# Patient Record
Sex: FEMALE | Race: WHITE | NOT HISPANIC OR LATINO | Employment: OTHER | URBAN - METROPOLITAN AREA
[De-identification: names, ages, dates, MRNs, and addresses within clinical notes are randomized per-mention and may not be internally consistent; named-entity substitution may affect disease eponyms.]

---

## 2017-11-29 ENCOUNTER — TRANSFERRED RECORDS (OUTPATIENT)
Dept: HEALTH INFORMATION MANAGEMENT | Facility: CLINIC | Age: 77
End: 2017-11-29

## 2017-12-21 ENCOUNTER — TRANSFERRED RECORDS (OUTPATIENT)
Dept: HEALTH INFORMATION MANAGEMENT | Facility: CLINIC | Age: 77
End: 2017-12-21

## 2018-01-02 RX ORDER — LOSARTAN POTASSIUM 100 MG/1
100 TABLET ORAL EVERY MORNING
COMMUNITY
End: 2024-07-22

## 2018-01-02 RX ORDER — AMLODIPINE BESYLATE 5 MG/1
5 TABLET ORAL AT BEDTIME
COMMUNITY
End: 2024-07-22

## 2018-01-02 RX ORDER — LEVOTHYROXINE SODIUM 100 UG/1
100 TABLET ORAL
COMMUNITY

## 2018-01-03 ENCOUNTER — HOSPITAL ENCOUNTER (INPATIENT)
Facility: CLINIC | Age: 78
LOS: 1 days | Discharge: HOME | DRG: 742 | End: 2018-01-04
Attending: OBSTETRICS & GYNECOLOGY | Admitting: COLON & RECTAL SURGERY
Payer: MEDICARE

## 2018-01-03 ENCOUNTER — ANESTHESIA EVENT (OUTPATIENT)
Dept: SURGERY | Facility: CLINIC | Age: 78
DRG: 742 | End: 2018-01-03
Payer: MEDICARE

## 2018-01-03 ENCOUNTER — ANESTHESIA (OUTPATIENT)
Dept: SURGERY | Facility: CLINIC | Age: 78
DRG: 742 | End: 2018-01-03
Payer: MEDICARE

## 2018-01-03 DIAGNOSIS — N81.9 FEMALE GENITAL PROLAPSE, UNSPECIFIED TYPE: Primary | ICD-10-CM

## 2018-01-03 PROBLEM — N81.10 PELVIC ORGAN PROLAPSE QUANTIFICATION STAGE 4 CYSTOCELE: Status: ACTIVE | Noted: 2018-01-03

## 2018-01-03 LAB
ANION GAP SERPL CALCULATED.3IONS-SCNC: 6 MMOL/L (ref 3–14)
BUN SERPL-MCNC: 8 MG/DL (ref 7–30)
CALCIUM SERPL-MCNC: 9.1 MG/DL (ref 8.5–10.1)
CHLORIDE SERPL-SCNC: 104 MMOL/L (ref 94–109)
CO2 SERPL-SCNC: 26 MMOL/L (ref 20–32)
CREAT SERPL-MCNC: 0.63 MG/DL (ref 0.52–1.04)
ERYTHROCYTE [DISTWIDTH] IN BLOOD BY AUTOMATED COUNT: 12 % (ref 10–15)
GFR SERPL CREATININE-BSD FRML MDRD: >90 ML/MIN/1.7M2
GLUCOSE SERPL-MCNC: 113 MG/DL (ref 70–99)
HCT VFR BLD AUTO: 40 % (ref 35–47)
HGB BLD-MCNC: 13.6 G/DL (ref 11.7–15.7)
MCH RBC QN AUTO: 29.6 PG (ref 26.5–33)
MCHC RBC AUTO-ENTMCNC: 34 G/DL (ref 31.5–36.5)
MCV RBC AUTO: 87 FL (ref 78–100)
PLATELET # BLD AUTO: 201 10E9/L (ref 150–450)
POTASSIUM SERPL-SCNC: 3.6 MMOL/L (ref 3.4–5.3)
RBC # BLD AUTO: 4.59 10E12/L (ref 3.8–5.2)
SODIUM SERPL-SCNC: 136 MMOL/L (ref 133–144)
WBC # BLD AUTO: 6.9 10E9/L (ref 4–11)

## 2018-01-03 PROCEDURE — 25000125 ZZHC RX 250: Performed by: ANESTHESIOLOGY

## 2018-01-03 PROCEDURE — 40000169 ZZH STATISTIC PRE-PROCEDURE ASSESSMENT I: Performed by: OBSTETRICS & GYNECOLOGY

## 2018-01-03 PROCEDURE — 0TVD8ZZ RESTRICTION OF URETHRA, VIA NATURAL OR ARTIFICIAL OPENING ENDOSCOPIC: ICD-10-PCS | Performed by: OBSTETRICS & GYNECOLOGY

## 2018-01-03 PROCEDURE — 25000128 H RX IP 250 OP 636: Performed by: ANESTHESIOLOGY

## 2018-01-03 PROCEDURE — 8E0W4CZ ROBOTIC ASSISTED PROCEDURE OF TRUNK REGION, PERCUTANEOUS ENDOSCOPIC APPROACH: ICD-10-PCS | Performed by: COLON & RECTAL SURGERY

## 2018-01-03 PROCEDURE — 71000012 ZZH RECOVERY PHASE 1 LEVEL 1 FIRST HR: Performed by: OBSTETRICS & GYNECOLOGY

## 2018-01-03 PROCEDURE — 25000128 H RX IP 250 OP 636: Performed by: PHYSICIAN ASSISTANT

## 2018-01-03 PROCEDURE — 0UT9FZZ RESECTION OF UTERUS, VIA NATURAL OR ARTIFICIAL OPENING WITH PERCUTANEOUS ENDOSCOPIC ASSISTANCE: ICD-10-PCS | Performed by: OBSTETRICS & GYNECOLOGY

## 2018-01-03 PROCEDURE — 85027 COMPLETE CBC AUTOMATED: CPT | Performed by: COLON & RECTAL SURGERY

## 2018-01-03 PROCEDURE — 0TSD4ZZ REPOSITION URETHRA, PERCUTANEOUS ENDOSCOPIC APPROACH: ICD-10-PCS | Performed by: OBSTETRICS & GYNECOLOGY

## 2018-01-03 PROCEDURE — 36000087 ZZH SURGERY LEVEL 8 EA 15 ADDTL MIN: Performed by: OBSTETRICS & GYNECOLOGY

## 2018-01-03 PROCEDURE — 37000009 ZZH ANESTHESIA TECHNICAL FEE, EACH ADDTL 15 MIN: Performed by: OBSTETRICS & GYNECOLOGY

## 2018-01-03 PROCEDURE — 25800025 ZZH RX 258: Performed by: OBSTETRICS & GYNECOLOGY

## 2018-01-03 PROCEDURE — 0TUB8JZ SUPPLEMENT BLADDER WITH SYNTHETIC SUBSTITUTE, VIA NATURAL OR ARTIFICIAL OPENING ENDOSCOPIC: ICD-10-PCS | Performed by: OBSTETRICS & GYNECOLOGY

## 2018-01-03 PROCEDURE — 25000125 ZZHC RX 250: Performed by: NURSE ANESTHETIST, CERTIFIED REGISTERED

## 2018-01-03 PROCEDURE — 85018 HEMOGLOBIN: CPT | Performed by: OBSTETRICS & GYNECOLOGY

## 2018-01-03 PROCEDURE — 80048 BASIC METABOLIC PNL TOTAL CA: CPT | Performed by: COLON & RECTAL SURGERY

## 2018-01-03 PROCEDURE — 27210794 ZZH OR GENERAL SUPPLY STERILE: Performed by: OBSTETRICS & GYNECOLOGY

## 2018-01-03 PROCEDURE — 0DNU4ZZ RELEASE OMENTUM, PERCUTANEOUS ENDOSCOPIC APPROACH: ICD-10-PCS | Performed by: COLON & RECTAL SURGERY

## 2018-01-03 PROCEDURE — 25000132 ZZH RX MED GY IP 250 OP 250 PS 637: Mod: GY | Performed by: COLON & RECTAL SURGERY

## 2018-01-03 PROCEDURE — 71000013 ZZH RECOVERY PHASE 1 LEVEL 1 EA ADDTL HR: Performed by: OBSTETRICS & GYNECOLOGY

## 2018-01-03 PROCEDURE — 0USG4ZZ REPOSITION VAGINA, PERCUTANEOUS ENDOSCOPIC APPROACH: ICD-10-PCS | Performed by: OBSTETRICS & GYNECOLOGY

## 2018-01-03 PROCEDURE — 25000128 H RX IP 250 OP 636: Performed by: NURSE ANESTHETIST, CERTIFIED REGISTERED

## 2018-01-03 PROCEDURE — C1771 REP DEV, URINARY, W/SLING: HCPCS | Performed by: OBSTETRICS & GYNECOLOGY

## 2018-01-03 PROCEDURE — 25000128 H RX IP 250 OP 636: Performed by: COLON & RECTAL SURGERY

## 2018-01-03 PROCEDURE — 36000085 ZZH SURGERY LEVEL 8 1ST 30 MIN: Performed by: OBSTETRICS & GYNECOLOGY

## 2018-01-03 PROCEDURE — 25000128 H RX IP 250 OP 636

## 2018-01-03 PROCEDURE — 25000125 ZZHC RX 250

## 2018-01-03 PROCEDURE — 25000566 ZZH SEVOFLURANE, EA 15 MIN: Performed by: OBSTETRICS & GYNECOLOGY

## 2018-01-03 PROCEDURE — 88307 TISSUE EXAM BY PATHOLOGIST: CPT | Performed by: OBSTETRICS & GYNECOLOGY

## 2018-01-03 PROCEDURE — 25800025 ZZH RX 258: Performed by: PHYSICIAN ASSISTANT

## 2018-01-03 PROCEDURE — 27210995 ZZH RX 272: Performed by: PHYSICIAN ASSISTANT

## 2018-01-03 PROCEDURE — C1781 MESH (IMPLANTABLE): HCPCS | Performed by: OBSTETRICS & GYNECOLOGY

## 2018-01-03 PROCEDURE — 37000008 ZZH ANESTHESIA TECHNICAL FEE, 1ST 30 MIN: Performed by: OBSTETRICS & GYNECOLOGY

## 2018-01-03 PROCEDURE — 36415 COLL VENOUS BLD VENIPUNCTURE: CPT | Performed by: COLON & RECTAL SURGERY

## 2018-01-03 PROCEDURE — 0DNW4ZZ RELEASE PERITONEUM, PERCUTANEOUS ENDOSCOPIC APPROACH: ICD-10-PCS | Performed by: COLON & RECTAL SURGERY

## 2018-01-03 PROCEDURE — 88307 TISSUE EXAM BY PATHOLOGIST: CPT | Mod: 26 | Performed by: OBSTETRICS & GYNECOLOGY

## 2018-01-03 PROCEDURE — 0UT2FZZ RESECTION OF BILATERAL OVARIES, VIA NATURAL OR ARTIFICIAL OPENING WITH PERCUTANEOUS ENDOSCOPIC ASSISTANCE: ICD-10-PCS | Performed by: OBSTETRICS & GYNECOLOGY

## 2018-01-03 PROCEDURE — 0UT7FZZ RESECTION OF BILATERAL FALLOPIAN TUBES, VIA NATURAL OR ARTIFICIAL OPENING WITH PERCUTANEOUS ENDOSCOPIC ASSISTANCE: ICD-10-PCS | Performed by: OBSTETRICS & GYNECOLOGY

## 2018-01-03 PROCEDURE — 0UUG4JZ SUPPLEMENT VAGINA WITH SYNTHETIC SUBSTITUTE, PERCUTANEOUS ENDOSCOPIC APPROACH: ICD-10-PCS | Performed by: OBSTETRICS & GYNECOLOGY

## 2018-01-03 PROCEDURE — 25000125 ZZHC RX 250: Performed by: OBSTETRICS & GYNECOLOGY

## 2018-01-03 PROCEDURE — 0JNC0ZZ RELEASE PELVIC REGION SUBCUTANEOUS TISSUE AND FASCIA, OPEN APPROACH: ICD-10-PCS | Performed by: COLON & RECTAL SURGERY

## 2018-01-03 PROCEDURE — A9270 NON-COVERED ITEM OR SERVICE: HCPCS | Mod: GY | Performed by: COLON & RECTAL SURGERY

## 2018-01-03 PROCEDURE — 0JUC3JZ SUPPLEMENT OF PELVIC REGION SUBCUTANEOUS TISSUE AND FASCIA WITH SYNTHETIC SUBSTITUTE, PERCUTANEOUS APPROACH: ICD-10-PCS | Performed by: COLON & RECTAL SURGERY

## 2018-01-03 PROCEDURE — 27210995 ZZH RX 272: Performed by: OBSTETRICS & GYNECOLOGY

## 2018-01-03 PROCEDURE — 0DSP4ZZ REPOSITION RECTUM, PERCUTANEOUS ENDOSCOPIC APPROACH: ICD-10-PCS | Performed by: COLON & RECTAL SURGERY

## 2018-01-03 DEVICE — SLING URINARY TVT EXACT  TVTRL: Type: IMPLANTABLE DEVICE | Site: VAGINA | Status: FUNCTIONAL

## 2018-01-03 DEVICE — MESH SLING FLAT RESTORELLE 24X8CM 501440: Type: IMPLANTABLE DEVICE | Site: PELVIS | Status: FUNCTIONAL

## 2018-01-03 DEVICE — MESH SLING ARTISYN SACROCOLPOPEXY ARTY5L: Type: IMPLANTABLE DEVICE | Site: PELVIS | Status: FUNCTIONAL

## 2018-01-03 RX ORDER — HYDROMORPHONE HYDROCHLORIDE 1 MG/ML
.3-.5 INJECTION, SOLUTION INTRAMUSCULAR; INTRAVENOUS; SUBCUTANEOUS
Status: DISCONTINUED | OUTPATIENT
Start: 2018-01-03 | End: 2018-01-04 | Stop reason: HOSPADM

## 2018-01-03 RX ORDER — ONDANSETRON 4 MG/1
4 TABLET, ORALLY DISINTEGRATING ORAL EVERY 6 HOURS PRN
Status: DISCONTINUED | OUTPATIENT
Start: 2018-01-03 | End: 2018-01-04 | Stop reason: HOSPADM

## 2018-01-03 RX ORDER — KETOROLAC TROMETHAMINE 15 MG/ML
15 INJECTION, SOLUTION INTRAMUSCULAR; INTRAVENOUS EVERY 6 HOURS PRN
Status: DISCONTINUED | OUTPATIENT
Start: 2018-01-03 | End: 2018-01-04 | Stop reason: HOSPADM

## 2018-01-03 RX ORDER — FENTANYL CITRATE 50 UG/ML
INJECTION, SOLUTION INTRAMUSCULAR; INTRAVENOUS PRN
Status: DISCONTINUED | OUTPATIENT
Start: 2018-01-03 | End: 2018-01-03

## 2018-01-03 RX ORDER — FENTANYL CITRATE 50 UG/ML
25-50 INJECTION, SOLUTION INTRAMUSCULAR; INTRAVENOUS
Status: DISCONTINUED | OUTPATIENT
Start: 2018-01-03 | End: 2018-01-03 | Stop reason: HOSPADM

## 2018-01-03 RX ORDER — PROCHLORPERAZINE MALEATE 5 MG
5 TABLET ORAL EVERY 6 HOURS PRN
Status: DISCONTINUED | OUTPATIENT
Start: 2018-01-03 | End: 2018-01-04 | Stop reason: HOSPADM

## 2018-01-03 RX ORDER — SODIUM CHLORIDE, SODIUM LACTATE, POTASSIUM CHLORIDE, CALCIUM CHLORIDE 600; 310; 30; 20 MG/100ML; MG/100ML; MG/100ML; MG/100ML
INJECTION, SOLUTION INTRAVENOUS CONTINUOUS
Status: DISCONTINUED | OUTPATIENT
Start: 2018-01-03 | End: 2018-01-03 | Stop reason: HOSPADM

## 2018-01-03 RX ORDER — ONDANSETRON 2 MG/ML
INJECTION INTRAMUSCULAR; INTRAVENOUS PRN
Status: DISCONTINUED | OUTPATIENT
Start: 2018-01-03 | End: 2018-01-03

## 2018-01-03 RX ORDER — OXYCODONE AND ACETAMINOPHEN 5; 325 MG/1; MG/1
1-2 TABLET ORAL EVERY 4 HOURS PRN
Qty: 30 TABLET | Refills: 0 | Status: SHIPPED | OUTPATIENT
Start: 2018-01-03 | End: 2018-03-02

## 2018-01-03 RX ORDER — BUPIVACAINE HYDROCHLORIDE AND EPINEPHRINE 5; 5 MG/ML; UG/ML
INJECTION, SOLUTION PERINEURAL PRN
Status: DISCONTINUED | OUTPATIENT
Start: 2018-01-03 | End: 2018-01-03 | Stop reason: HOSPADM

## 2018-01-03 RX ORDER — ACETAMINOPHEN 10 MG/ML
1000 INJECTION, SOLUTION INTRAVENOUS EVERY 6 HOURS
Status: DISCONTINUED | OUTPATIENT
Start: 2018-01-03 | End: 2018-01-04 | Stop reason: HOSPADM

## 2018-01-03 RX ORDER — ACETAMINOPHEN 325 MG/1
975 TABLET ORAL ONCE
Status: COMPLETED | OUTPATIENT
Start: 2018-01-03 | End: 2018-01-03

## 2018-01-03 RX ORDER — ONDANSETRON 4 MG/1
4 TABLET, ORALLY DISINTEGRATING ORAL EVERY 30 MIN PRN
Status: DISCONTINUED | OUTPATIENT
Start: 2018-01-03 | End: 2018-01-03 | Stop reason: HOSPADM

## 2018-01-03 RX ORDER — CEFAZOLIN SODIUM 1 G
1 VIAL (EA) INJECTION EVERY 8 HOURS
Status: COMPLETED | OUTPATIENT
Start: 2018-01-03 | End: 2018-01-04

## 2018-01-03 RX ORDER — HYDROMORPHONE HYDROCHLORIDE 1 MG/ML
.3-.5 INJECTION, SOLUTION INTRAMUSCULAR; INTRAVENOUS; SUBCUTANEOUS EVERY 5 MIN PRN
Status: DISCONTINUED | OUTPATIENT
Start: 2018-01-03 | End: 2018-01-03 | Stop reason: HOSPADM

## 2018-01-03 RX ORDER — EPHEDRINE SULFATE 50 MG/ML
INJECTION, SOLUTION INTRAMUSCULAR; INTRAVENOUS; SUBCUTANEOUS PRN
Status: DISCONTINUED | OUTPATIENT
Start: 2018-01-03 | End: 2018-01-03

## 2018-01-03 RX ORDER — NALOXONE HYDROCHLORIDE 0.4 MG/ML
.1-.4 INJECTION, SOLUTION INTRAMUSCULAR; INTRAVENOUS; SUBCUTANEOUS
Status: DISCONTINUED | OUTPATIENT
Start: 2018-01-03 | End: 2018-01-04 | Stop reason: HOSPADM

## 2018-01-03 RX ORDER — PROPOFOL 10 MG/ML
INJECTION, EMULSION INTRAVENOUS CONTINUOUS PRN
Status: DISCONTINUED | OUTPATIENT
Start: 2018-01-03 | End: 2018-01-03

## 2018-01-03 RX ORDER — ONDANSETRON 2 MG/ML
4 INJECTION INTRAMUSCULAR; INTRAVENOUS EVERY 6 HOURS PRN
Status: DISCONTINUED | OUTPATIENT
Start: 2018-01-03 | End: 2018-01-04 | Stop reason: HOSPADM

## 2018-01-03 RX ORDER — ONDANSETRON 2 MG/ML
4 INJECTION INTRAMUSCULAR; INTRAVENOUS EVERY 30 MIN PRN
Status: DISCONTINUED | OUTPATIENT
Start: 2018-01-03 | End: 2018-01-03 | Stop reason: HOSPADM

## 2018-01-03 RX ORDER — GLYCOPYRROLATE 0.2 MG/ML
INJECTION, SOLUTION INTRAMUSCULAR; INTRAVENOUS PRN
Status: DISCONTINUED | OUTPATIENT
Start: 2018-01-03 | End: 2018-01-03

## 2018-01-03 RX ORDER — DEXAMETHASONE SODIUM PHOSPHATE 4 MG/ML
INJECTION, SOLUTION INTRA-ARTICULAR; INTRALESIONAL; INTRAMUSCULAR; INTRAVENOUS; SOFT TISSUE PRN
Status: DISCONTINUED | OUTPATIENT
Start: 2018-01-03 | End: 2018-01-03

## 2018-01-03 RX ORDER — LEVOTHYROXINE SODIUM 88 UG/1
88 TABLET ORAL EVERY MORNING
Status: DISCONTINUED | OUTPATIENT
Start: 2018-01-04 | End: 2018-01-04 | Stop reason: HOSPADM

## 2018-01-03 RX ORDER — LIDOCAINE HYDROCHLORIDE 20 MG/ML
INJECTION, SOLUTION INFILTRATION; PERINEURAL PRN
Status: DISCONTINUED | OUTPATIENT
Start: 2018-01-03 | End: 2018-01-03

## 2018-01-03 RX ORDER — PROPOFOL 10 MG/ML
INJECTION, EMULSION INTRAVENOUS PRN
Status: DISCONTINUED | OUTPATIENT
Start: 2018-01-03 | End: 2018-01-03

## 2018-01-03 RX ORDER — DEXTROSE MONOHYDRATE, SODIUM CHLORIDE, AND POTASSIUM CHLORIDE 50; 1.49; 4.5 G/1000ML; G/1000ML; G/1000ML
INJECTION, SOLUTION INTRAVENOUS CONTINUOUS
Status: DISCONTINUED | OUTPATIENT
Start: 2018-01-03 | End: 2018-01-04

## 2018-01-03 RX ORDER — MAGNESIUM HYDROXIDE 1200 MG/15ML
LIQUID ORAL PRN
Status: DISCONTINUED | OUTPATIENT
Start: 2018-01-03 | End: 2018-01-03 | Stop reason: HOSPADM

## 2018-01-03 RX ORDER — NEOSTIGMINE METHYLSULFATE 1 MG/ML
VIAL (ML) INJECTION PRN
Status: DISCONTINUED | OUTPATIENT
Start: 2018-01-03 | End: 2018-01-03

## 2018-01-03 RX ADMIN — ROCURONIUM BROMIDE 50 MG: 10 INJECTION INTRAVENOUS at 07:41

## 2018-01-03 RX ADMIN — TAZOBACTAM SODIUM AND PIPERACILLIN SODIUM 2.25 G: 375; 3 INJECTION, SOLUTION INTRAVENOUS at 10:15

## 2018-01-03 RX ADMIN — HYDROMORPHONE HYDROCHLORIDE 0.25 MG: 1 INJECTION, SOLUTION INTRAMUSCULAR; INTRAVENOUS; SUBCUTANEOUS at 11:30

## 2018-01-03 RX ADMIN — PROCHLORPERAZINE EDISYLATE 5 MG: 5 INJECTION INTRAMUSCULAR; INTRAVENOUS at 18:32

## 2018-01-03 RX ADMIN — LIDOCAINE HYDROCHLORIDE 80 MG: 20 INJECTION, SOLUTION INFILTRATION; PERINEURAL at 07:41

## 2018-01-03 RX ADMIN — FENTANYL CITRATE 50 MCG: 50 INJECTION, SOLUTION INTRAMUSCULAR; INTRAVENOUS at 08:41

## 2018-01-03 RX ADMIN — ONDANSETRON 4 MG: 2 INJECTION INTRAMUSCULAR; INTRAVENOUS at 18:01

## 2018-01-03 RX ADMIN — ONDANSETRON 4 MG: 2 INJECTION INTRAMUSCULAR; INTRAVENOUS at 11:42

## 2018-01-03 RX ADMIN — KETOROLAC TROMETHAMINE 15 MG: 15 INJECTION, SOLUTION INTRAMUSCULAR; INTRAVENOUS at 14:26

## 2018-01-03 RX ADMIN — DEXAMETHASONE SODIUM PHOSPHATE 4 MG: 4 INJECTION, SOLUTION INTRA-ARTICULAR; INTRALESIONAL; INTRAMUSCULAR; INTRAVENOUS; SOFT TISSUE at 07:41

## 2018-01-03 RX ADMIN — Medication 5 MG: at 07:54

## 2018-01-03 RX ADMIN — ACETAMINOPHEN 975 MG: 325 TABLET, FILM COATED ORAL at 07:02

## 2018-01-03 RX ADMIN — Medication 5 MG: at 08:13

## 2018-01-03 RX ADMIN — ONDANSETRON 4 MG: 2 INJECTION INTRAMUSCULAR; INTRAVENOUS at 07:41

## 2018-01-03 RX ADMIN — ACETAMINOPHEN 1000 MG: 10 INJECTION, SOLUTION INTRAVENOUS at 17:17

## 2018-01-03 RX ADMIN — SODIUM CHLORIDE, POTASSIUM CHLORIDE, SODIUM LACTATE AND CALCIUM CHLORIDE: 600; 310; 30; 20 INJECTION, SOLUTION INTRAVENOUS at 08:02

## 2018-01-03 RX ADMIN — ROCURONIUM BROMIDE 20 MG: 10 INJECTION INTRAVENOUS at 08:21

## 2018-01-03 RX ADMIN — TAZOBACTAM SODIUM AND PIPERACILLIN SODIUM 3.38 G: 375; 3 INJECTION, SOLUTION INTRAVENOUS at 07:49

## 2018-01-03 RX ADMIN — PROCHLORPERAZINE EDISYLATE 5 MG: 5 INJECTION INTRAMUSCULAR; INTRAVENOUS at 13:31

## 2018-01-03 RX ADMIN — PROPOFOL 150 MG: 10 INJECTION, EMULSION INTRAVENOUS at 07:41

## 2018-01-03 RX ADMIN — FENTANYL CITRATE 50 MCG: 50 INJECTION, SOLUTION INTRAMUSCULAR; INTRAVENOUS at 08:31

## 2018-01-03 RX ADMIN — ROCURONIUM BROMIDE 20 MG: 10 INJECTION INTRAVENOUS at 09:15

## 2018-01-03 RX ADMIN — SODIUM CHLORIDE, POTASSIUM CHLORIDE, SODIUM LACTATE AND CALCIUM CHLORIDE: 600; 310; 30; 20 INJECTION, SOLUTION INTRAVENOUS at 07:02

## 2018-01-03 RX ADMIN — ROCURONIUM BROMIDE 10 MG: 10 INJECTION INTRAVENOUS at 10:50

## 2018-01-03 RX ADMIN — FENTANYL CITRATE 50 MCG: 50 INJECTION, SOLUTION INTRAMUSCULAR; INTRAVENOUS at 10:34

## 2018-01-03 RX ADMIN — CEFAZOLIN SODIUM 1 G: 10 INJECTION, POWDER, FOR SOLUTION INTRAVENOUS at 18:37

## 2018-01-03 RX ADMIN — FENTANYL CITRATE 50 MCG: 50 INJECTION, SOLUTION INTRAMUSCULAR; INTRAVENOUS at 08:02

## 2018-01-03 RX ADMIN — PHENYLEPHRINE HYDROCHLORIDE 100 MCG: 10 INJECTION INTRAVENOUS at 07:48

## 2018-01-03 RX ADMIN — SODIUM CHLORIDE, POTASSIUM CHLORIDE, SODIUM LACTATE AND CALCIUM CHLORIDE: 600; 310; 30; 20 INJECTION, SOLUTION INTRAVENOUS at 10:52

## 2018-01-03 RX ADMIN — FENTANYL CITRATE 50 MCG: 50 INJECTION, SOLUTION INTRAMUSCULAR; INTRAVENOUS at 11:22

## 2018-01-03 RX ADMIN — LIDOCAINE HYDROCHLORIDE 0.2 ML: 10 INJECTION, SOLUTION EPIDURAL; INFILTRATION; INTRACAUDAL; PERINEURAL at 07:04

## 2018-01-03 RX ADMIN — GLYCOPYRROLATE 0.4 MG: 0.2 INJECTION, SOLUTION INTRAMUSCULAR; INTRAVENOUS at 11:34

## 2018-01-03 RX ADMIN — NEOSTIGMINE METHYLSULFATE 3 MG: 1 INJECTION INTRAMUSCULAR; INTRAVENOUS; SUBCUTANEOUS at 11:34

## 2018-01-03 RX ADMIN — ROCURONIUM BROMIDE 10 MG: 10 INJECTION INTRAVENOUS at 10:15

## 2018-01-03 RX ADMIN — PROPOFOL 30 MCG/KG/MIN: 10 INJECTION, EMULSION INTRAVENOUS at 07:41

## 2018-01-03 RX ADMIN — POTASSIUM CHLORIDE, DEXTROSE MONOHYDRATE AND SODIUM CHLORIDE: 150; 5; 450 INJECTION, SOLUTION INTRAVENOUS at 16:10

## 2018-01-03 RX ADMIN — HYDROMORPHONE HYDROCHLORIDE 0.25 MG: 1 INJECTION, SOLUTION INTRAMUSCULAR; INTRAVENOUS; SUBCUTANEOUS at 11:06

## 2018-01-03 RX ADMIN — ACETAMINOPHEN 1000 MG: 10 INJECTION, SOLUTION INTRAVENOUS at 22:28

## 2018-01-03 RX ADMIN — MIDAZOLAM 2 MG: 1 INJECTION INTRAMUSCULAR; INTRAVENOUS at 07:29

## 2018-01-03 ASSESSMENT — LIFESTYLE VARIABLES: TOBACCO_USE: 0

## 2018-01-03 NOTE — BRIEF OP NOTE
[facility] Brief Operative Note    Pre-operative diagnosis: SECOND DEGREE PROLAPSE AND INCONTIENCE, RECTOPEXY    Post-operative diagnosis Uterovaginal prolapse, cystocele, rectocele, stress incontinence, bowel adhesions     Procedure: Procedure(s):  DAVINCI XI SUPRACERVICAL HYSTERECTOMY, BILATERAL SALPINGO-OOPHORECTOMY, SACROCOLPOPEXY, TRANSVAGINAL SLING, CYSTOSCOPY (DR. ENGLISH);  DAVINCI XI VENTRAL RECTOPEXY, LAPAROSCOPIC EXTENSIVE LYSIS OF ADHESIONS (DR. NICOLE)  - Wound Class: II-Clean Contaminated   - Wound Class: II-Clean Contaminated   - Wound Class: I-Clean   - Wound Class: II-Clean Contaminated   - Wound Class: I-Clean   Surgeon(s): Surgeon(s) and Role:  Panel 1:     * Robles López MD - Primary     * Anusha Tracey PA-C - Assisting    Panel 2:     * Anusha Nicole MD - Primary     * Helen Nascimento PA-C - Assisting    Panel 3:     * Robles López MD - Primary     * Anusha Tracey PA-C - Assisting   Estimated blood loss: 20 mL    Specimens:   ID Type Source Tests Collected by Time Destination   A : UTERUS, BILATERAL FALLOPIAN TUBES AND OVARIES Tissue Uterus with Bilateral Ovaries and Fallopian Tubes SURGICAL PATHOLOGY EXAM Robles López MD 1/3/2018 11:30 AM       Findings: Hernan removed extensive adhesions from previous kidney and bowel work. I did routine hysterectomy with tubes and ovaries. Tissue was extremely lax, and I went out to speak with her  and got permission to add a sling if I saw fit after the correction with the sacral colpopexy. I sexed the A/P seam to the promontory and she still had a gaping urethra with cysturethrocele that was not appreciated in the office. We placed the sling and cysto was as expected in someone with a right nephrectomy.

## 2018-01-03 NOTE — BRIEF OP NOTE
[facility] Brief Operative Note    Pre-operative diagnosis: Pelvic organ prolapse with rectocele, rectal intussusception, enterocele and uterovaginal prolapse   Post-operative diagnosis Pelvic organ prolapse with rectocele, rectal intussusception, enterocele and uterovaginal prolapse, cystocele, stress incontinence, bowel adhesions     Procedure: 1. Robotic extensive lysis of adhesions (Dr. Becerra)  2. Supracervical hysterectomy and bilateral salpingo-oophorectomy (Dr. López)  3. Robotic ventral rectopexy (Dr. Becerra)  4. Robotic sacrocolpopexy (Dr. López)  5. Rectocele repair (Dr. Becerra)  6. Transvaginal sling and cystoscopy (Dr. López)   Surgeon(s): Surgeon(s) and Role:  Panel 1:     * Robles López MD - Primary     * Anusha Tracey PA-C - Assisting    Panel 2:     * Anusha Becerra MD - Primary     * Helen Nascimento PA-C - Assisting    Panel 3:     * Robles López MD - Primary     * Anusha Tracey PA-C - Assisting   Estimated blood loss: 20 mL    Specimens:   ID Type Source Tests Collected by Time Destination   A : UTERUS, BILATERAL FALLOPIAN TUBES AND OVARIES Tissue Uterus with Bilateral Ovaries and Fallopian Tubes SURGICAL PATHOLOGY EXAM Robles López MD 1/3/2018 11:30 AM       Findings: Pam adhesions from prior nephrectomy. permanent mesh used. Rectum measures 5 cm at levator muscles.         IVF: 2000 cc  UOP: 250 cc  Condition on discharge from OR: Satisfactory    Helen Nascimento PA-C   [facility]   [phone].        ADDENDUM:    PATIENT DATA  Indicate Y or N:  Home O2 No  Hemodialysis  No  Transplant patient  No  Cirrhosis  No  Steroids in last 30 days  No  Immunomodulators in last 30 days  No  Anticoagulation at time of surgery  No   List medication NA  Prior abdominal surgery  Yes  Pelvic irradiation  No    Albumin within 30 days if known    Hgb within 30 days if known    Hemoglobin   Date Value Ref Range Status    01/03/2018 13.6 11.7 - 15.7 g/dL Final   ]  Cr within 30 days if known   Creatinine   Date Value Ref Range Status   01/03/2018 0.63 0.52 - 1.04 mg/dL Final   ]  Body mass index is 25.45 kg/(m^2).      OR DATA  Emergent  No   <24 hours  No   <1 week  No  Bowel Prep Yes  Antibiotics  Yes  DVT prophylaxis    Heparin  No   SCD  Yes   None  No  Drain  No  ASA (1,2,3,4) 3  OR time (min) 235  Stents  No  Transfuse >/= 2U  No  Anastomosis NA  Leak Test NA

## 2018-01-03 NOTE — ANESTHESIA CARE TRANSFER NOTE
Patient: Yasmin Larry    Procedure(s):  DAVINCI XI SUPRACERVICAL HYSTERECTOMY, BILATERAL SALPINGO-OOPHORECTOMY, SACROCOLPOPEXY, TRANSVAGINAL SLING, CYSTOSCOPY (DR. ENGLISH);  DAVINCI XI VENTRAL RECTOPEXY, LAPAROSCOPIC EXTENSIVE LYSIS OF ADHESIONS (DR. NICOLE)  - Wound Class: II-Clean Contaminated   - Wound Class: II-Clean Contaminated   - Wound Class: I-Clean   - Wound Class: II-Clean Contaminated   - Wound Class: I-Clean    Diagnosis: SECOND DEGREE PROLAPSE AND INCONTIENCE, RECTOPEXY   Diagnosis Additional Information: No value filed.    Anesthesia Type:   General, ETT     Note:  Airway :Face Mask and Oral Airway  Patient transferred to:PACU  Handoff Report: Identifed the Patient, Identified the Reponsible Provider, Reviewed the pertinent medical history, Discussed the surgical course, Reviewed Intra-OP anesthesia mangement and issues during anesthesia, Set expectations for post-procedure period and Allowed opportunity for questions and acknowledgement of understanding      Vitals: (Last set prior to Anesthesia Care Transfer)    CRNA VITALS  1/3/2018 1130 - 1/3/2018 1213      1/3/2018             Pulse: 74    SpO2: 100 %    Resp Rate (observed): 16                Electronically Signed By: CINDY Martínez CRNA  January 3, 2018  12:13 PM

## 2018-01-03 NOTE — IP AVS SNAPSHOT
[place] [address] [facility]    [address] [place] [address]    Phone:  [phone]                                       After Visit Summary   1/3/2018    Yasmin Larry    MRN: [id number]           After Visit Summary Signature Page     I have received my discharge instructions, and my questions have been answered. I have discussed any challenges I see with this plan with the nurse or doctor.    ..........................................................................................................................................  Patient/Patient Representative Signature      ..........................................................................................................................................  Patient Representative Print Name and Relationship to Patient    ..................................................               ................................................  Date                                            Time    ..........................................................................................................................................  Reviewed by Signature/Title    ...................................................              ..............................................  Date                                                            Time

## 2018-01-03 NOTE — PROGRESS NOTES
Admission medication history interview status for the 1/3/2018  admission is complete. See EPIC admission navigator for prior to admission medications     Medication history source reliability:Good    Medication history interview source(s):Patient    Medication history resources (including written lists, pill bottles, clinic record):Patient mailed in her list prior to surgery    Primary pharmacy.[facility]    Additional medication history information not noted on PTA med list :None    Time spent in this activity: 40 minutes    Prior to Admission medications    Medication Sig Last Dose Taking? Auth Provider   Calcium Citrate-Vitamin D (CALCIUM CITRATE + D PO) Take 1 tablet by mouth 2 times daily 12/26/2017 at PM Yes Reported, Patient   Omega-3 Fatty Acids (FISH OIL PO) Take 1 g by mouth daily 12/26/2017 at AM Yes Reported, Patient   Cyanocobalamin (B-12 DOTS PO) Take 1 capsule by mouth daily 12/26/2017 at AM Yes Reported, Patient   Ascorbic Acid (VITAMIN C PO) Take 1 tablet by mouth daily 12/26/2017 at AM Yes Reported, Patient   Acetaminophen (TYLENOL PO) Take 500-1,000 mg by mouth 3 times daily as needed for mild pain or fever 1/2/2018 at HS Yes Reported, Patient   Misc Natural Products (NATURAL EMU RELIEF EX) Externally apply topically nightly as needed (Pain to Leg) 1/1/2018 at HS Yes Reported, Patient   ASPIRIN PO Take 81 mg by mouth At Bedtime 12/20/2017 at HS Yes Reported, Patient   LEVOTHYROXINE SODIUM PO Take 88 mcg by mouth every morning  1/3/2018 at 0300 Yes Reported, Patient   NADOLOL PO Take 10 mg by mouth every morning (0.5 x 20 mg tablet = 10 mg dose) 1/2/2018 at AM Yes Reported, Patient   ATORVASTATIN CALCIUM PO Take 20 mg by mouth At Bedtime 1/2/2018 at HS Yes Reported, Patient   AMLODIPINE BESYLATE PO Take 5 mg by mouth At Bedtime 1/2/2018 at HS Yes Reported, Patient   LOSARTAN POTASSIUM PO Take 50 mg by mouth every morning 1/3/2018 at 0300 Yes Reported, Patient   OMEPRAZOLE PO Take 20 mg by mouth every  other day  1/2/2018 at AM Yes Reported, Patient   traZODone (DESYREL) 50 MG tablet Take 50 mg by mouth At Bedtime. 1/2/2018 at HS Yes Unknown, Entered By History

## 2018-01-03 NOTE — IP AVS SNAPSHOT
MRN:[id number]                      After Visit Summary   1/3/2018    Yasmin Larry    MRN: [id number]           Thank you!     Thank you for choosing [facility] for your care. Our goal is always to provide you with excellent care. Hearing back from our patients is one way we can continue to improve our services. Please take a few minutes to complete the written survey that you may receive in the mail after you visit with us. Thank you!        Patient Information     Date Of Birth          1940        Designated Caregiver       Most Recent Value    Caregiver    Will someone help with your care after discharge? yes    Name of designated caregiver Bo    Phone number of caregiver [phone]    Caregiver address [address] [address]      About your hospital stay     You were admitted on:  January 3, 2018 You last received care in the:  [place] [address] [facility]    You were discharged on:  January 4, 2018       Who to Call     For medical emergencies, please call 911.  For non-urgent questions about your medical care, please call your primary care provider or clinic, [phone]  For questions related to your surgery, please call your surgery clinic        Attending Provider     Provider Specialty    Robles López MD OB/Gyn    Anusha Becerra MD Colon and Rectal Surgery       Primary Care Provider Office Phone # Fax #    Nae Looney [phone]      After Care Instructions     Discharge Instructions       Post-operative Instructions for Robotic Ventral Rectopexy    Wound Care ~ Your incisions will be covered with a clear paste called Dermabond.  This will typically flake off of the skin in 7-10 days after surgery.  You can shower and allow soap and water to run over all of your incisions.  It is common for the incision on the right side of the abdomen to hurt more than the other incisions (this incision has a deeper stitch placed in the  "muscle).  This will improve with time and you can place a warm or cool pack over the incisions for comfort.   If you have rectal soreness or discomfort then you can sit in a warm bath in your own bathtub starting 3 days after surgery.  Try to avoid soaking your abdominal incisions underwater.   Do not use public baths, pools, lakes or hot tubs for 6 weeks after surgery.      Diet ~ Stay on full liquid diet until passing gas then no dietary restrictions. Drink plenty of water.  It is normal for your intestines to take several days to fully \"wake up\" and begin working normally after surgery.  Passing flatus and loose stools is a good sign that the intestines are starting to work again.  Eating soft foods or easily digested foods is recommended until you feel that your intestines are working well.      Bowel Habits ~ It may take several days for the stools to become mostly solid, and several weeks for bowel habits to follow a \"normal\" and reliable pattern.  Immediately after surgery, your stools will be soft/liquid.  If you normally take stool softeners daily, then do not resume this until your stools start to become more formed.    It is normal to \"push\" gently when passing a bowel movement and this is okay after surgery.  You need to avoid sitting on the toilet and straining to have a bowel movement.     Activity/Lifting/Exercise ~ Light activity is encouraged.  You should do plenty of walking. You may return to light duty at work in 4 weeks or when you feel able.  No heavy lifting or strenuous exercise for 8-10 weeks.  No sexual intercourse for 8 weeks or at the instruction of your uro-gynecologist.    Pain Management ~ For mild pain you may take Ibuprofen or Tylenol.  You can also place a warm or cool pack over your incisions for comfort.  If you have rectal soreness then refer to the instructions under wound care.   You may be given a prescription for narcotic pain medication for moderate pain.  Narcotics can " cause or worsen constipation, so avoid taking narcotics if at all possible.  If you do take narcotic pain pills, then eat foods that have fiber and drink plenty of water to counteract the effects of narcotics.     When to Call the Doctor ~ Worsening or persistent pain, fever > 101 degrees F, chills, rashes, nausea/vomiting, persistent constipation, concerns with your incisions - increased redness, bleeding, swelling, or separation of incision, discharge or foul smell.     Follow up Care ~  You will have a post op appointment with Dr. Becerra or JOHN Alicea 6 weeks after surgery.  Call Dr. Becerra's office if you need to reschedule this appointment or if you have any questions prior to this appointment.     Contact Information:  Dr. Anusha Becerra  [phone]  JOHN Alicea  [phone]    [facility]  [address] [place], [address]  [address]                  Further instructions from your care team       Discharge Instructions following Gynecological Laparoscopy  [facility] [address]    Diet:   -Diet as tolerated.  [place] diet or light diet is advisable the day of surgery.  Drink plenty of fluids.    Activity:   -May resume normal activity as soon as abdominal pain disappears.  Listen to your body-if it hurts, don't do it.  Avoid douches, tampons, & intercourse for 1-2 weeks.    Bathing/Incision Care:   -Baths or showers are acceptable.  Bandaids may be removed at anytime.  There is usually as suture (stitch) in the incision under the skin that will dissolve on own & will not need to be removed.    What to expect:   -May have vaginal bleeding or discharge for 1-2 weeks.  Possible abdominal & shoulder discomfort due to gas remaining in the abdomen.  This should resolve in 24-48 hours; short, frequent walks may help relieve this.    Notify your surgeon for the following signs & symptoms:   -Temperature over 100.4 degrees   -Vaginal bleeding in excess of  "one pad per hour   -Uncontrolled pain   -Incisions becoming more swollen, warm, reddened, or painful       Pending Results     Date and Time Order Name Status Description    1/3/2018 1130 Surgical pathology exam In process             Statement of Approval     Ordered          18 0823  I have reviewed and agree with all the recommendations and orders detailed in this document.  EFFECTIVE NOW     Approved and electronically signed by:  Helen Nascimento PA-C             Admission Information     Date & Time Provider Department Dept. Phone    1/3/2018 Anusha Becerra MD [place] [address] [facility] [phone]      Your Vitals Were     Blood Pressure Pulse Temperature Respirations Height Weight    154/63 (BP Location: Right arm) 67 97.3  F (36.3  C) (Oral) 18 1.499 m (4' 11\") 57.2 kg (126 lb)    Pulse Oximetry BMI (Body Mass Index)                96% 25.45 kg/m2          [employer] Information     [employer] lets you send messages to your doctor, view your test results, renew your prescriptions, schedule appointments and more. To sign up, go to [email or web address] . Click on \"Log in\" on the left side of the screen, which will take you to the Welcome page. Then click on \"Sign up Now\" on the right side of the page.     You will be asked to enter the access code listed below, as well as some personal information. Please follow the directions to create your username and password.     Your access code is: [email or web address]  Expires: 2018 10:27 AM     Your access code will  in 90 days. If you need help or a new code, please call your [facility] clinic or [phone].        Care EveryWhere ID     This is your Care EveryWhere ID. This could be used by other organizations to access your [facility] medical records  [id number]        Equal Access to Services     LEXUS LONGORIA : Alisha Campbell, waadrienneda froilan, qaybta kaallionel menchaca, luis valdez. " So [facility] [phone].    ATENCIÓN: Si candace lyons, tiene a garcia disposición servicios gratuitos de asistencia lingüística. Gabi al [phone].    We comply with applicable federal civil rights laws and [place] laws. We do not discriminate on the basis of race, color, national origin, age, disability, sex, sexual orientation, or gender identity.               Review of your medicines      UNREVIEWED medicines. Ask your doctor about these medicines        Dose / Directions    AMLODIPINE BESYLATE PO        Dose:  5 mg   Take 5 mg by mouth At Bedtime   Refills:  0       B-12 DOTS PO        Dose:  1 capsule   Take 1 capsule by mouth daily   Refills:  0       CALCIUM CITRATE + D PO        Dose:  1 tablet   Take 1 tablet by mouth 2 times daily   Refills:  0       NADOLOL PO        Dose:  10 mg   Take 10 mg by mouth every morning (0.5 x 20 mg tablet = 10 mg dose)   Refills:  0       NATURAL EMU RELIEF EX        Externally apply topically nightly as needed (Pain to Leg)   Refills:  0         START taking        Dose / Directions    oxyCODONE-acetaminophen 5-325 MG per tablet   Commonly known as:  PERCOCET   Used for:  Female genital prolapse, unspecified type        Dose:  1-2 tablet   Take 1-2 tablets by mouth every 4 hours as needed for pain (moderate to severe)   Quantity:  30 tablet   Refills:  0         CONTINUE these medicines which have NOT CHANGED        Dose / Directions    ASPIRIN PO        Dose:  81 mg   Take 81 mg by mouth At Bedtime   Refills:  0       ATORVASTATIN CALCIUM PO        Dose:  20 mg   Take 20 mg by mouth At Bedtime   Refills:  0       FISH OIL PO        Dose:  1 g   Take 1 g by mouth daily   Refills:  0       LEVOTHYROXINE SODIUM PO        Dose:  88 mcg   Take 88 mcg by mouth every morning   Refills:  0       LOSARTAN POTASSIUM PO        Dose:  50 mg   Take 50 mg by mouth every morning   Refills:  0       OMEPRAZOLE PO        Dose:  20 mg   Take 20 mg by mouth every other day   Refills:  0        traZODone 50 MG tablet   Commonly known as:  DESYREL        Dose:  50 mg   Take 50 mg by mouth At Bedtime.   Refills:  0       TYLENOL PO        Dose:  500-1000 mg   Take 500-1,000 mg by mouth 3 times daily as needed for mild pain or fever   Refills:  0       VITAMIN C PO        Dose:  1 tablet   Take 1 tablet by mouth daily   Refills:  0            Where to get your medicines      Some of these will need a paper prescription and others can be bought over the counter. Ask your nurse if you have questions.     Bring a paper prescription for each of these medications     oxyCODONE-acetaminophen 5-325 MG per tablet                Protect others around you: Learn how to safely use, store and throw away your medicines at www.disposemymeds.org.             Medication List: This is a list of all your medications and when to take them. Check marks below indicate your daily home schedule. Keep this list as a reference.      Medications           Morning Afternoon Evening Bedtime As Needed    AMLODIPINE BESYLATE PO   Take 5 mg by mouth At Bedtime                                   ASPIRIN PO   Take 81 mg by mouth At Bedtime                                   ATORVASTATIN CALCIUM PO   Take 20 mg by mouth At Bedtime                                   B-12 DOTS PO   Take 1 capsule by mouth daily                                   CALCIUM CITRATE + D PO   Take 1 tablet by mouth 2 times daily                                      FISH OIL PO   Take 1 g by mouth daily                                   LEVOTHYROXINE SODIUM PO   Take 88 mcg by mouth every morning   Last time this was given:  88 mcg on 1/4/2018  8:46 AM                                   LOSARTAN POTASSIUM PO   Take 50 mg by mouth every morning                                   NADOLOL PO   Take 10 mg by mouth every morning (0.5 x 20 mg tablet = 10 mg dose)                                   NATURAL EMU RELIEF EX   Externally apply topically nightly as needed (Pain to Leg)                                    OMEPRAZOLE PO   Take 20 mg by mouth every other day            Every other day                       oxyCODONE-acetaminophen 5-325 MG per tablet   Commonly known as:  PERCOCET   Take 1-2 tablets by mouth every 4 hours as needed for pain (moderate to severe)                                   traZODone 50 MG tablet   Commonly known as:  DESYREL   Take 50 mg by mouth At Bedtime.                                   TYLENOL PO   Take 500-1,000 mg by mouth 3 times daily as needed for mild pain or fever   Last time this was given:  975 mg on 1/3/2018  7:02 AM                                   VITAMIN C PO   Take 1 tablet by mouth daily

## 2018-01-03 NOTE — ANESTHESIA PREPROCEDURE EVALUATION
Procedure: Procedure(s):  DAVINCI XI SALPINGO-OOPHORECTOMY INCLUDING BILATERAL  COMBINED DAVINCI XI HYSTERECTOMY SUPRACERVICAL, SACROCOLPOPEXY  DAVINCI XI RECTOPEXY  CYSTOSCOPY  Preop diagnosis: SECOND DEGREE PROLAPSE AND INCONTIENCE, RECTOPEXY     No Known Allergies  Past Medical History:   Diagnosis Date     Arthritis     back     Cerebral artery occlusion with cerebral infarction (H)      Hypertension      Hypothyroid      Other chronic pain     left lower leg      PONV (postoperative nausea and vomiting)      Sleep apnea      Past Surgical History:   Procedure Laterality Date     CHOLECYSTECTOMY       CHOLECYSTECTOMY       HERNIA REPAIR      ventral hernia     NEPHRECTOMY       Social History   Substance Use Topics     Smoking status: Never Smoker     Smokeless tobacco: Never Used     Alcohol use Yes      Comment: OCC     Prior to Admission medications    Medication Sig Start Date End Date Taking? Authorizing Provider   Calcium Citrate-Vitamin D (CALCIUM CITRATE + D PO) Take 1 tablet by mouth 2 times daily   Yes Reported, Patient   Omega-3 Fatty Acids (FISH OIL PO) Take 1 g by mouth daily   Yes Reported, Patient   Cyanocobalamin (B-12 DOTS PO) Take 1 capsule by mouth daily   Yes Reported, Patient   Ascorbic Acid (VITAMIN C PO) Take 1 tablet by mouth daily   Yes Reported, Patient   Acetaminophen (TYLENOL PO) Take 500-1,000 mg by mouth 3 times daily as needed for mild pain or fever   Yes Reported, Patient   Misc Natural Products (NATURAL EMU RELIEF EX) Externally apply topically nightly as needed (Pain to Leg)   Yes Reported, Patient   ASPIRIN PO Take 81 mg by mouth At Bedtime   Yes Reported, Patient   LEVOTHYROXINE SODIUM PO Take 88 mcg by mouth every morning    Yes Reported, Patient   NADOLOL PO Take 10 mg by mouth every morning (0.5 x 20 mg tablet = 10 mg dose)   Yes Reported, Patient   ATORVASTATIN CALCIUM PO Take 20 mg by mouth At Bedtime   Yes Reported, Patient   AMLODIPINE BESYLATE PO Take 5 mg by mouth At  Bedtime   Yes Reported, Patient   LOSARTAN POTASSIUM PO Take 50 mg by mouth every morning   Yes Reported, Patient   OMEPRAZOLE PO Take 20 mg by mouth every other day    Yes Reported, Patient   traZODone (DESYREL) 50 MG tablet Take 50 mg by mouth At Bedtime.   Yes Unknown, Entered By History     Current Facility-Administered Medications Ordered in Epic   Medication Dose Route Frequency Last Rate Last Dose     PRE OP antibiotics NOT needed for this surgical procedure  1 each As instructed Continuous         lidocaine 1 % 1 mL  1 mL Other Q1H PRN         sodium chloride (PF) 0.9% PF flush 3 mL  3 mL Intracatheter Q8H         lactated ringers infusion   Intravenous Continuous         acetaminophen (TYLENOL) tablet 975 mg  975 mg Oral Once         Provider ordered ALTERNATE pre op antibiotic.  1 each As instructed Continuous         piperacillin-tazobactam (ZOSYN) infusion 3.375 g  3.375 g Intravenous Pre-Op/Pre-procedure x 1 dose         No current [facility]-ordered outpatient prescriptions on file.       no pre procedure antibiotic needed       lactated ringers       Another Antibiotic has been ordered.       Wt Readings from Last 1 Encounters:   01/03/18 57.2 kg (126 lb)     Temp Readings from Last 1 Encounters:   01/03/18 36.1  C (97  F) (Temporal)     BP Readings from Last 6 Encounters:   01/03/18 107/63   08/22/14 165/70   06/23/14 139/73   06/07/14 147/66   01/09/14 100/60   06/27/13 120/62     Pulse Readings from Last 4 Encounters:   06/07/14 93   05/14/13 56   01/16/12 65     Resp Readings from Last 1 Encounters:   01/03/18 14     SpO2 Readings from Last 1 Encounters:   01/03/18 98%     Recent Labs   Lab Test  08/22/14   2220  06/23/14   1545   NA  139  137   POTASSIUM  3.7  3.5   CHLORIDE  104  100   CO2  28  26   ANIONGAP  7  11   GLC  119*  102*   BUN  15  15   CR  0.80  0.83   HUMA  9.3  9.1     No results for input(s): AST, ALT, ALKPHOS, BILITOTAL, LIPASE in the last 53672 hours.  Recent Labs   Lab Test   08/22/14   2220  06/23/14   1545   WBC  9.1  11.4*   HGB  13.5  13.8   PLT  189  142*     No results for input(s): ABO, RH in the last 51043 hours.  Recent Labs   Lab Test  05/14/13   1036   INR  0.96      Recent Labs   Lab Test  06/23/14   1545  05/14/13   1036   TROPI  <0.012  <0.012         Anesthesia Evaluation     . Pt has had prior anesthetic.     History of anesthetic complications   - PONV        ROS/MED HX    ENT/Pulmonary:     (+)sleep apnea, doesn't use CPAP , . .   (-) tobacco use and asthma   Neurologic:     (+)neuropathy TIA other neuro tremor controlled with nadalol     Cardiovascular:         METS/Exercise Tolerance:     Hematologic:         Musculoskeletal:         GI/Hepatic:         Renal/Genitourinary:     (+) chronic renal disease (s/p nephrectomy for kidney cancer),       Endo:     (+) thyroid problem hypothyroidism, .      Psychiatric:         Infectious Disease:         Malignancy:         Other:    (+) H/O Chronic Pain,                   Physical Exam      Airway   Mallampati: III  TM distance: <3 FB  Neck ROM: full    Dental   (+) caps  Comment: Temporary crown    Cardiovascular   Rhythm and rate: regular      Pulmonary    breath sounds clear to auscultation                    Anesthesia Plan      History & Physical Review  History and physical reviewed and following examination; no interval change.    ASA Status:  3 .    NPO Status:  > 8 hours    Plan for General and ETT   PONV prophylaxis:  Ondansetron (or other 5HT-3) and Dexamethasone or Solumedrol  Additional equipment: Videolaryngoscope Propofol gtt         Postoperative Care      Consents  Anesthetic plan, risks, benefits and alternatives discussed with:  Patient..                          .

## 2018-01-03 NOTE — PLAN OF CARE
Problem: Patient Care Overview  Goal: Plan of Care/Patient Progress Review  Outcome: No Change  Pt arrived to floor at 1500. VSS on 2L O2. Capno. Pinedo. 4x lap sites abd liquid bandage. 2x lap sites pelvic area liquid bandage. Ice chips. Nausea. Orders released and patient settled.

## 2018-01-04 VITALS
HEIGHT: 59 IN | HEART RATE: 67 BPM | OXYGEN SATURATION: 96 % | BODY MASS INDEX: 25.4 KG/M2 | SYSTOLIC BLOOD PRESSURE: 154 MMHG | RESPIRATION RATE: 18 BRPM | DIASTOLIC BLOOD PRESSURE: 63 MMHG | TEMPERATURE: 97.3 F | WEIGHT: 126 LBS

## 2018-01-04 PROBLEM — N81.9 PROLAPSE OF FEMALE PELVIC ORGANS: Status: ACTIVE | Noted: 2018-01-04

## 2018-01-04 LAB
ANION GAP SERPL CALCULATED.3IONS-SCNC: 4 MMOL/L (ref 3–14)
BUN SERPL-MCNC: 6 MG/DL (ref 7–30)
CALCIUM SERPL-MCNC: 8.3 MG/DL (ref 8.5–10.1)
CHLORIDE SERPL-SCNC: 104 MMOL/L (ref 94–109)
CO2 SERPL-SCNC: 28 MMOL/L (ref 20–32)
COPATH REPORT: NORMAL
CREAT SERPL-MCNC: 0.63 MG/DL (ref 0.52–1.04)
ERYTHROCYTE [DISTWIDTH] IN BLOOD BY AUTOMATED COUNT: 12 % (ref 10–15)
GFR SERPL CREATININE-BSD FRML MDRD: >90 ML/MIN/1.7M2
GLUCOSE BLDC GLUCOMTR-MCNC: 158 MG/DL (ref 70–99)
GLUCOSE SERPL-MCNC: 142 MG/DL (ref 70–99)
HCT VFR BLD AUTO: 37 % (ref 35–47)
HGB BLD-MCNC: 12.5 G/DL (ref 11.7–15.7)
MAGNESIUM SERPL-MCNC: 1.2 MG/DL (ref 1.6–2.3)
MCH RBC QN AUTO: 29.6 PG (ref 26.5–33)
MCHC RBC AUTO-ENTMCNC: 33.8 G/DL (ref 31.5–36.5)
MCV RBC AUTO: 88 FL (ref 78–100)
PHOSPHATE SERPL-MCNC: 2.5 MG/DL (ref 2.5–4.5)
PLATELET # BLD AUTO: 203 10E9/L (ref 150–450)
POTASSIUM SERPL-SCNC: 3.9 MMOL/L (ref 3.4–5.3)
RBC # BLD AUTO: 4.23 10E12/L (ref 3.8–5.2)
SODIUM SERPL-SCNC: 136 MMOL/L (ref 133–144)
WBC # BLD AUTO: 15.4 10E9/L (ref 4–11)

## 2018-01-04 PROCEDURE — A9270 NON-COVERED ITEM OR SERVICE: HCPCS | Mod: GY | Performed by: PHYSICIAN ASSISTANT

## 2018-01-04 PROCEDURE — 36415 COLL VENOUS BLD VENIPUNCTURE: CPT | Performed by: PHYSICIAN ASSISTANT

## 2018-01-04 PROCEDURE — 27210995 ZZH RX 272: Performed by: PHYSICIAN ASSISTANT

## 2018-01-04 PROCEDURE — 83735 ASSAY OF MAGNESIUM: CPT | Performed by: PHYSICIAN ASSISTANT

## 2018-01-04 PROCEDURE — 25000132 ZZH RX MED GY IP 250 OP 250 PS 637: Mod: GY | Performed by: PHYSICIAN ASSISTANT

## 2018-01-04 PROCEDURE — 12000007 ZZH R&B INTERMEDIATE

## 2018-01-04 PROCEDURE — 84100 ASSAY OF PHOSPHORUS: CPT | Performed by: PHYSICIAN ASSISTANT

## 2018-01-04 PROCEDURE — 25000128 H RX IP 250 OP 636: Performed by: PHYSICIAN ASSISTANT

## 2018-01-04 PROCEDURE — 80048 BASIC METABOLIC PNL TOTAL CA: CPT | Performed by: PHYSICIAN ASSISTANT

## 2018-01-04 PROCEDURE — 85027 COMPLETE CBC AUTOMATED: CPT | Performed by: PHYSICIAN ASSISTANT

## 2018-01-04 PROCEDURE — 00000146 ZZHCL STATISTIC GLUCOSE BY METER IP

## 2018-01-04 RX ADMIN — ACETAMINOPHEN 1000 MG: 10 INJECTION, SOLUTION INTRAVENOUS at 10:12

## 2018-01-04 RX ADMIN — CEFAZOLIN SODIUM 1 G: 10 INJECTION, POWDER, FOR SOLUTION INTRAVENOUS at 02:03

## 2018-01-04 RX ADMIN — LEVOTHYROXINE SODIUM 88 MCG: 88 TABLET ORAL at 08:46

## 2018-01-04 RX ADMIN — ACETAMINOPHEN 1000 MG: 10 INJECTION, SOLUTION INTRAVENOUS at 04:51

## 2018-01-04 RX ADMIN — CEFAZOLIN SODIUM 1 G: 10 INJECTION, POWDER, FOR SOLUTION INTRAVENOUS at 11:02

## 2018-01-04 NOTE — PLAN OF CARE
Problem: Surgery Nonspecified (Adult)  Goal: Signs and Symptoms of Listed Potential Problems Will be Absent, Minimized or Managed (Surgery Nonspecified)  Signs and symptoms of listed potential problems will be absent, minimized or managed by discharge/transition of care (reference Surgery Nonspecified (Adult) CPG).   Outcome: No Change  A/O. VSS except for hypertensive at times. LS clear. BS hypo, no flatus. Pinedo patent with adequate output. Lap sites CDI, some scant drainage from the lap site in the umbilicus. Managing pain with scheduled tylenol. Zofran and Compazine given for PONV, had small amount of emesis. Pt dangled on shift.

## 2018-01-04 NOTE — DISCHARGE INSTRUCTIONS
Discharge Instructions following Gynecological Laparoscopy  [facility]    Diet:   -Diet as tolerated.  [place] diet or light diet is advisable the day of surgery.  Drink plenty of fluids.    Activity:   -May resume normal activity as soon as abdominal pain disappears.  Listen to your body-if it hurts, don't do it.  Avoid douches, tampons, & intercourse for 1-2 weeks.    Bathing/Incision Care:   -Baths or showers are acceptable.  Bandaids may be removed at anytime.  There is usually as suture (stitch) in the incision under the skin that will dissolve on own & will not need to be removed.    What to expect:   -May have vaginal bleeding or discharge for 1-2 weeks.  Possible abdominal & shoulder discomfort due to gas remaining in the abdomen.  This should resolve in 24-48 hours; short, frequent walks may help relieve this.    Notify your surgeon for the following signs & symptoms:   -Temperature over 100.4 degrees   -Vaginal bleeding in excess of one pad per hour   -Uncontrolled pain   -Incisions becoming more swollen, warm, reddened, or painful

## 2018-01-04 NOTE — PROGRESS NOTES
I spoke with her this morning about the TVT sling placement, and why I elected to add that. She was relieved she would not have to come back. Her tissues are very elastic. I explained the details of this mornings voiding trial. She is to void without valsalva, and it is okay if she shifts forward of back aburto on the toilet. Once she is done, she is to page the nurse and have bladder scan done to assess post-void residual. They are to call me if thePVR is >250 cc.    Discussed the adhesions, and the rest of the case. Uterus looked benign. Good sign that she is in essentially no pain and only taking Tylenol.     Home later today after voiding trials complete.

## 2018-01-04 NOTE — DISCHARGE SUMMARY
[facility] Discharge Summary      Yasmin Larry MRN# [id number]   Age: 77 year old YOB: 1940     Date of Admission:  1/3/2018  Date of Discharge::  1/4/2018  Admitting Physician:  Anusha Becerra MD  Discharge Physician:  Anusha Becerra MD     PCP:  Nae Looney    Disposition: Patient discharged from [facility] to home in stable condition.        Primary Diagnosis:   Pelvic organ prolapse with rectocele, rectal intussusception, enterocele and uterovaginal prolapse, cystocele and bowel adhesions.             Discharge Medications:   Current Discharge Medication List      START taking these medications    Details   oxyCODONE-acetaminophen (PERCOCET) 5-325 MG per tablet Take 1-2 tablets by mouth every 4 hours as needed for pain (moderate to severe)  Qty: 30 tablet, Refills: 0    Associated Diagnoses: Female genital prolapse, unspecified type         CONTINUE these medications which have NOT CHANGED    Details   Calcium Citrate-Vitamin D (CALCIUM CITRATE + D PO) Take 1 tablet by mouth 2 times daily      Omega-3 Fatty Acids (FISH OIL PO) Take 1 g by mouth daily      Cyanocobalamin (B-12 DOTS PO) Take 1 capsule by mouth daily      Ascorbic Acid (VITAMIN C PO) Take 1 tablet by mouth daily      Acetaminophen (TYLENOL PO) Take 500-1,000 mg by mouth 3 times daily as needed for mild pain or fever      Misc Natural Products (NATURAL EMU RELIEF EX) Externally apply topically nightly as needed (Pain to Leg)      ASPIRIN PO Take 81 mg by mouth At Bedtime      LEVOTHYROXINE SODIUM PO Take 88 mcg by mouth every morning       NADOLOL PO Take 10 mg by mouth every morning (0.5 x 20 mg tablet = 10 mg dose)      ATORVASTATIN CALCIUM PO Take 20 mg by mouth At Bedtime      AMLODIPINE BESYLATE PO Take 5 mg by mouth At Bedtime      LOSARTAN POTASSIUM PO Take 50 mg by mouth every morning      OMEPRAZOLE PO Take 20 mg by mouth every other day       traZODone (DESYREL) 50 MG tablet Take 50 mg  by mouth At Bedtime.                    Follow Up, Special Instructions:   Discharge diet: Full liquid   Discharge activity: No lifting, driving, or strenuous exercise for 8 week(s)   Discharge follow-up: Follow up with Dr. Becerra in 3-4 weeks   Wound care: Keep wound clean and dry              Procedures:   Procedure(s): Robotic ventral rectopexy and extensive lysis of adhesion by Dr. Becerra and robotic supracervical hysterectomy and BSO, robotic sacrocolpopexy, and transvaginal sling and cystoscopy by Dr. López               Consultations:   none          Brief Hospital Summary:   Patient is a 77 year old woman.  she underwent the above procedure on 1/3/18 by Dr. Beecrra and Dr. López.   There were no immediate complications during this procedure.    Please refer to the full operative summary for details.  The patient's hospital course was unremarkable.  she recovered as anticipated and experienced no post-operative complications.         Attestation:  I have reviewed today's vital signs, notes, medications, labs and imaging.    Helen CABRERAC  [facility]          ADDENDUM:  Length of stay: 1 days  Indicate Y or N for the following:  UTI  No  C diff  No  PNA  No  SSI No  DVT No  PE  No  CVA No  MI No  Enterocutaneous fistula  No  Peripheral nerve injury  No  Abscess (not adjacent to anastomosis)  No  Leak No    Treated with:   Antibiotics N/A   Drain  N/A   Reoperation  N/A  Death within 30 days No  Reintubation  No  Reoperation  No   Procedure NA      Time spent:  less then 30 minutes spent in counseling and coordination of care for discharge.

## 2018-01-04 NOTE — PLAN OF CARE
Problem: Patient Care Overview  Goal: Plan of Care/Patient Progress Review  Outcome: Adequate for Discharge Date Met: 01/04/18  VSS on RA. Tylenol for pain. Lap sites with liquid bandage. Scant vaginal spotting. Up ambulating SBA. Voiding trail today - PVR okay. No gas yet but instructions to go home on full liquids until having gas. No nausea. Discharge instructions and pain medication script sent home with patient. Questions answers. Discharging home with .

## 2018-01-04 NOTE — PROGRESS NOTES
SP[facility]UAL [facility] Progress Note  [facility]    Visited pt and pt's  Bo per length of stay. Pt is recovering from pelvic surgery and is anticipating discharge this afternoon. Pt was in good spirits, and reported that the surgery went well. Pt and pt's  marveled over what can be accomplished with modern medicine. Pt has two children, a son who lives in [place] and a daughter who lives in [place]. Pt is remarried and pt's  has 3 adult children. Pt finds support from her family and her Christianity oskar.  prayed with pt and .  has no plans to follow due to upcoming discharge but is available upon request.      Kecia Pritchett  Chaplain Resident  Pager: [phone]  Office: [phone]

## 2018-01-04 NOTE — PLAN OF CARE
Problem: Patient Care Overview  Goal: Plan of Care/Patient Progress Review  Outcome: Improving  Patient A&O. VSS on 1 L nasal canula. Capnography in place. CMS intact. Incisions CDI/ROSY. Minimal vaginal spotting. Pinedo removed this AM. IVF infusing. Clear liquid diet. Will continue to monitor.

## 2018-01-04 NOTE — PROGRESS NOTES
[facility]  PROGRESS NOTE    January 4, 2018  Post-op Day # 1    SUBJECTIVE:  The patient is doing well. She denies nausea or vomiting. No flatus or bowel movement yet. Pain controlled.     OBJECTIVE:  Temp:  [97.1  F (36.2  C)-98  F (36.7  C)] 97.8  F (36.6  C)  Pulse:  [73-81] 73  Heart Rate:  [54-96] 75  Resp:  [9-21] 18  BP: (118-162)/() 149/67  SpO2:  [95 %-100 %] 97 %    Intake/Output Summary (Last 24 hours) at 01/04/18 0813  Last data filed at 01/04/18 0700   Gross per 24 hour   Intake             3219 ml   Output             2695 ml   Net              524 ml       GENERAL:  Awake, alert, no acute distress,   HEAD - Nomocephalic atraumatic  SCLERA anicteric  EXTREMITIES warm and well perfused  ABDOMEN:  Soft, appropriately tender, non-distended,   INCISION:  C/d/i,     LABS:  Lab Results   Component Value Date    WBC 15.4 01/04/2018     Lab Results   Component Value Date    HGB 12.5 01/04/2018     Lab Results   Component Value Date    HCT 37.0 01/04/2018     Lab Results   Component Value Date     01/04/2018     Last Basic Metabolic Panel:  Lab Results   Component Value Date     01/03/2018      Lab Results   Component Value Date    POTASSIUM 3.6 01/03/2018     Lab Results   Component Value Date    CHLORIDE 104 01/03/2018     Lab Results   Component Value Date    HUMA 9.1 01/03/2018     Lab Results   Component Value Date    CO2 26 01/03/2018     Lab Results   Component Value Date    BUN 8 01/03/2018     Lab Results   Component Value Date    CR 0.63 01/03/2018     Lab Results   Component Value Date     01/03/2018       ASSESSMENT/PLAN:POD#1 robotic ventral rectopexy by Dr. Becerra in combination with Dr. López for robotic sacrocolpopexy, hysterectomy and sling  1. Full liquids  2. PO PRN pain meds  3. Await voiding per Dr. López's orders  4. OOB, ambulate  5. Dispo: Plan to d/c home today    Helen Nascimento PA-C  [facility]  Phone:  [phone]      January 4, 2018  8:30 a.m.  Patient seen and examined.  Surgical findings reviewed.  Agree with assessment and plan above.  Able to d/c later today after voiding trial.    Anusha Becerra MD  [facility]  Pager:  [phone]  Phone: [phone]  Fax: [phone]

## 2018-01-07 NOTE — OP NOTE
PREOPERATIVE DIAGNOSIS: Pelvic organ prolapse with rectocele, rectal intussusception, enterocele and uterovaginal prolapse  POSTOPERATIVE DIAGNOSIS: Pelvic organ prolapse with rectocele, rectal intussusception, enterocele and uterovaginal prolapse   OPERATION PERFORMED:   1. Robotic extensive lysis of adhesions (Dr. Becerra)  2. Robotic supracervical hysterectomy and bilateral salpingo-oophorectomy (Dr. López)  3. Robotic ventral rectopexy with exclusion of the small bowel from the pelvis using mesh (Dr. Becerra)  4. Robotic sacrocolpopexy (Dr. López)  5. Rectocele repair (Dr. Becerra)  6. Transvaginal sling and cystoscopy (Dr. López)    SURGEON: Anusha Becerra MD   ASSISTANT: JOHN Alicea    COSURGEON: Robles López MD (and JOHN Ugarte as assist)     ANESTHESIA: General.      INDICATION: Yasmin Larry is a 77 year old female who has worsening symptoms of irregular bowel habits, difficult evacuation of stool, uterine prolapse and pelvic pressure. Defecography confirmed the presence of internal intussusception and rectocele with associated uterovaginal prolapse. She has combined anterior, middle and posterior pelvic compartment prolapse. The risks and benefits of proceeding with a robotic ventral rectopexy and sacrocolpopexy have been discussed with Yasmin Larry and she would like to proceed.      OPERATIVE FINDINGS:  There were extensive adhesions starting in the right upper quadrant and extending down into the pelvis from prior right nephrectomy.  At least 45 minutes was spent on extensive lysis of adhesions. The uterus, ovaries and bilateral fallopian tubes appeared grossly normal and were removed by Dr. López.The patient has a very deep pouch of Carmelo with significant laxity in her pelvic tissues. A ventral rectopexy and rectocele repair was performed using permanent, light weight prolene, mesh. A sheet of mesh was sutured to the levator muscles and anterior rectum, then Dr. López  secured this mesh to the Y-shaped mesh and brought it up to the vaginal apex and sacral promontory. The mesh was completely reperitonealized. Dr. López placed a transvaginal midurethral sling. Cystoscopy was performed by Dr. López and was normal.     PROCEDURE IN DETAIL: After informed consent was obtained, the patient was brought to the operating room and placed in the supine position on the operating room table. Sequential compression devices were placed on bilateral lower extremities prior to induction, and general anesthesia was induced without difficulty. She was placed in a well-padded dorsal lithotomy position and IV antibiotics were administered. A Pigazzi pink pad was used on the operating room table to prevent her from sliding off the table in steep Trendelenburg position. Her abdomen was sterilely prepped and draped in standard fashion.  Dr. López then placed a polanco catheter without difficulty. The Duc vaginal retractor was placed into the vagina and upward traction was placed on the vagina.   A 2 cm vertical incision was made in the umbilicus with a #11 blade after instillation of 0.5% Marcaine in the skin and subcutaneous tissue for placement of the gelport.  Adhesions between the omentum and anterior abdominal wall were taken down by gently blunt dissection and sharp dissection using Metzenbaum scissors.  This allowed adequate room for placement of gelport. The small gelport was inserted and the abdomen was insufflated with CO2 gas to a pressure of 15 mmHg. A 8 mm bladeless robotic trocar and 12 mm port were inserted into the abdominal cavity through the gelport. A 8 mm robotic scope was inserted into the abdomen, and the abdomen was explored. The operative findings are noted above. Five additional bladeless trocars were inserted into the abdominal cavity in the following locations after instillation of 0.5% Marcaine in the abdominal wall.   1. An 8 mm robotic port placed in the mid  clavicular line on the right side of the abdomen at least 8 cm lateral to the gelport.   2. An 8 mm robotic port was placed in the mid clavicular line on the left side of the abdomen at least 8 cm lateral to the gelport.   3. An 8 mm robotic port was placed 4 cm above the left superior iliac crest and 8 cm lateral from the other robotic port on the left side of the abdomen.   4. A 5 mm assistant port was placed on the right side of the abdomen 5 cm lateral and just inferior to the right sided robotic port.   The patient was placed in steep Trendelenburg position. The CrepeGuys XI robot was docked to the 4 robotic ports. Nery Pily remained as the beside assist while I worked at the robot console. The three robotic instruments were inserted into the abdominal cavity under direct visualization. The robotic 30 degree laparoscope was used.  There were extensive adhesions between the small bowel, colon, omentum, anterior abdominal wall and pelvis from her prior right nephrectomy.  There were also clips in the right lower quadrant with adherent small bowel.  The adhesions were carefully and tediously taken down and divided robotically using the robotic scissors and cautery.  This allowed for mobilization of the small bowel out of the pelvis and off of the sacral promontory.  The extensive lysis of adhesions took at least 45 minutes.  The sigmoid colon and small bowel were reflected completely out of the pelvis in order to better view the pelvic floor. The patient has a very deep pouch of Carmelo, and a lot of laxity in pelvic floor tissues.  Dr. López performed a robotic hysterectomy and bilateral salpingo-oophrectomy. Once this was completed, Dr. López began dissection for a sacrocolpopexy.   The peritoneum distal to where the inferior mesenteric artery was identified was opened using cautery with robotic scissors in robot arm #1. Eventually, the sacral promontory and the anterior longitudinal ligament was clearly  visible and suitable for the mesh to eventually be secured in this location. A tunnel was created under the peritoneum from the promontory to the right side of the uterosacro ligament.  The lateral ligaments remained intact. Dr. López raised flaps of peritoneum anteriorly and inferiorly over the vaginal apex.     I performed the deep pelvic dissection within the rectovaginal septum, working independently at the robot console. I continued to raise the flap inferiorly along the posterior wall of the vagina into the rectovaginal septum. Prior to opening the peritoneal reflection of the rectovaginal septum, the vagina was retracted towards the bladder using a vaginal retracting device.There was a large amount of redundancy and laxity in the tissue in this plane. Dissection proceeded within the rectovaginal septum for approximately 6-8 cm, down to the level of the levator muscles. The levator muscles were exposed on both sides of the rectum.  The dissection was carried down to the pelvic floor to ensure adequate repair of rectal intussusception and rectocele.  The rectum measured 5 cm in width at the level of the levator muscles.    The light weight, permanent mesh placed through the 5 mm right upper quadrant port and laid flat within the pelvis, with the distal portion extending over the anterior portion of the rectum at the level of the levator muscles.   In preparation for sewing the mesh into place, a needle  was placed in robotic arm #2 and robotic arm #1. Nery Nascimento, bedside assist, was able to pass 2-0 Ethibond sutures through the 12 mm assistant port, and 2-0 Ethibond suture was then used to secure the mesh to the levators on both sides of the rectum anteriorly. Nine additional 2-0 Vicryl sutures were then used to secure the mesh to the anterior portion of the rectum, progressing proximally on the rectum. These sutures were placed in seromuscular layers of the rectum.  The mesh was secured to the  anterior rectal wall at the most distal extent to ensure repair of rectocele.  The mesh was not on tension across the distal rectum. This completed the ventral rectopexy and rectocele repair portion of the mesh placement.    Dr. López then performed a sacrocolpopexy by securing the ventral rectopexy mesh to the posterior vagina with gortex suture.  This piece of mesh was then trimmed and the Y-mesh was placed overtop of the ventral rectopexy mesh.  Additional gortex sutures were placed through both pieces of mesh along the posterior vaginal wall.  The y-portion of the mesh came to the vaginal apex. There was appropriate laxity between the sutures on the anterior rectal wall and the the sutures to the posterior vagina, without undo tension. Two 2-0 Gortex sutures and a single 0-Ethibond suture were used to secure the mesh at the pre-cleared location on the sacral promontory. These stitches were secured to the anterior longitudinal ligament. The mesh sat comfortably within the pelvis.  The vaginal apex was brought up nearly to the sacral promontory due to the excessive laxity in the pelvic tissues. The mesh was completely reperitonealized by closing the peritoneum using 2-0 Vicryl suture at the promontory and 2-0 Vicryl suture over the vaginal apex.  Dr. López rescrubbed into the procedure to remove the uterus and ovaries from the abdominal cavity.  All robotic instruments were removed from the abdomen and the robot was undocked.  The fascia of the gelport incision was closed using 0-PDS suture in running fashion. The skin of the port sites and gelport site was closed with 4-0 Monocryl in interrupted fashion and dermabond. Dr. López performed a midurethral sling and cystoscopy which was normal.    The patient tolerated this procedure well, without complications. Estimated blood loss was 20 mL. I was present and scrubbed for my portion of this operation. The patient was returned to the supine position, extubated  in the operating room, and brought to the recovery room in stable condition.     SYLVIE NICOLE MD

## 2018-01-21 NOTE — OP NOTE
DATE OF PROCEDURE:  01/03/2018      PREOPERATIVE DIAGNOSES:   1.  Stage II uterovaginal prolapse.   2.  Cystocele.   3.  Rectocele.   4.  Stress incontinence.   5.  Bowel adhesions.   6.  Rectal intussusception.   7.  Enterocele.      PROCEDURES:     1.  da Sherry Xi supracervical hysterectomy (Dr. López).   2.  Bilateral salpingo-oophorectomy (Dr. López).   3.  Sacral colpopexy (Dr. López).     4.  Transvaginal sling with cystoscopy (Dr. López).   5.  da Sherry Xi ventral rectopexy (Dr. Becerra).     6.  Laparoscopic extensive lysis of adhesions (Dr. Becerra).   7.  Rectocele repair (Dr. Becerra).      SURGEONS:     PANEL I:  Robles López MD     ASSISTANT:  Nya Larose PA-C      PANEL II:  Anusha Becerra MD   ASSISTANT:  Helen Nascimento PA-C      ESTIMATED BLOOD LOSS:  20 mL      ANESTHESIA:  General.      COMPLICATIONS:  None.      SPECIMENS:  Uterus and bilateral fallopian tubes with ovaries sent to pathology.      FINDINGS:  The patient had a prior right nephrectomy and some bowel work done and there were extensive adhesions in the midline where we placed our GelPOINT port.  Dr. Becerra removed these laparoscopically and through a mini laparotomy without incident.  Please see her dictation for further details.      We placed a Duc surgical vaginal manipulator in the lower field and a Pinedo catheter in the bladder.  Gloves were changed and attention was turned to the abdomen.      The patient had a umbilical GelPOINT port placed, had bilateral da Sherry Xi trocars to the right and left of the umbilicus by approximately 9-10 cm.  She had a right lower quadrant assistant port and a left lower quadrant 3rd arm da Sherry port placed.  She was placed in steep Trendelenburg and the da Sherry robot was docked.  I then took my place at the operative console.  Dr. Becerra had done an extensive lysis of adhesions in the abdomen to make port placement possible.  I then grasped the left infundibulopelvic artery and vein,  taking care to avoid the ureter.  We electrocoagulated x 2 and I cut with cold scissors.  We then cut through the filmy attachment to the left pelvic sidewall until we got down to the midpoint of the left broad ligament.  This was electrocoagulated x 2 and cut with the scissors.  We then opened the broad ligament, anterior and posterior leaves.  We cut the posterior leaf of the broad ligament down to the cervical isthmus near the uterosacral attachment.  Next, we cut the anterior leaf of the broad ligament, carrying our dissection over the vesicouterine peritoneal folds on the front of the uterus.  This skeletonized the vasculature which was electrocoagulated x 2 and cut at the cervical isthmus.  A similar procedure occurred on the patient's right side, and without difficulty.  We then amputated the uterus.  I created a pocket anteriorly, carrying the dissection down the front of the vagina between the vagina and the bladder.  Most of this was done bluntly.  Isolated bleeders were electrocoagulated.  We then did a dissection posteriorly to create a pocket between the peritoneum and the posterior wall of the uterus.  I created a small incision over the sacral promontory in L5.  We carried this dissection down until we could clearly see the anterior longitudinal ligament.  We then tunneled out in the distribution of the right uterosacral ligament coming out close to our planned cervical stump.  Here we made an incision in the retroperitoneum and a peritoneal tunnel was completed.  At this point, I turned over the procedure to Dr. Becerra, who dissected the anterior rectal segment and the levators to the right and left.  She did appropriate measuring and we planned the size and configuration of our Artisyn mesh.  We then cut the mesh to fit and Dr. Becerra sewed it into place with 0 Ethibond sutures.  Please see her dictation for further details.      After her portion of the case was completed, I sewed the Artisyn mesh  to the posterior aspect of the vagina near the cervix.  We did this with 3 to 4 stitches of 2-0 Polk-Blake.  The cervix had been oversewn, so the endocervix was closed with a 6 inch length of 2-0 Covidien V-Loc 180 stitch.  We took the mesh flap, brought it over the cervical stump and sewed it to the anterior wall of the vagina using 7 simple interrupted stitches of 2-0 Polk-Blake.  The mesh strap was pulled through the peritoneal tunnel and under appropriate tension, it was affixed to the sacral promontory.  We did this with 2 simple interrupted stitches of 2-0 Polk-Blake.  Excess mesh was cut at the promontory and removed from the field.  We closed the peritoneum over the promontory with 3-0 Vicryl.  Dr. Becerra closed the peritoneal closure and covered the mesh in the low pelvis.  Please see her dictation for further details.     As Dr. Becerra closed the abdominal wounds, I took my place at the lower field. We removed the polanco and placed a cystoscope. We saw urojets, and no bladder lacerations. We removed the cystoscope ad did a coude test which showed genuine objective leak. We grasped the mid-urethral fold and the anterior wall of the bladder with Allis clamps. We then injected with local and epinephrine from the sub urethral space out the the undersurface of the symphysis pubis. A 1 cm incision was made with a 15 blade scalpel. We used a Metzenbaum scissors to dissect to the undersurface of the symphysis pubis. We then place the right and left TVT Exact trocars lateral to the urethra, and repeated our cystoscopy. No bladder trauma was seen. We placed a 9 mm Heger spacer and deployed the mesh by removing the plastic sheathing once the tape was in the proper position. Excess mesh was cut at the Mons Pubis and the skin was closed with dermabond. The vaginal skin was closed with 2-0 Vicryl and the Polanco was replaced. At this point the  Case was complete. Needle, sponge and instrument counts were correct. Patient  tolerated the procedure well.     Disposition: Patient is in Satisfactory stable condition and is in recovery.     BRANDON VAZQUEZ MD             D: 2018 09:47   T: 2018 19:05   MT: HARSH      Name:     HEAVEN CHEATHAM   MRN:      [id number]        Account:        [id number]   :      1940           Procedure Date: 2018      Document: [id number]

## 2018-03-02 ENCOUNTER — HOSPITAL ENCOUNTER (EMERGENCY)
Facility: CLINIC | Age: 78
Discharge: HOME | End: 2018-03-02
Attending: EMERGENCY MEDICINE | Admitting: EMERGENCY MEDICINE
Payer: MEDICARE

## 2018-03-02 VITALS
WEIGHT: 126 LBS | OXYGEN SATURATION: 97 % | RESPIRATION RATE: 16 BRPM | HEART RATE: 72 BPM | BODY MASS INDEX: 24.74 KG/M2 | DIASTOLIC BLOOD PRESSURE: 81 MMHG | HEIGHT: 60 IN | TEMPERATURE: 98 F | SYSTOLIC BLOOD PRESSURE: 187 MMHG

## 2018-03-02 DIAGNOSIS — N39.0 URINARY TRACT INFECTION IN FEMALE: ICD-10-CM

## 2018-03-02 LAB
ALBUMIN UR-MCNC: NEGATIVE MG/DL
APPEARANCE UR: CLEAR
BACTERIA #/AREA URNS HPF: ABNORMAL /HPF
BILIRUB UR QL STRIP: NEGATIVE
COLOR UR AUTO: ABNORMAL
GLUCOSE UR STRIP-MCNC: NEGATIVE MG/DL
HGB UR QL STRIP: ABNORMAL
HYALINE CASTS #/AREA URNS LPF: 1 /LPF (ref 0–2)
KETONES UR STRIP-MCNC: NEGATIVE MG/DL
LEUKOCYTE ESTERASE UR QL STRIP: ABNORMAL
NITRATE UR QL: NEGATIVE
PH UR STRIP: 7 PH (ref 5–7)
RBC #/AREA URNS AUTO: 2 /HPF (ref 0–2)
SOURCE: ABNORMAL
SP GR UR STRIP: 1.01 (ref 1–1.03)
SPECIMEN SOURCE: NORMAL
SQUAMOUS #/AREA URNS AUTO: <1 /HPF (ref 0–1)
UROBILINOGEN UR STRIP-MCNC: 0 MG/DL (ref 0–2)
WBC #/AREA URNS AUTO: 30 /HPF (ref 0–5)
WET PREP SPEC: NORMAL

## 2018-03-02 PROCEDURE — 99283 EMERGENCY DEPT VISIT LOW MDM: CPT

## 2018-03-02 PROCEDURE — 87086 URINE CULTURE/COLONY COUNT: CPT | Performed by: EMERGENCY MEDICINE

## 2018-03-02 PROCEDURE — 87088 URINE BACTERIA CULTURE: CPT | Performed by: EMERGENCY MEDICINE

## 2018-03-02 PROCEDURE — 81001 URINALYSIS AUTO W/SCOPE: CPT | Performed by: EMERGENCY MEDICINE

## 2018-03-02 PROCEDURE — 87186 SC STD MICRODIL/AGAR DIL: CPT | Performed by: EMERGENCY MEDICINE

## 2018-03-02 PROCEDURE — 87210 SMEAR WET MOUNT SALINE/INK: CPT | Performed by: EMERGENCY MEDICINE

## 2018-03-02 RX ORDER — CEPHALEXIN 500 MG/1
500 CAPSULE ORAL 2 TIMES DAILY
Qty: 10 CAPSULE | Refills: 0 | Status: SHIPPED | OUTPATIENT
Start: 2018-03-02 | End: 2018-03-07

## 2018-03-02 ASSESSMENT — ENCOUNTER SYMPTOMS
VOMITING: 0
BACK PAIN: 1
FEVER: 0
FREQUENCY: 1
DYSURIA: 0

## 2018-03-02 NOTE — ED AVS SNAPSHOT
" [facility] Emergency Department    [address] [place] [address]    Phone:  [phone]    Fax:  [phone]                                       Yasmin Larry   MRN: [id number]    Department:  [facility] Emergency Department   Date of Visit:  3/2/2018           Patient Information     Date Of Birth          1940        Your diagnoses for this visit were:     Urinary tract infection in female        You were seen by David Mcmullen MD.      Follow-up Information     Follow up with Nae Looney In 1 week.    Specialty:  Internal Medicine    Why:  As needed    Contact information:    [facility]  [address]  [place] [address]  [phone]          Discharge Instructions          * BLADDER INFECTION,Female (Adult)    A bladder infection (\"cystitis\" or \"UTI\") usually causes a constant urge to urinate and a burning when passing urine. Urine may be cloudy, smelly or dark. There may be pain in the lower abdomen. A bladder infection occurs when bacteria from the vaginal area enter the bladder opening (urethra). This can occur from sexual intercourse, wearing tight clothing, dehydration and other factors.  HOME CARE:  1. Drink lots of fluids (at least 6-8 glasses a day, unless you must restrict fluids for other medical reasons). This will force the medicine into your urinary system and flush the bacteria out of your body. Cranberry juice has been shown to help clear out the bacteria.  2. Avoid sexual intercourse until your symptoms are gone.  3. A bladder infection is treated with antibiotics. You may also be given Pyridium (generic = phenazopyridine) to reduce the burning sensation. This medicine will cause your urine to become a bright orange color. The orange urine may stain clothing. You may wear a pad or panty-liner to protect clothing.  PREVENTING FUTURE INFECTIONS:  1. Always wipe from front to back after a bowel movement.  2. Keep " the genital area clean and dry.  3. Drink plenty of fluids each day to avoid dehydration.  4. Urinate right after intercourse to flush out the bladder.  5. Wear cotton underwear and cotton-lined panty hose; avoid tight-fitting pants.  6. If you are on birth control pills and are having frequent bladder infections, discuss with your doctor.  FOLLOW UP: Return to this facility or see your doctor if ALL symptoms are not gone after three days of treatment.  GET PROMPT MEDICAL ATTENTION if any of the following occur:    Fever over 101 F (38.3 C)    No improvement by the third day of treatment    Increasing back or abdominal pain    Repeated vomiting; unable to keep medicine down    Weakness, dizziness or fainting    Vaginal discharge    Pain, redness or swelling in the labia (outer vaginal area)    5869-2769 The [employer]. [address] [place], [place] [address]. All rights reserved. This information is not intended as a substitute for professional medical care. Always follow your healthcare professional's instructions.  This information has been modified by your health care provider with permission from the publisher.      24 Hour Appointment Hotline       To make an appointment at any [facility] (formerly [facility]3], call [phone] ([phone]). If you don't have a family doctor or clinic, we will help you find one. [place] clinics are conveniently located to serve the needs of you and your family.             Review of your medicines      START taking        Dose / Directions Last dose taken    cephALEXin 500 MG capsule   Commonly known as:  KEFLEX   Dose:  500 mg   Quantity:  10 capsule        Take 1 capsule (500 mg) by mouth 2 times daily for 5 days   Refills:  0          Our records show that you are taking the medicines listed below. If these are incorrect, please call your family doctor or clinic.        Dose / Directions Last dose taken    AMLODIPINE BESYLATE PO   Dose:  5 mg        Take 5 mg by mouth At  Bedtime   Refills:  0        ASPIRIN PO   Dose:  81 mg        Take 81 mg by mouth At Bedtime   Refills:  0        ATORVASTATIN CALCIUM PO   Dose:  20 mg        Take 20 mg by mouth At Bedtime   Refills:  0        B-12 DOTS PO   Dose:  1 capsule        Take 1 capsule by mouth daily   Refills:  0        CALCIUM CITRATE + D PO   Dose:  1 tablet        Take 1 tablet by mouth 2 times daily   Refills:  0        FISH OIL PO   Dose:  1 g        Take 1 g by mouth daily   Refills:  0        LEVOTHYROXINE SODIUM PO   Dose:  88 mcg        Take 88 mcg by mouth every morning   Refills:  0        LOSARTAN POTASSIUM PO   Dose:  50 mg        Take 50 mg by mouth every morning   Refills:  0        NADOLOL PO   Dose:  10 mg        Take 10 mg by mouth every morning (0.5 x 20 mg tablet = 10 mg dose)   Refills:  0        NATURAL EMU RELIEF EX        Externally apply topically nightly as needed (Pain to Leg)   Refills:  0        OMEPRAZOLE PO   Dose:  20 mg        Take 20 mg by mouth every other day   Refills:  0        traZODone 50 MG tablet   Commonly known as:  DESYREL   Dose:  50 mg        Take 50 mg by mouth At Bedtime.   Refills:  0        TYLENOL PO   Dose:  500-1000 mg        Take 500-1,000 mg by mouth 3 times daily as needed for mild pain or fever   Refills:  0        VITAMIN C PO   Dose:  1 tablet        Take 1 tablet by mouth daily   Refills:  0                Prescriptions were sent or printed at these locations (1 Prescription)                   Other Prescriptions                Printed at Department/Unit printer (1 of 1)         cephALEXin (KEFLEX) 500 MG capsule                Procedures and tests performed during your visit     UA with Microscopic    Wet prep      Orders Needing Specimen Collection     None      Pending Results     No orders found from 2/28/2018 to 3/3/2018.            Pending Culture Results     No orders found from 2/28/2018 to 3/3/2018.            Pending Results Instructions     If you had any lab  results that were not finalized at the time of your Discharge, you can call the ED Lab Result RN at [phone]. You will be contacted by this team for any positive Lab results or changes in treatment. The nurses are available 7 days a week from 10A to 6:30P.  You can leave a message 24 hours per day and they will return your call.        Test Results From Your Hospital Stay        3/2/2018  9:05 PM      Component Results     Component Value Ref Range & Units Status    Color Urine Straw  Final    Appearance Urine Clear  Final    Glucose Urine Negative NEG^Negative mg/dL Final    Bilirubin Urine Negative NEG^Negative Final    Ketones Urine Negative NEG^Negative mg/dL Final    Specific Gravity Urine 1.010 1.003 - 1.035 Final    Blood Urine Small (A) NEG^Negative Final    pH Urine 7.0 5.0 - 7.0 pH Final    Protein Albumin Urine Negative NEG^Negative mg/dL Final    Urobilinogen mg/dL 0.0 0.0 - 2.0 mg/dL Final    Nitrite Urine Negative NEG^Negative Final    Leukocyte Esterase Urine Moderate (A) NEG^Negative Final    Source Midstream Urine  Final    WBC Urine 30 (H) 0 - 5 /HPF Final    RBC Urine 2 0 - 2 /HPF Final    Bacteria Urine Many (A) NEG^Negative /HPF Final    Squamous Epithelial /HPF Urine <1 0 - 1 /HPF Final    Hyaline Casts 1 0 - 2 /LPF Final         3/2/2018  8:58 PM      Component Results     Component    Specimen Description    Vagina    Wet Prep    No Trichomonas seen    Wet Prep    No yeast seen    Wet Prep    No clue cells seen    Wet Prep    Few  PMNs seen                  Clinical Quality Measure: Blood Pressure Screening     Your blood pressure was checked while you were in the emergency department today. The last reading we obtained was  BP: 187/81 . Please read the guidelines below about what these numbers mean and what you should do about them.  If your systolic blood pressure (the top number) is less than 120 and your diastolic blood pressure (the bottom number) is less than 80, then your blood  "pressure is normal. There is nothing more that you need to do about it.  If your systolic blood pressure (the top number) is 120-139 or your diastolic blood pressure (the bottom number) is 80-89, your blood pressure may be higher than it should be. You should have your blood pressure rechecked within a year by a primary care provider.  If your systolic blood pressure (the top number) is 140 or greater or your diastolic blood pressure (the bottom number) is 90 or greater, you may have high blood pressure. High blood pressure is treatable, but if left untreated over time it can put you at risk for heart attack, stroke, or kidney failure. You should have your blood pressure rechecked by a primary care provider within the next 4 weeks.  If your provider in the emergency department today gave you specific instructions to follow-up with your doctor or provider even sooner than that, you should follow that instruction and not wait for up to 4 weeks for your follow-up visit.        Thank you for choosing [facility]       Thank you for choosing [facility] for your care. Our goal is always to provide you with excellent care. Hearing back from our patients is one way we can continue to improve our services. Please take a few minutes to complete the written survey that you may receive in the mail after you visit with us. Thank you!        [employer] Information     [email or web address] lets you send messages to your doctor, view your test results, renew your prescriptions, schedule appointments and more. To sign up, go to [email or web address] . Click on \"Log in\" on the left side of the screen, which will take you to the Welcome page. Then click on \"Sign up Now\" on the right side of the page.     You will be asked to enter the access code listed below, as well as some personal information. Please follow the directions to create your username and password.     Your access code is: [email or web address]  Expires: 4/4/2018 10:27 AM     Your access code will "  in 90 days. If you need help or a new code, please call your [place] clinic or [phone].        Care EveryWhere ID     This is your Care EveryWhere ID. This could be used by other organizations to access your [place] medical records  [id number]        Equal Access to Services     LEXUS LONGORIA : Alisha contreras Sokerri, waaxda luqadaha, qaybta kaalmaerin menchaca, luis valdez. So [facility] [phone].    ATENCIÓN: Si habla español, tiene a garcia disposición servicios gratuitos de asistencia lingüística. Llame al [phone].    We comply with applicable federal civil rights laws and [place] laws. We do not discriminate on the basis of race, color, national origin, age, disability, sex, sexual orientation, or gender identity.            After Visit Summary       This is your record. Keep this with you and show to your community pharmacist(s) and doctor(s) at your next visit.

## 2018-03-02 NOTE — ED AVS SNAPSHOT
[facility] Emergency Department    [address]    [facility] [address]    Phone:  [phone]    Fax:  [phone]                                       Yasmin Larry   MRN: [id number]    Department:  [facility] Emergency Department   Date of Visit:  3/2/2018           After Visit Summary Signature Page     I have received my discharge instructions, and my questions have been answered. I have discussed any challenges I see with this plan with the nurse or doctor.    ..........................................................................................................................................  Patient/Patient Representative Signature      ..........................................................................................................................................  Patient Representative Print Name and Relationship to Patient    ..................................................               ................................................  Date                                            Time    ..........................................................................................................................................  Reviewed by Signature/Title    ...................................................              ..............................................  Date                                                            Time

## 2018-03-03 NOTE — ED NOTES
Pt provided with discharge paperwork and educated on recommended follow-up with PCP. Pt educated on how to take prescription for antibiotic. Pt voiced understanding and denied any questions at discharge.

## 2018-03-03 NOTE — ED NOTES
Pt arrives to the ED for evaluation of UTI symptoms. Pt states having symptoms for the past week. Pt states having vaginal itching, back pain and urinary frequency. Pt denies any pain with urination and states she normally doesn't get that symptom when she gets UTI's. Denies nausea/vomiting/fevers.

## 2018-03-03 NOTE — DISCHARGE INSTRUCTIONS
"   * BLADDER INFECTION,Female (Adult)    A bladder infection (\"cystitis\" or \"UTI\") usually causes a constant urge to urinate and a burning when passing urine. Urine may be cloudy, smelly or dark. There may be pain in the lower abdomen. A bladder infection occurs when bacteria from the vaginal area enter the bladder opening (urethra). This can occur from sexual intercourse, wearing tight clothing, dehydration and other factors.  HOME CARE:  1. Drink lots of fluids (at least 6-8 glasses a day, unless you must restrict fluids for other medical reasons). This will force the medicine into your urinary system and flush the bacteria out of your body. Cranberry juice has been shown to help clear out the bacteria.  2. Avoid sexual intercourse until your symptoms are gone.  3. A bladder infection is treated with antibiotics. You may also be given Pyridium (generic = phenazopyridine) to reduce the burning sensation. This medicine will cause your urine to become a bright orange color. The orange urine may stain clothing. You may wear a pad or panty-liner to protect clothing.  PREVENTING FUTURE INFECTIONS:  1. Always wipe from front to back after a bowel movement.  2. Keep the genital area clean and dry.  3. Drink plenty of fluids each day to avoid dehydration.  4. Urinate right after intercourse to flush out the bladder.  5. Wear cotton underwear and cotton-lined panty hose; avoid tight-fitting pants.  6. If you are on birth control pills and are having frequent bladder infections, discuss with your doctor.  FOLLOW UP: Return to this facility or see your doctor if ALL symptoms are not gone after three days of treatment.  GET PROMPT MEDICAL ATTENTION if any of the following occur:    Fever over 101 F (38.3 C)    No improvement by the third day of treatment    Increasing back or abdominal pain    Repeated vomiting; unable to keep medicine down    Weakness, dizziness or fainting    Vaginal discharge    Pain, redness or swelling in " the labia (outer vaginal area)    6291-8389 [employer]. [address] [place] [address], [place] [address]. All rights reserved. This information is not intended as a substitute for professional medical care. Always follow your healthcare professional's instructions.  This information has been modified by your health care provider with permission from the publisher.

## 2018-03-03 NOTE — ED PROVIDER NOTES
History     Chief Complaint:  Rule out Urinary Tract Infection      HPI   Yasmin Larry is a 78 year old female who presents to the emergency department today for evaluation of urinary frequency. The patient states that for the past week she has had urinary frequency, which causes her to wake up every 2-2.5 hours to urinate. She also describes vaginal itching and low back pain which is distinct from her arthritis. She denies fever, vomiting, dysuria, urinary urgency, vaginal swelling, vaginal discharge, vaginal odor, or a history of kidney or ureter stones. The patient reports that she has had frequent UTI's for the past two to three years. She notes that she had a hysterectomy and rectopexy performed on 1/3/18 by Dr. Becerra and Dr. López. She states that three weeks after this procedure she had another UTI.    Allergies:  No Known Drug Allergies     Medications:    Tylenol  Aspirin  Levothyroxine sodium  Nadolol  Atorvastatin calcium  Amlodipine besylate  Losartan potassium  Omeprazole  Trazodone    Past Medical History:    Arthritis  Cerebral artery occlusion with cerebral infarction  Hypertension  Hypothyroid  Chronic pain  PONV  Sleep apnea  Prolapse of female pelvic organs    Past Surgical History:    Cholecystectomy  Cystoscopy, sling transvaginal  DaVinci Hysterectomy supracervical, sacrocolpopexy, combined  DaVinci rectopexy  DaVinci salpingo-oophorectomy including bilateral  Ventral hernia repair  Laparoscopic lysis of adhesions  Nephrectomuy    Family History:    History reviewed. No pertinent family history.    Social History:  The patient was accompanied to the ED by her .  Smoking Status: Never Smoker  Smokeless Tobacco: Never Used  Alcohol Use: Positive  Marital Status:       Review of Systems   Constitutional: Negative for fever.   Gastrointestinal: Negative for vomiting.   Genitourinary: Positive for frequency. Negative for dysuria, urgency and vaginal discharge.         Positive for vaginal itching. Negative for vaginal swelling or odor.   Musculoskeletal: Positive for back pain (low).   All other systems reviewed and are negative.    Physical Exam     Patient Vitals for the past 24 hrs:   BP Temp Temp src Pulse Heart Rate Resp SpO2 Height Weight   03/02/18 2018 187/81 98  F (36.7  C) Oral 72 72 16 97 % 1.524 m (5') 57.2 kg (126 lb)     Physical Exam   HENT:   Right Ear: External ear normal.   Left Ear: External ear normal.   Nose: Nose normal.   Eyes: Right eye exhibits no discharge. Left eye exhibits no discharge. No scleral icterus.   Cardiovascular: Intact distal pulses.    Pulmonary/Chest: Effort normal.   Speaking in full sentences   Abdominal: She exhibits no distension. There is no tenderness.   Musculoskeletal:   No peripheral edema    Neurological:   Alert    No gross motor or sensory deficits   Skin: Skin is warm.   Psychiatric: She has a normal mood and affect. Judgment normal.   Nursing note and vitals reviewed.          Emergency Department Course     Laboratory:  Laboratory findings were communicated with the patient who voiced understanding of the findings.    UA with Microscopic: Blood Small (A), Leukocyte Esterase Moderate (A), WBC/HPF 30 (H), Bacteria Many (A), o/w WNL  Wet prep: No trichomonas seen, no yeast seen, no clue cells seen, few PMN's seen  Urine culture aerobic bacterial: pending    Emergency Department Course:  Nursing notes and vitals reviewed.  I performed an exam of the patient as documented above.   2121 I discussed the treatment plan with the patient. They expressed understanding of this plan and consented to discharge. They will be discharged home with instructions for care and follow up. In addition, the patient will return to the emergency department if their symptoms persist, worsen, if new symptoms arise or if there is any concern.  All questions were answered.  I personally reviewed the laboratory results with the patient and answered all  related questions prior to discharge.    Impression & Plan      Medical Decision Making:  Yasmin Larry is a 78 year old female who presents for evaluation of urinary frequency.  This clinically is consistent with a urinary tract infection.  Urinalysis confirms the infection.  There has been no fever, flank pain or significant abdominal pain.  There is no clinical evidence of pyelonephritis, appendicitis, colitis, diverticulitis or any intraabdominal catastrophe. The patient will be started on antibiotics for the infection. Return if increasing pain, vomiting, fever, or inability to tolerate the oral antibiotic.  Follow up with primary physician is indicated if not improving in 2-3 days.     Diagnosis:    ICD-10-CM    1. Urinary tract infection in female N39.0 Urine Culture Aerobic Bacterial       Disposition:  The patient is discharged to home.    Discharge Medications:  Discharge Medication List as of 3/2/2018  9:29 PM      START taking these medications    Details   cephALEXin (KEFLEX) 500 MG capsule Take 1 capsule (500 mg) by mouth 2 times daily for 5 days, Disp-10 capsule, R-0, Local Print             Scribe Disclosure:  CRISTIAN, Howard Rodney, am serving as a scribe at 8:38 PM on 3/2/2018 to document services personally performed by David Mcmullen MD, based on my observations and the provider's statements to me.  [facility] EMERGENCY DEPARTMENT       David Mcmullen MD  03/03/18 0108

## 2018-03-04 LAB
BACTERIA SPEC CULT: ABNORMAL
Lab: ABNORMAL
SPECIMEN SOURCE: ABNORMAL

## 2018-03-05 ENCOUNTER — TELEPHONE (OUTPATIENT)
Dept: EMERGENCY MEDICINE | Facility: CLINIC | Age: 78
End: 2018-03-05

## 2018-03-05 NOTE — TELEPHONE ENCOUNTER
[facility] Emergency Department Lab result notification:    [place] [facility] ED lab result protocol used  Urine Culture Protocol    Reason for call  Notify of lab results, assess symptoms,  review ED providers recommendations/discharge instructions (if necessary) and advise per ED lab result f/u protocol    Lab Result  Final Urine Culture Report on 3/5/18  [place] [facility] ED discharge antibiotic: Cephalexin (Keflex) 500 mg capsule, 1 capsule (500 mg) by mouth 2 times daily for 5 days.  #1. Bacteria, 50,000 to 100,000 colonies/mL Escherichia coli, is SUSCEPTIBLE to ED discharge antibiotic.    As per [place] [facility] ED Lab Result protocol, no change in antibiotic therapy.  Information table from ED Provider visit on 03/02/2018  Symptoms reported at ED visit (Chief complaint, HPI) is a 78 year old female who presents to the emergency department today for evaluation of urinary frequency. The patient states that for the past week she has had urinary frequency, which causes her to wake up every 2-2.5 hours to urinate. She also describes vaginal itching and low back pain which is distinct from her arthritis. She denies fever, vomiting, dysuria, urinary urgency, vaginal swelling, vaginal discharge, vaginal odor, or a history of kidney or ureter stones. The patient reports that she has had frequent UTI's for the past two to three years. She notes that she had a hysterectomy and rectopexy performed on 1/3/18 by Dr. Becerra and Dr. López. She states that three weeks after this procedure she had another UTI.   ED providers Impression and Plan (applicable information) a 78 year old female who presents for evaluation of urinary frequency.  This clinically is consistent with a urinary tract infection.  Urinalysis confirms the infection.  There has been no fever, flank pain or significant abdominal pain.  There is no clinical evidence of pyelonephritis, appendicitis, colitis, diverticulitis or any intraabdominal catastrophe. The patient will be  started on antibiotics for the infection. Return if increasing pain, vomiting, fever, or inability to tolerate the oral antibiotic.  Follow up with primary physician is indicated if not improving in 2-3 days.      RN Assessment (Patient s current Symptoms), include time called.  [Insert Left message here if message left]  At 1610 Left voicemail message requesting a call back to [phone] between 10 a.m. and 6:30 p.m., 7 days a week for patient's ED/UC lab results.  May leave a message 24/7, if no one available.     Chandni De Leon, RN  [place] [employer] RN  Lung Nodule and ED Lab Result F/u RN  Epic pool (ED late result f/u RN): [id number]  FV INCIDENTAL RADIOLOGY F/U NURSES: P [id number]  # [phone]      Copy of Lab result   Exam Information   Exam Date Exam Time Accession # Results    3/2/18  8:20 PM [id number]    Component Results   Component Collected Lab   Specimen Description 03/02/2018  8:20 PM 75   Midstream Urine   Special Requests 03/02/2018  8:20 PM 75   Specimen received in preservative   Culture Micro (Abnormal) 03/02/2018  8:20 PM 75   50,000 to 100,000 colonies/mL   Escherichia coli      Culture & Susceptibility   ESCHERICHIA COLI   Antibiotic Interpretation Sensitivity Unit Method Status   AMPICILLIN Sensitive <=2 ug/mL JANNET Final   AMPICILLIN/SULBACTAM Sensitive <=2 ug/mL JANNET Final   CEFAZOLIN Sensitive <=4 ug/mL JANNET Final   Comment: Cefazolin JANNET breakpoints are for the treatment of uncomplicated urinary tract   infections.  For the treatment of systemic infections, please contact the   laboratory for additional testing.   CEFEPIME Sensitive <=1 ug/mL JANNET Final   CEFOXITIN Sensitive <=4 ug/mL JANNET Final   CEFTAZIDIME Sensitive <=1 ug/mL JANNET Final   CEFTRIAXONE Sensitive <=1 ug/mL JANNET Final   CIPROFLOXACIN Sensitive <=0.25 ug/mL JANNET Final   GENTAMICIN Sensitive <=1 ug/mL JANNET Final   LEVOFLOXACIN Sensitive <=0.12 ug/mL JANNET Final   NITROFURANTOIN Sensitive <=16 ug/mL JANNET Final    Piperacillin/Tazo Sensitive <=4 ug/mL JANNET Final   TOBRAMYCIN Sensitive <=1 ug/mL JANNET Final   Trimethoprim/Sulfa Sensitive <=1/19 ug/mL JANNET

## 2018-03-18 ENCOUNTER — APPOINTMENT (OUTPATIENT)
Dept: ULTRASOUND IMAGING | Facility: CLINIC | Age: 78
End: 2018-03-18
Attending: EMERGENCY MEDICINE
Payer: MEDICARE

## 2018-03-18 ENCOUNTER — HOSPITAL ENCOUNTER (EMERGENCY)
Facility: CLINIC | Age: 78
Discharge: HOME | End: 2018-03-18
Attending: EMERGENCY MEDICINE | Admitting: EMERGENCY MEDICINE
Payer: MEDICARE

## 2018-03-18 VITALS
HEART RATE: 60 BPM | DIASTOLIC BLOOD PRESSURE: 76 MMHG | TEMPERATURE: 97.8 F | OXYGEN SATURATION: 99 % | SYSTOLIC BLOOD PRESSURE: 187 MMHG | RESPIRATION RATE: 16 BRPM

## 2018-03-18 DIAGNOSIS — N39.0 UTI (URINARY TRACT INFECTION) WITH PYURIA: ICD-10-CM

## 2018-03-18 DIAGNOSIS — M54.50 MIDLINE LOW BACK PAIN WITHOUT SCIATICA, UNSPECIFIED CHRONICITY: ICD-10-CM

## 2018-03-18 DIAGNOSIS — Z90.5 HISTORY OF NEPHRECTOMY: ICD-10-CM

## 2018-03-18 LAB
ALBUMIN UR-MCNC: NEGATIVE MG/DL
APPEARANCE UR: ABNORMAL
BACTERIA #/AREA URNS HPF: ABNORMAL /HPF
BILIRUB UR QL STRIP: NEGATIVE
COLOR UR AUTO: YELLOW
GLUCOSE UR STRIP-MCNC: NEGATIVE MG/DL
HGB UR QL STRIP: NEGATIVE
KETONES UR STRIP-MCNC: NEGATIVE MG/DL
LEUKOCYTE ESTERASE UR QL STRIP: ABNORMAL
MUCOUS THREADS #/AREA URNS LPF: PRESENT /LPF
NITRATE UR QL: POSITIVE
PH UR STRIP: 7 PH (ref 5–7)
RBC #/AREA URNS AUTO: 4 /HPF (ref 0–2)
SOURCE: ABNORMAL
SP GR UR STRIP: 1.01 (ref 1–1.03)
SQUAMOUS #/AREA URNS AUTO: 1 /HPF (ref 0–1)
TRANS CELLS #/AREA URNS HPF: 2 /HPF (ref 0–1)
UROBILINOGEN UR STRIP-MCNC: 0 MG/DL (ref 0–2)
WBC #/AREA URNS AUTO: 94 /HPF (ref 0–5)

## 2018-03-18 PROCEDURE — 87186 SC STD MICRODIL/AGAR DIL: CPT | Performed by: EMERGENCY MEDICINE

## 2018-03-18 PROCEDURE — A9270 NON-COVERED ITEM OR SERVICE: HCPCS | Mod: GY | Performed by: EMERGENCY MEDICINE

## 2018-03-18 PROCEDURE — 87086 URINE CULTURE/COLONY COUNT: CPT | Performed by: EMERGENCY MEDICINE

## 2018-03-18 PROCEDURE — 99284 EMERGENCY DEPT VISIT MOD MDM: CPT | Mod: 25

## 2018-03-18 PROCEDURE — 81001 URINALYSIS AUTO W/SCOPE: CPT | Performed by: EMERGENCY MEDICINE

## 2018-03-18 PROCEDURE — 76770 US EXAM ABDO BACK WALL COMP: CPT

## 2018-03-18 PROCEDURE — 25000132 ZZH RX MED GY IP 250 OP 250 PS 637: Mod: GY | Performed by: EMERGENCY MEDICINE

## 2018-03-18 PROCEDURE — 87088 URINE BACTERIA CULTURE: CPT | Performed by: EMERGENCY MEDICINE

## 2018-03-18 RX ORDER — SULFAMETHOXAZOLE/TRIMETHOPRIM 800-160 MG
1 TABLET ORAL 2 TIMES DAILY
Qty: 20 TABLET | Refills: 0 | Status: SHIPPED | OUTPATIENT
Start: 2018-03-18 | End: 2018-03-28

## 2018-03-18 RX ORDER — SULFAMETHOXAZOLE/TRIMETHOPRIM 800-160 MG
1 TABLET ORAL ONCE
Status: COMPLETED | OUTPATIENT
Start: 2018-03-18 | End: 2018-03-18

## 2018-03-18 RX ADMIN — SULFAMETHOXAZOLE AND TRIMETHOPRIM 1 TABLET: 800; 160 TABLET ORAL at 14:19

## 2018-03-18 ASSESSMENT — ENCOUNTER SYMPTOMS
BACK PAIN: 1
DYSURIA: 0
DIFFICULTY URINATING: 1
FEVER: 0
FREQUENCY: 1

## 2018-03-18 NOTE — ED PROVIDER NOTES
History     Chief Complaint:  Low back pain    HPI   Yasmin Larry is a 78 year old female who presents to the emergency department today for evaluation of low back pain. Of note, the patient has a history of right radical nephrectomy due to cancer. She was last seen in the emergency department on 3/2/2018 and was diagnosed with a UTI and was started on Keflex. After discharge, she was instructed to follow-up with her primary care doctor. She has an appointment with her primary care doctor in 2 days, but is unable to wait until then because she woke up this morning with chills and low back pain. She experienced frequent urination throughout the night last night with a total of 4-5 urination episodes. She reportedly had difficulty pushing the urine out but otherwise did not experience dysuria or abnormal odors. She states that these symptoms are very similar to her prior UTIs and presents to the emergency department to rule out a UTI. Her back pain is worse when she first wakes up but improves after she walks around. She does not have fever. She states that she has a history of low back pain as well due to arthritis. She recently came back from a cruise where she slept on a very uncomfortable mattress. She is currently not on any antibiotic or steroid treatment.     Allergies:  Drug allergies reviewed. No pertinent drug allergies.     Medications:    Calcium Citrate-Vitamin D (CALCIUM CITRATE + D PO)  Omega-3 Fatty Acids (FISH OIL PO)  Cyanocobalamin (B-12 DOTS PO)  Ascorbic Acid (VITAMIN C PO)  Acetaminophen (TYLENOL PO)  ASPIRIN PO  LEVOTHYROXINE SODIUM PO  NADOLOL PO  ATORVASTATIN CALCIUM PO  AMLODIPINE BESYLATE PO  LOSARTAN POTASSIUM PO  OMEPRAZOLE PO  traZODone (DESYREL) 50 MG tablet    Past Medical History:    Arthritis   Cerebral artery occlusion with cerebral infarction (H)   Hypertension   Hypothyroid   Other chronic pain   Sleep apnea    Past Surgical History:    Cholecystectomy   Cystoscopy, sling  transvaginal  Davinci Hysterectomy supracervical, sacrocolpopexy, combined  Davinci salpingo-oophorectomy including bilateral  Hernia repair   Laparoscopic lysis adhesions  Nephrectomy     Family History:    Family history reviewed. No pertinent family history.     Social History:  The patient was accompanied to the ED by .  Smoking Status: Never Smoker  Smokeless Tobacco: Never Used  Alcohol Use: Positive  Marital Status:       Review of Systems   Constitutional: Negative for fever.   Genitourinary: Positive for difficulty urinating and frequency. Negative for dysuria.   Musculoskeletal: Positive for back pain.   All other systems reviewed and are negative.    Physical Exam     Patient Vitals for the past 24 hrs:   BP Temp Temp src Pulse Heart Rate Resp SpO2   03/18/18 1215 187/76 97.8  F (36.6  C) Oral 60 60 16 99 %      Physical Exam  General: The patient is alert, in no respiratory distress.    HENT: Mucous membranes moist.    Cardiovascular: Regular rate and rhythm. Good pulses in all four extremities. Normal capillary refill and skin turgor.     Respiratory: Lungs are clear. No nasal flaring. No retractions. No wheezing, no crackles.    Gastrointestinal: Abdomen soft. No guarding, no rebound. No palpable hernias. No abdominal tenderness.    Musculoskeletal: No gross deformity. No mid-back tenderness.    Skin: No rashes or petechiae.     Neurologic: The patient is alert and oriented x3. GCS 15. No testable cranial nerve deficit. Follows commands with clear and appropriate speech. Gives appropriate answers. Good strength in all extremities. No gross neurologic deficit. Gross sensation intact. Pupils are round and reactive. No meningismus.     Lymphatic: No cervical adenopathy. No lower extremity swelling.    Psychiatric: The patient is non-tearful.    Emergency Department Course     Imaging:  Radiology findings were communicated with the patient who voiced understanding of the  findings.    Retroperitoneal US  IMPRESSION: Right nephrectomy. No left hydronephrosis.    Laboratory:  Laboratory findings were communicated with the patient who voiced understanding of the findings.    UA with Microscopic: Nitrite Positive (A), Leukocyte Esterase Large (A), WBC/HPF 94 (H), RBC/HPF 4 (H), Bacteria Few (A), Transitional Epi 2 (H), Mucous Present (A) o/w WNL  Urine culture: Pending     Interventions:  1419 Bactrim -160 mg 1 tablet PO    Emergency Department Course:    Nursing notes and vitals reviewed.    1225 I performed an exam of the patient as documented above.     The patient provided a urine sample here in the emergency department. This was sent for laboratory testing, findings above.      The patient was sent for a retroperitoneal US while in the emergency department, results above.     1355 I discussed the lab and imaging results with the patient. We also discussed Bactrim versus Cipro treatment. The patient prefers Bactrim.      I discussed the treatment plan with the patient. They expressed understanding of this plan and consented to discharge. They will be discharged home with instructions for care and follow up. In addition, the patient will return to the emergency department if their symptoms persist, worsen, if new symptoms arise or if there is any concern.  All questions were answered.     Impression & Plan      Medical Decision Making:  Yasmin Larry is a 78 year old female who presents to the emergency department today for evaluation of back pain. The patient has had a history of multiple UTIs and in fact had a UTI earlier this month, which was E. coli and was essentially pan-sensitive. She reports she got better with those symptoms and was actually able to go on a cruise, but on returning she had abdominal pain. The fact that the patient has had E. coli as the cause of her UTI previously and this was pan-sensitive, I did discuss putting her on Bactrim versus Cipro. The  concern is the patient does not respond to the antibiotic and she has only one kidney. I did have her ultrasound her kidney and it looks quite good at this point. The patient did elect to go with a narrow spectrum antibiotic and agrees to return if she develops fevers, worsening pain, or other symptoms. She was discharged home in good condition and she is scheduled to follow-up in 2 days anyway.     Diagnosis:    ICD-10-CM    1. UTI (urinary tract infection) with pyuria N39.0    2. Midline low back pain without sciatica, unspecified chronicity M54.5    3. History of nephrectomy Z90.5      Disposition:   The patient is discharged to home.     Discharge Medications:  Discharge Medication List as of 3/18/2018  2:11 PM      START taking these medications    Details   sulfamethoxazole-trimethoprim (BACTRIM DS) 800-160 MG per tablet Take 1 tablet by mouth 2 times daily for 10 days, Disp-20 tablet, R-0, Local Print           Scribe Disclosure:  I, Mei Aldrich, am serving as a scribe at 12:20 PM on 3/18/2018 to document services personally performed by Silviano Khalil MD, based on my observations and the provider's statements to me.      [facility] EMERGENCY DEPARTMENT       Silviano Khalil MD  03/20/18 0563

## 2018-03-18 NOTE — ED NOTES
MD in to see patient and discuss diagnosis, test results, and discharge plan. Patient meets discharge criteria. Discussed AVS with patient. Questions answered. Patient verbalized understanding. Patient reports being ready to go home. Patient discharged home with spouse by car with all necessary information.

## 2018-03-18 NOTE — ED NOTES
Pt presents with urinary frequency, chills, flank pain and difficulty sleeping, concerned for UTI, previous hx of UTI. Denies hematuria or dysuria. Pt alert, oriented x3. ABCs intact

## 2018-03-18 NOTE — ED AVS SNAPSHOT
[facility] Emergency Department    [address] [place] [address]    Phone:  [phone]    Fax:  [phone]                                       Yasmin Larry   MRN: [id number]    Department:  [facility] Emergency Department   Date of Visit:  3/18/2018           Patient Information     Date Of Birth          1940        Your diagnoses for this visit were:     UTI (urinary tract infection) with pyuria     Midline low back pain without sciatica, unspecified chronicity     History of nephrectomy        You were seen by Silviano Khalil MD.      Follow-up Information     Follow up with Nae Looney Schedule an appointment as soon as possible for a visit in 2 days.    Specialty:  Internal Medicine    Contact information:    [facility]  [address]  [facility] [address]  [phone]          Discharge Instructions       Discharge Instructions  Urinary Tract Infection  You have urinary tract infection, or UTI. The urinary tract includes the kidneys (which make urine), ureters (the tubes that carry urine from the kidneys to the bladder), the bladder (which stores urine), and urethra (the tube that carries urine out of the bladder).  Urinary tract infections occur when bacteria travel up the urethra into the bladder. We suspect a UTI based on chemical and microscopic findings in your urine, but if there is a question about your findings, we will do a culture to see if bacteria grow. A urine culture takes several days. You should always follow-up with your primary physician to find out about results of your culture if one was done.   Return to the Emergency Department if:    You have severe back pain.    You are vomiting so that you can t take your medicine, or have signs of dehydration (such as urinating less than 3 times per day).    You have fever over 101.5 degrees F.    You have significant confusion or are very weak, or feel very  ill.    Your child seems much more ill, won t wake up, won t respond right, or is crying for a long time and won t calm down.    Your child is showing signs of dehydration, Signs of dehydration can be:  o Your infant has had no wet diapers in 4-5 hours.  o Your older child has not passed urine in 6-8 hours.  o Your infant or child starts to have dry mouth and lips, or no saliva or tears.    Follow-up with your doctor:     Children under 24 months need to be seen by their regular doctor within one week after a diagnosis of a UTI. It may be necessary to do some imaging tests to look at the child s kidney or bladder.    You should begin to feel better within 24 - 48 hours of starting your antibiotic.  If you do not, you need to be seen again.      Treatment:     You will be treated with an antibiotic to kill the bacteria. We have to make an educated guess as to which antibiotic will work for your infection. In most healthy people, we can guess right almost all of the time. Sometimes a culture is done to show which antibiotics will work. This usually takes 2-3 days. When the culture is done, we may have to contact you to put you on a different antibiotic.    Take a pain medication such as Tylenol  (acetaminophen), Advil  (ibuprofen), Nuprin  (ibuprofen), or Aleve  (naproxen). If you have been given a narcotic such as Vicodin  (hydrocodone with acetaminophen), Percocet  (oxycodone with acetaminophen), or codeine, do not drive for four hours after you have taken it. If the narcotic contains Tylenol  (acetaminophen), do not take Tylenol  with it. All narcotics will cause constipation, so eat a high fiber diet.      Pyridium  (phenazopyridine) or Uristat  (phenazopyridine) is a prescription medication that numbs the bladder to reduce the burning pain of some UTIs.  The same medication is available in a non-prescription version called Azo-Standard  (phenazopyridine), Urodol  (phenazopyridine), or other brand names. This  "medication will change the color of the urine and tears (usually blue or orange). If you wear contacts, do not wear them while taking this medication as they may be stained by the medication.    Antibiotic Warning:     If you have been placed on antibiotics - watch for signs of allergic reaction.  These include rash, lip swelling, difficulty breathing, wheezing, and dizziness.  If you develop any of these symptoms, stop the antibiotic immediately and go to an emergency room or urgent care for evaluation.    Probiotics: If you have been given an antibiotic, you may want to also take a probiotic pill or eat yogurt with live cultures. Probiotics have \"good bacteria\" to help your intestines stay healthy. Studies have shown that probiotics help prevent diarrhea and other intestine problems (including C. diff infection) when you take antibiotics. You can buy these without a prescription in the pharmacy section of the store.   If you were given a prescription for medicine here today, be sure to read all of the information (including the package insert) that comes with your prescription.  This will include important information about the medicine, its side effects, and any warnings that you need to know about.  The pharmacist who fills the prescription can provide more information and answer questions you may have about the medicine.  If you have questions or concerns that the pharmacist cannot address, please call or return to the Emergency Department.   Opioid Medication Information    Pain medications are among the most commonly prescribed medicines, so we are including this information for all our patients. If you did not receive pain medication or get a prescription for pain medicine, you can ignore it.     You may have been given a prescription for an opioid (narcotic) pain medicine and/or have received a pain medicine while here in the Emergency Department. These medicines can make you drowsy or impaired. You must " not drive, operate dangerous equipment, or engage in any other dangerous activities while taking these medications. If you drive while taking these medications, you could be arrested for DUI, or driving under the influence. Do not drink any alcohol while you are taking these medications.     Opioid pain medications can cause addiction. If you have a history of chemical dependency of any type, you are at a higher risk of becoming addicted to pain medications.  Only take these prescribed medications to treat your pain when all other options have been tried. Take it for as short a time and as few doses as possible. Store your pain pills in a secure place, as they are frequently stolen and provide a dangerous opportunity for children or visitors in your house to start abusing these powerful medications. We will not replace any lost or stolen medicine.  As soon as your pain is better, you should flush all your remaining medication.     Many prescription pain medications contain Tylenol  (acetaminophen), including Vicodin , Tylenol #3 , Norco , Lortab , and Percocet .  You should not take any extra pills of Tylenol  if you are using these prescription medications or you can get very sick.  Do not ever take more than 3000 mg of acetaminophen in any 24 hour period.    All opioids tend to cause constipation. Drink plenty of water and eat foods that have a lot of fiber, such as fruits, vegetables, prune juice, apple juice and high fiber cereal.  Take a laxative if you don t move your bowels at least every other day. Miralax , Milk of Magnesia, Colace , or Senna  can be used to keep you regular.      Remember that you can always come back to the Emergency Department if you are not able to see your regular doctor in the amount of time listed above, if you get any new symptoms, or if there is anything that worries you.        24 Hour Appointment Hotline       To make an appointment at any [facility], call [phone]  ([phone]). If you don't have a family doctor or clinic, we will help you find one. [facility] are conveniently located to serve the needs of you and your family.             Review of your medicines      START taking        Dose / Directions Last dose taken    sulfamethoxazole-trimethoprim 800-160 MG per tablet   Commonly known as:  BACTRIM DS   Dose:  1 tablet   Quantity:  20 tablet        Take 1 tablet by mouth 2 times daily for 10 days   Refills:  0          Our records show that you are taking the medicines listed below. If these are incorrect, please call your family doctor or clinic.        Dose / Directions Last dose taken    AMLODIPINE BESYLATE PO   Dose:  5 mg        Take 5 mg by mouth At Bedtime   Refills:  0        ASPIRIN PO   Dose:  81 mg        Take 81 mg by mouth At Bedtime   Refills:  0        ATORVASTATIN CALCIUM PO   Dose:  20 mg        Take 20 mg by mouth At Bedtime   Refills:  0        B-12 DOTS PO   Dose:  1 capsule        Take 1 capsule by mouth daily   Refills:  0        CALCIUM CITRATE + D PO   Dose:  1 tablet        Take 1 tablet by mouth 2 times daily   Refills:  0        FISH OIL PO   Dose:  1 g        Take 1 g by mouth daily   Refills:  0        LEVOTHYROXINE SODIUM PO   Dose:  88 mcg        Take 88 mcg by mouth every morning   Refills:  0        LOSARTAN POTASSIUM PO   Dose:  50 mg        Take 50 mg by mouth every morning   Refills:  0        NADOLOL PO   Dose:  10 mg        Take 10 mg by mouth every morning (0.5 x 20 mg tablet = 10 mg dose)   Refills:  0        NATURAL EMU RELIEF EX        Externally apply topically nightly as needed (Pain to Leg)   Refills:  0        OMEPRAZOLE PO   Dose:  20 mg        Take 20 mg by mouth every other day   Refills:  0        traZODone 50 MG tablet   Commonly known as:  DESYREL   Dose:  50 mg        Take 50 mg by mouth At Bedtime.   Refills:  0        TYLENOL PO   Dose:  500-1000 mg        Take 500-1,000 mg by mouth 3 times daily as needed  for mild pain or fever   Refills:  0        VITAMIN C PO   Dose:  1 tablet        Take 1 tablet by mouth daily   Refills:  0                Prescriptions were sent or printed at these locations (1 Prescription)                   Other Prescriptions                Printed at Department/Unit printer (1 of 1)         sulfamethoxazole-trimethoprim (BACTRIM DS) 800-160 MG per tablet                Procedures and tests performed during your visit     Retroperitoneal US    UA with Microscopic    Urine Culture      Orders Needing Specimen Collection     None      Pending Results     Date and Time Order Name Status Description    3/18/2018 1236 Retroperitoneal US Preliminary     3/18/2018 1236 Urine Culture In process             Pending Culture Results     Date and Time Order Name Status Description    3/18/2018 1236 Urine Culture In process             Pending Results Instructions     If you had any lab results that were not finalized at the time of your Discharge, you can call the ED Lab Result RN at [phone]. You will be contacted by this team for any positive Lab results or changes in treatment. The nurses are available 7 days a week from 10A to 6:30P.  You can leave a message 24 hours per day and they will return your call.        Test Results From Your Hospital Stay        3/18/2018  1:09 PM      Component Results     Component Value Ref Range & Units Status    Color Urine Yellow  Final    Appearance Urine Slightly Cloudy  Final    Glucose Urine Negative NEG^Negative mg/dL Final    Bilirubin Urine Negative NEG^Negative Final    Ketones Urine Negative NEG^Negative mg/dL Final    Specific Gravity Urine 1.013 1.003 - 1.035 Final    Blood Urine Negative NEG^Negative Final    pH Urine 7.0 5.0 - 7.0 pH Final    Protein Albumin Urine Negative NEG^Negative mg/dL Final    Urobilinogen mg/dL 0.0 0.0 - 2.0 mg/dL Final    Nitrite Urine Positive (A) NEG^Negative Final    Leukocyte Esterase Urine Large (A) NEG^Negative Final     Source Midstream Urine  Final    WBC Urine 94 (H) 0 - 5 /HPF Final    RBC Urine 4 (H) 0 - 2 /HPF Final    Bacteria Urine Few (A) NEG^Negative /HPF Final    Squamous Epithelial /HPF Urine 1 0 - 1 /HPF Final    Transitional Epi 2 (H) 0 - 1 /HPF Final    Mucous Urine Present (A) NEG^Negative /LPF Final         3/18/2018  1:01 PM         3/18/2018  1:36 PM      Narrative     US RENAL COMPLETE 3/18/2018 1:30 PM     HISTORY: Evaluate left kidney, history of right nephrectomy, now with  increased back pain and URI symptoms. Evaluate lone kidney for  obstruction.    FINDINGS: The right kidney is not visible and reported to be  surgically absent. The left kidney appears within normal limits  measuring 10.4 cm in length with a cortical thickness of 1.5 cm. No  left renal mass, calcification, or hydronephrosis is demonstrated. No  abnormality is seen within the region of the bladder trigone.        Impression     IMPRESSION: Right nephrectomy. No left hydronephrosis.                Clinical Quality Measure: Blood Pressure Screening     Your blood pressure was checked while you were in the emergency department today. The last reading we obtained was  BP: 187/76 . Please read the guidelines below about what these numbers mean and what you should do about them.  If your systolic blood pressure (the top number) is less than 120 and your diastolic blood pressure (the bottom number) is less than 80, then your blood pressure is normal. There is nothing more that you need to do about it.  If your systolic blood pressure (the top number) is 120-139 or your diastolic blood pressure (the bottom number) is 80-89, your blood pressure may be higher than it should be. You should have your blood pressure rechecked within a year by a primary care provider.  If your systolic blood pressure (the top number) is 140 or greater or your diastolic blood pressure (the bottom number) is 90 or greater, you may have high blood pressure. High blood  "pressure is treatable, but if left untreated over time it can put you at risk for heart attack, stroke, or kidney failure. You should have your blood pressure rechecked by a primary care provider within the next 4 weeks.  If your provider in the emergency department today gave you specific instructions to follow-up with your doctor or provider even sooner than that, you should follow that instruction and not wait for up to 4 weeks for your follow-up visit.        Thank you for choosing [facility]       Thank you for choosing [facility] for your care. Our goal is always to provide you with excellent care. Hearing back from our patients is one way we can continue to improve our services. Please take a few minutes to complete the written survey that you may receive in the mail after you visit with us. Thank you!        [employer] Information     [employer] lets you send messages to your doctor, view your test results, renew your prescriptions, schedule appointments and more. To sign up, go to [email or web address] . Click on \"Log in\" on the left side of the screen, which will take you to the Welcome page. Then click on \"Sign up Now\" on the right side of the page.     You will be asked to enter the access code listed below, as well as some personal information. Please follow the directions to create your username and password.     Your access code is: [email or web address]  Expires: 2018 11:27 AM     Your access code will  in 90 days. If you need help or a new code, please call your [facility] clinic or [phone].        Care EveryWhere ID     This is your Care EveryWhere ID. This could be used by other organizations to access your [facility] medical records  [id number]        Equal Access to Services     [facility] : Alisha Campbell, priti mcgovern, luis loo. So [facility] [phone].    ATENCIÓN: Si habla español, tiene a garica disposición servicios " elsi de asistencia lingüística. Gabi christensen [phone].    We comply with applicable federal civil rights laws and [place] laws. We do not discriminate on the basis of race, color, national origin, age, disability, sex, sexual orientation, or gender identity.            After Visit Summary       This is your record. Keep this with you and show to your community pharmacist(s) and doctor(s) at your next visit.

## 2018-03-18 NOTE — ED AVS SNAPSHOT
[facility] Emergency Department    [address]    [facility] [address]    Phone:  [phone]    Fax:  [phone]                                       Yasmin Larry   MRN: [id number]    Department:  [facility] Emergency Department   Date of Visit:  3/18/2018           After Visit Summary Signature Page     I have received my discharge instructions, and my questions have been answered. I have discussed any challenges I see with this plan with the nurse or doctor.    ..........................................................................................................................................  Patient/Patient Representative Signature      ..........................................................................................................................................  Patient Representative Print Name and Relationship to Patient    ..................................................               ................................................  Date                                            Time    ..........................................................................................................................................  Reviewed by Signature/Title    ...................................................              ..............................................  Date                                                            Time

## 2018-03-19 LAB
BACTERIA SPEC CULT: ABNORMAL
Lab: ABNORMAL
SPECIMEN SOURCE: ABNORMAL

## 2018-03-31 NOTE — ANESTHESIA POSTPROCEDURE EVALUATION
Patient: Yasmin Larry    Procedure(s):  DAVINCI XI SUPRACERVICAL HYSTERECTOMY, BILATERAL SALPINGO-OOPHORECTOMY, SACROCOLPOPEXY, TRANSVAGINAL SLING, CYSTOSCOPY (DR. ENGLISH);  DAVINCI XI VENTRAL RECTOPEXY, LAPAROSCOPIC EXTENSIVE LYSIS OF ADHESIONS (DR. NICOLE)  - Wound Class: II-Clean Contaminated   - Wound Class: II-Clean Contaminated   - Wound Class: I-Clean   - Wound Class: II-Clean Contaminated   - Wound Class: I-Clean    Diagnosis:SECOND DEGREE PROLAPSE AND INCONTIENCE, RECTOPEXY   Diagnosis Additional Information: No value filed.    Anesthesia Type:  General, ETT    Note:  Anesthesia Post Evaluation    Patient location during evaluation: PACU  Patient participation: Able to fully participate in evaluation  Level of consciousness: awake, awake and alert and responsive to verbal stimuli  Pain management: adequate  Airway patency: patent  Cardiovascular status: acceptable  Respiratory status: acceptable  Hydration status: acceptable  PONV: none     Anesthetic complications: None          Last vitals:  Vitals:    01/03/18 1440 01/03/18 1450 01/03/18 1515   BP: 140/54 122/58 154/67   Resp: 10 19 14   Temp:  36.3  C (97.4  F) 36.3  C (97.4  F)   SpO2: 98% 97% 96%         Electronically Signed By: Nae Daigle  January 3, 2018  3:40 PM   spouse

## 2018-04-02 ENCOUNTER — HOSPITAL ENCOUNTER (EMERGENCY)
Facility: CLINIC | Age: 78
Discharge: HOME | End: 2018-04-02
Attending: EMERGENCY MEDICINE | Admitting: EMERGENCY MEDICINE
Payer: MEDICARE

## 2018-04-02 VITALS
DIASTOLIC BLOOD PRESSURE: 81 MMHG | SYSTOLIC BLOOD PRESSURE: 142 MMHG | RESPIRATION RATE: 16 BRPM | OXYGEN SATURATION: 97 % | TEMPERATURE: 97.8 F

## 2018-04-02 DIAGNOSIS — R30.0 DYSURIA: ICD-10-CM

## 2018-04-02 LAB
ALBUMIN UR-MCNC: NEGATIVE MG/DL
APPEARANCE UR: CLEAR
BACTERIA #/AREA URNS HPF: ABNORMAL /HPF
BILIRUB UR QL STRIP: NEGATIVE
COLOR UR AUTO: ABNORMAL
GLUCOSE UR STRIP-MCNC: NEGATIVE MG/DL
HGB UR QL STRIP: NEGATIVE
HYALINE CASTS #/AREA URNS LPF: 1 /LPF (ref 0–2)
KETONES UR STRIP-MCNC: NEGATIVE MG/DL
LEUKOCYTE ESTERASE UR QL STRIP: NEGATIVE
NITRATE UR QL: NEGATIVE
PH UR STRIP: 6 PH (ref 5–7)
RBC #/AREA URNS AUTO: 1 /HPF (ref 0–2)
SOURCE: ABNORMAL
SP GR UR STRIP: 1.01 (ref 1–1.03)
UROBILINOGEN UR STRIP-MCNC: 0 MG/DL (ref 0–2)
WBC #/AREA URNS AUTO: <1 /HPF (ref 0–5)

## 2018-04-02 PROCEDURE — 81001 URINALYSIS AUTO W/SCOPE: CPT | Performed by: EMERGENCY MEDICINE

## 2018-04-02 PROCEDURE — 87086 URINE CULTURE/COLONY COUNT: CPT | Performed by: EMERGENCY MEDICINE

## 2018-04-02 PROCEDURE — 99283 EMERGENCY DEPT VISIT LOW MDM: CPT

## 2018-04-02 ASSESSMENT — ENCOUNTER SYMPTOMS
BACK PAIN: 0
DYSURIA: 1
FEVER: 0

## 2018-04-02 NOTE — ED PROVIDER NOTES
History     Chief Complaint:  Dysuria    HPI   Yasmin Larry is a 78 year old female with a history of cerebral infarction, hypertension, frequent UTIs, and nephrectomy who presents to the emergency department today for evaluation of dysuria. Per chart review, the patient has been seen in the emergency department on 3/2 and 3/18 for similar dysuria, frequency, difficulty urinating, and urgency. The first time, she was diagnosed with a UTI and was started of Keflex. On 3/18, she came back after her antibiotic regimen and had another UTI and was put on bactrim. Recently, she reports she just finished her bactrim regimen for the previous UTI and feels as though she may have another. She notes an onset of dysuria and vaginal itching since this morning. Her symptoms feel very similar to her previous UTI symptoms prompting her visit to the emergency department. At arrival, she notes when laying down, her symptoms are not present. She denies back pain, vaginal discharge, and fever. Of note, she has an appointment for follow up with [place] [facility] tomorrow.     Allergies:  No Known Drug Allergies    Medications:    Calcium Citrate-Vitamin D (CALCIUM CITRATE + D PO)  Omega-3 Fatty Acids (FISH OIL PO)  Cyanocobalamin (B-12 DOTS PO)  Ascorbic Acid (VITAMIN C PO)  Acetaminophen (TYLENOL PO)  Misc Natural Products (NATURAL EMU RELIEF EX)  ASPIRIN PO  LEVOTHYROXINE SODIUM PO  NADOLOL PO  ATORVASTATIN CALCIUM PO  AMLODIPINE BESYLATE PO  LOSARTAN POTASSIUM PO  OMEPRAZOLE PO  traZODone (DESYREL) 50 MG tablet    Past Medical History:    Arthritis   Cerebral artery occlusion with cerebral infarction (H)   Hypertension   Hypothyroid   Other chronic pain   PONV (postoperative nausea and vomiting)   Sleep apnea    Past Surgical History:    CHOLECYSTECTOMY     CYSTOSCOPY, SLING TRANSVAGINAL   DAVINCI HYSTERECTOMY SUPRACERVICAL, SACROCOLPOPEXY, COMBINED   DAVINCI RECTOPEXY   DAVINCI SALPINGO-OOPHORECTOMY INCLUDING BILATERAL   HERNIA  REPAIR- ventral hernia  LAPAROSCOPIC LYSIS ADHESIONS  NEPHRECTOMY     Family History:    History reviewed. No pertinent family history.     Social History:  The patient was accompanied to the ED by .  Smoking Status: Never  Smokeless Tobacco: Never  Alcohol Use: Yes  Marital Status:       Review of Systems   Constitutional: Negative for fever.   Genitourinary: Positive for dysuria and vaginal pain. Negative for vaginal discharge.        Vaginal itching   Musculoskeletal: Negative for back pain.   All other systems reviewed and are negative.    Physical Exam     Patient Vitals for the past 24 hrs:   BP Temp Temp src Heart Rate Resp SpO2   04/02/18 1315 144/64 - - - - 98 %   04/02/18 1300 (!) 127/114 - - - - 98 %   04/02/18 1245 142/67 - - - - 98 %   04/02/18 1230 - - - - - 100 %   04/02/18 1212 163/80 97.8  F (36.6  C) Temporal 63 16 99 %       Physical Exam  Constitutional:  Appears well-developed and well-nourished. Alert. Conversant. Non toxic.  HENT:   Head: Atraumatic.   Nose: Nose normal.  Mouth/Throat: Oral mucosa is clear and moist. no trismus. Pharynx normal. Tonsils symmetric. No tonsillar enlargement, erythema, or exudate.  Eyes: Conjunctivae normal. EOM normal. Pupils equal, round, and reactive to light. No scleral icterus.   Neck: Normal range of motion. Neck supple. No tracheal deviation present.   Cardiovascular: Normal rate, regular rhythm. No gallop. No friction rub. No murmur heard. Symmetric radial artery pulses   Pulmonary/Chest: Effort normal. No stridor. No respiratory distress. No wheezes. No rales. No rhonchi . No tenderness.   Abdominal: Soft. Bowel sounds normal. No distension. No mass. No tenderness. No rebound. No guarding. No CVA tenderness  Pelvic: deferred  Musculoskeletal:   RUE: Normal range of motion. No tenderness. No deformity  LUE: Normal range of motion. No tenderness. No deformity  RLE: Normal range of motion. No edema. No tenderness. No deformity  LLE: Normal  range of motion. No edema. No tenderness. No deformity  Neurological: Alert and oriented to person, place, and time. Normal strength. CN II-VII intact. No sensory deficit. GCS eye subscore is 4. GCS verbal subscore is 5. GCS motor subscore is 6. Normal coordination   Skin: Skin is warm and dry. No rash noted. No pallor. Normal capillary refill.  Psychiatric:  Normal mood. Normal affect.       Emergency Department Course   Laboratory:  Laboratory findings were communicated with the patient and family who voiced understanding of the findings.  UA: clear, straw urine, bacteria few o/w WNL    Urine Culture Aerobic Bacterial: Pending     Emergency Department Course:  Nursing notes and vitals reviewed.  1345: I performed an exam of the patient as documented above.   Findings and plan explained to the Patient and spouse. Patient discharged home with instructions regarding supportive care, medications, and reasons to return. The importance of close follow-up was reviewed.  I personally reviewed the laboratory results with the Patient and spouse and answered all related questions prior to discharge.    Impression & Plan    Medical Decision Making:  Yasmin Larry is a 78 year old female who is status post recent hysterectomy with rectocele repair and apparent pelvic floor repair as well.  She has had recurrent UTIs over the past several months, most recently that diagnosed a few weeks ago and just completed a course of antibiotics a few days ago.  She has been referred to a urologist and has an appointment to see them tomorrow for further evaluation of why she is getting so many urinary tract infections.  She came back to the ER today because she redeveloped some dysuria yesterday and last night.  By history of a suspicious that what she would in fact have a recurrent episode of cystitis.  No historical or exam features here to suggest urosepsis or pyelonephritis.  However urinalysis is actually normal.  Discussed that  this does not indicate the presence of a UTI but we will send a urine culture, and await results of that prior to initiation of antibiotics.  Patient is agreeable to this plan.  Differential here included vaginitis or yeast infection, there was some report that she was having vaginal itching from the nurses, but to me she says she is not having any vaginal itching, discharge, bleeding or other vaginal symptoms.  She would rather follow-up with her gynecologist for recheck tomorrow then have any further workup here in the ER today.  I think that is reasonable, and patient certainly has medical decision-making capacity with that regard.  Precautions for return reviewed.    Diagnosis:    ICD-10-CM    1. Dysuria R30.0        Disposition:  discharged to home     Scribe Disclosure:  Manuel FOSTER, am serving as a scribe at 12:20 PM on 4/2/2018 to document services personally performed by Daniel Colmenares MD based on my observations and the provider's statements to me.     4/2/2018   [facility] EMERGENCY DEPARTMENT       Daniel Colmenares MD  04/05/18 0853

## 2018-04-02 NOTE — ED AVS SNAPSHOT
[facility] Emergency Department    [address]    [facility] [address]    Phone:  [phone]    Fax:  [phone]                                       Yasmin Larry   MRN: [id number]    Department:  [facility] Emergency Department   Date of Visit:  4/2/2018           After Visit Summary Signature Page     I have received my discharge instructions, and my questions have been answered. I have discussed any challenges I see with this plan with the nurse or doctor.    ..........................................................................................................................................  Patient/Patient Representative Signature      ..........................................................................................................................................  Patient Representative Print Name and Relationship to Patient    ..................................................               ................................................  Date                                            Time    ..........................................................................................................................................  Reviewed by Signature/Title    ...................................................              ..............................................  Date                                                            Time

## 2018-04-02 NOTE — DISCHARGE INSTRUCTIONS

## 2018-04-02 NOTE — ED NOTES
"Has an appointment with urology tomorrow. Just got through taking an antibiotic for UTI and feels she may have another. States burning and itching \"vaginally\".  "

## 2018-04-02 NOTE — ED AVS SNAPSHOT
" [facility] Emergency Department    [address] [place] [address]    Phone:  [phone]    Fax:  [phone]                                       Yasmin Larry   MRN: [id number]    Department:  [facility] Emergency Department   Date of Visit:  4/2/2018           Patient Information     Date Of Birth          1940        Your diagnoses for this visit were:     Dysuria        You were seen by Daniel Colmenares MD.      Follow-up Information     Please follow up.    Why:  Please recheck with your urologist tomorrow.  Return to the ER right away if you have any concerning symptoms overnight.        Discharge Instructions         Dysuria     Painful urination (dysuria) is often caused by a problem in the urinary tract.   Dysuria is pain felt during urination. It is often described as a burning. Learn more about this problem and how it can be treated.  What causes dysuria?  Possible causes include:    Infection with a bacteria or virus such as a urinary tract infection (UTI or a sexually transmitted infection (STI)    Sensitivity or allergy to chemicals such as those found in lotions and other products    Prostate or bladder problems    Radiation therapy to the pelvic area  How is dysuria diagnosed?  Your healthcare provider will examine you. He or she will ask about your symptoms and health. After talking with you and doing a physical exam, your healthcare provider may know what is causing your dysuria. He or she will usually request  a sample of your urine. Tests of your urine, or a \"urinalysis,\" are done. A urinalysis may include:    Looking at the urine sample (visual exam)    Checking for substances (chemical exam)    Looking at a small amount under a microscope (microscopic exam)  Some parts of the urinalysis may be done in the provider's office and some in a lab. And, the urine sample may be checked for bacteria and yeast (urine culture). Your " healthcare provider will tell you more about these tests if they are needed.  How is dysuria treated?  Treatment depends on the cause. If you have a bacterial infection, you may need antibiotics. You may be given medicines to make it easier for you to urinate and help relieve pain. Your healthcare provider can tell you more about your treatment options. Untreated, symptoms may get worse.  When to call your healthcare provider  Call the healthcare provider right away if you have any of the following:    Fever of 100.4 F (38 C) or higher     No improvement after three days of treatment    Trouble urinating because of pain    New or increased discharge from the vagina or penis    Rash or joint pain    Increased back or abdominal pain    Enlarged painful lymph nodes (lumps) in the groin   Date Last Reviewed: 1/1/2017 2000-2017 [employer]. [address] [place] [address]. All rights reserved. This information is not intended as a substitute for professional medical care. Always follow your healthcare professional's instructions.          24 Hour Appointment Hotline       To make an appointment at any [facility], call [phone] ([phone]). If you don't have a family doctor or clinic, we will help you find one. [facility] clinics are conveniently located to serve the needs of you and your family.             Review of your medicines      Our records show that you are taking the medicines listed below. If these are incorrect, please call your family doctor or clinic.        Dose / Directions Last dose taken    AMLODIPINE BESYLATE PO   Dose:  5 mg        Take 5 mg by mouth At Bedtime   Refills:  0        ASPIRIN PO   Dose:  81 mg        Take 81 mg by mouth At Bedtime   Refills:  0        ATORVASTATIN CALCIUM PO   Dose:  20 mg        Take 20 mg by mouth At Bedtime   Refills:  0        B-12 DOTS PO   Dose:  1 capsule        Take 1 capsule by mouth daily   Refills:  0        CALCIUM  CITRATE + D PO   Dose:  1 tablet        Take 1 tablet by mouth 2 times daily   Refills:  0        FISH OIL PO   Dose:  1 g        Take 1 g by mouth daily   Refills:  0        LEVOTHYROXINE SODIUM PO   Dose:  88 mcg        Take 88 mcg by mouth every morning   Refills:  0        LOSARTAN POTASSIUM PO   Dose:  50 mg        Take 50 mg by mouth every morning   Refills:  0        NADOLOL PO   Dose:  10 mg        Take 10 mg by mouth every morning (0.5 x 20 mg tablet = 10 mg dose)   Refills:  0        NATURAL EMU RELIEF EX        Externally apply topically nightly as needed (Pain to Leg)   Refills:  0        OMEPRAZOLE PO   Dose:  20 mg        Take 20 mg by mouth every other day   Refills:  0        traZODone 50 MG tablet   Commonly known as:  DESYREL   Dose:  50 mg        Take 50 mg by mouth At Bedtime.   Refills:  0        TYLENOL PO   Dose:  500-1000 mg        Take 500-1,000 mg by mouth 3 times daily as needed for mild pain or fever   Refills:  0        VITAMIN C PO   Dose:  1 tablet        Take 1 tablet by mouth daily   Refills:  0                Procedures and tests performed during your visit     UA with Microscopic    Urine Culture      Orders Needing Specimen Collection     None      Pending Results     Date and Time Order Name Status Description    4/2/2018 1223 Urine Culture In process             Pending Culture Results     Date and Time Order Name Status Description    4/2/2018 1223 Urine Culture In process             Pending Results Instructions     If you had any lab results that were not finalized at the time of your Discharge, you can call the ED Lab Result RN at [phone]. You will be contacted by this team for any positive Lab results or changes in treatment. The nurses are available 7 days a week from 10A to 6:30P.  You can leave a message 24 hours per day and they will return your call.        Test Results From Your Hospital Stay        4/2/2018 12:48 PM      Component Results     Component Value Ref  Range & Units Status    Color Urine Straw  Final    Appearance Urine Clear  Final    Glucose Urine Negative NEG^Negative mg/dL Final    Bilirubin Urine Negative NEG^Negative Final    Ketones Urine Negative NEG^Negative mg/dL Final    Specific Gravity Urine 1.009 1.003 - 1.035 Final    Blood Urine Negative NEG^Negative Final    pH Urine 6.0 5.0 - 7.0 pH Final    Protein Albumin Urine Negative NEG^Negative mg/dL Final    Urobilinogen mg/dL 0.0 0.0 - 2.0 mg/dL Final    Nitrite Urine Negative NEG^Negative Final    Leukocyte Esterase Urine Negative NEG^Negative Final    Source Midstream Urine  Final    WBC Urine <1 0 - 5 /HPF Final    RBC Urine 1 0 - 2 /HPF Final    Bacteria Urine Few (A) NEG^Negative /HPF Final    Hyaline Casts 1 0 - 2 /LPF Final         4/2/2018 12:40 PM                Clinical Quality Measure: Blood Pressure Screening     Your blood pressure was checked while you were in the emergency department today. The last reading we obtained was  BP: 144/64 . Please read the guidelines below about what these numbers mean and what you should do about them.  If your systolic blood pressure (the top number) is less than 120 and your diastolic blood pressure (the bottom number) is less than 80, then your blood pressure is normal. There is nothing more that you need to do about it.  If your systolic blood pressure (the top number) is 120-139 or your diastolic blood pressure (the bottom number) is 80-89, your blood pressure may be higher than it should be. You should have your blood pressure rechecked within a year by a primary care provider.  If your systolic blood pressure (the top number) is 140 or greater or your diastolic blood pressure (the bottom number) is 90 or greater, you may have high blood pressure. High blood pressure is treatable, but if left untreated over time it can put you at risk for heart attack, stroke, or kidney failure. You should have your blood pressure rechecked by a primary care provider  "within the next 4 weeks.  If your provider in the emergency department today gave you specific instructions to follow-up with your doctor or provider even sooner than that, you should follow that instruction and not wait for up to 4 weeks for your follow-up visit.        Thank you for choosing [facility]       Thank you for choosing [facility] for your care. Our goal is always to provide you with excellent care. Hearing back from our patients is one way we can continue to improve our services. Please take a few minutes to complete the written survey that you may receive in the mail after you visit with us. Thank you!        [employer] Information     [employer] lets you send messages to your doctor, view your test results, renew your prescriptions, schedule appointments and more. To sign up, go to [email or web address] . Click on \"Log in\" on the left side of the screen, which will take you to the Welcome page. Then click on \"Sign up Now\" on the right side of the page.     You will be asked to enter the access code listed below, as well as some personal information. Please follow the directions to create your username and password.     Your access code is: [email or web address]  Expires: 2018 11:27 AM     Your access code will  in 90 days. If you need help or a new code, please call your [facility] clinic or [phone].        Care EveryWhere ID     This is your Care EveryWhere ID. This could be used by other organizations to access your [facility] medical records  [id number]        Equal Access to Services     LEXUS LONGORIA AH: Hadii manuela Campbell, waaxda froilan, qaybta kaalmada nikolai, luis valdez. So [facility] [phone].    ATENCIÓN: Si habla español, tiene a garcia disposición servicios gratuitos de asistencia lingüística. Gabi al [phone].    We comply with applicable federal civil rights laws and [place] laws. We do not discriminate on the basis of race, color, national " origin, age, disability, sex, sexual orientation, or gender identity.            After Visit Summary       This is your record. Keep this with you and show to your community pharmacist(s) and doctor(s) at your next visit.

## 2018-04-03 LAB
BACTERIA SPEC CULT: NO GROWTH
Lab: NORMAL
SPECIMEN SOURCE: NORMAL

## 2022-04-10 ENCOUNTER — APPOINTMENT (OUTPATIENT)
Dept: CT IMAGING | Facility: CLINIC | Age: 82
End: 2022-04-10
Attending: INTERNAL MEDICINE
Payer: COMMERCIAL

## 2022-04-10 ENCOUNTER — HOSPITAL ENCOUNTER (EMERGENCY)
Facility: CLINIC | Age: 82
Discharge: HOME | End: 2022-04-10
Attending: INTERNAL MEDICINE | Admitting: INTERNAL MEDICINE
Payer: COMMERCIAL

## 2022-04-10 VITALS
HEART RATE: 60 BPM | OXYGEN SATURATION: 95 % | TEMPERATURE: 97.3 F | WEIGHT: 130 LBS | BODY MASS INDEX: 25.52 KG/M2 | HEIGHT: 60 IN | DIASTOLIC BLOOD PRESSURE: 80 MMHG | RESPIRATION RATE: 18 BRPM | SYSTOLIC BLOOD PRESSURE: 168 MMHG

## 2022-04-10 DIAGNOSIS — M54.81 OCCIPITAL NEURALGIA, UNSPECIFIED LATERALITY: ICD-10-CM

## 2022-04-10 DIAGNOSIS — H93.8X2 EAR FULLNESS, LEFT: ICD-10-CM

## 2022-04-10 LAB
ANION GAP SERPL CALCULATED.3IONS-SCNC: 2 MMOL/L (ref 3–14)
BASOPHILS # BLD AUTO: 0 10E3/UL (ref 0–0.2)
BASOPHILS NFR BLD AUTO: 0 %
BUN SERPL-MCNC: 14 MG/DL (ref 7–30)
CALCIUM SERPL-MCNC: 9.3 MG/DL (ref 8.5–10.1)
CHLORIDE BLD-SCNC: 103 MMOL/L (ref 94–109)
CO2 SERPL-SCNC: 30 MMOL/L (ref 20–32)
CREAT SERPL-MCNC: 0.67 MG/DL (ref 0.52–1.04)
CRP SERPL-MCNC: 3 MG/L (ref 0–8)
EOSINOPHIL # BLD AUTO: 0.3 10E3/UL (ref 0–0.7)
EOSINOPHIL NFR BLD AUTO: 4 %
ERYTHROCYTE [DISTWIDTH] IN BLOOD BY AUTOMATED COUNT: 12.8 % (ref 10–15)
ERYTHROCYTE [SEDIMENTATION RATE] IN BLOOD BY WESTERGREN METHOD: 15 MM/HR (ref 0–30)
GFR SERPL CREATININE-BSD FRML MDRD: 87 ML/MIN/1.73M2
GLUCOSE BLD-MCNC: 123 MG/DL (ref 70–99)
HCT VFR BLD AUTO: 44.8 % (ref 35–47)
HGB BLD-MCNC: 14.1 G/DL (ref 11.7–15.7)
HOLD SPECIMEN: NORMAL
IMM GRANULOCYTES # BLD: 0 10E3/UL
IMM GRANULOCYTES NFR BLD: 1 %
LYMPHOCYTES # BLD AUTO: 1.9 10E3/UL (ref 0.8–5.3)
LYMPHOCYTES NFR BLD AUTO: 26 %
MCH RBC QN AUTO: 28.7 PG (ref 26.5–33)
MCHC RBC AUTO-ENTMCNC: 31.5 G/DL (ref 31.5–36.5)
MCV RBC AUTO: 91 FL (ref 78–100)
MONOCYTES # BLD AUTO: 0.9 10E3/UL (ref 0–1.3)
MONOCYTES NFR BLD AUTO: 12 %
NEUTROPHILS # BLD AUTO: 4.3 10E3/UL (ref 1.6–8.3)
NEUTROPHILS NFR BLD AUTO: 57 %
NRBC # BLD AUTO: 0 10E3/UL
NRBC BLD AUTO-RTO: 0 /100
PLATELET # BLD AUTO: 217 10E3/UL (ref 150–450)
POTASSIUM BLD-SCNC: 3.6 MMOL/L (ref 3.4–5.3)
RBC # BLD AUTO: 4.92 10E6/UL (ref 3.8–5.2)
SODIUM SERPL-SCNC: 135 MMOL/L (ref 133–144)
WBC # BLD AUTO: 7.5 10E3/UL (ref 4–11)

## 2022-04-10 PROCEDURE — 86140 C-REACTIVE PROTEIN: CPT | Performed by: INTERNAL MEDICINE

## 2022-04-10 PROCEDURE — 85652 RBC SED RATE AUTOMATED: CPT | Performed by: INTERNAL MEDICINE

## 2022-04-10 PROCEDURE — 85025 COMPLETE CBC W/AUTO DIFF WBC: CPT | Performed by: INTERNAL MEDICINE

## 2022-04-10 PROCEDURE — 80048 BASIC METABOLIC PNL TOTAL CA: CPT | Performed by: INTERNAL MEDICINE

## 2022-04-10 PROCEDURE — 72125 CT NECK SPINE W/O DYE: CPT

## 2022-04-10 PROCEDURE — 99285 EMERGENCY DEPT VISIT HI MDM: CPT | Mod: 25

## 2022-04-10 PROCEDURE — 70450 CT HEAD/BRAIN W/O DYE: CPT

## 2022-04-10 PROCEDURE — 36415 COLL VENOUS BLD VENIPUNCTURE: CPT | Performed by: INTERNAL MEDICINE

## 2022-04-10 ASSESSMENT — ENCOUNTER SYMPTOMS
NECK PAIN: 1
ARTHRALGIAS: 1
HEADACHES: 1
SHORTNESS OF BREATH: 0
FATIGUE: 1

## 2022-04-10 NOTE — ED PROVIDER NOTES
History   Chief Complaint:  Headache and Ear Fullness       The history is provided by the patient and the spouse.      Yasmin Larry is a 82 year old female with history of hypertension, hyperlipidemia, TIA, cerebral artery occlusion, and cerebral infarction who presents with headache and ear fullness. The patient reports onset of intermittent headaches beginning around 6 months ago. She states in the beginning her headaches were at the back of her head, but now it has spread to different areas and hurts to move her neck. Per 's report, the patient does not usually have great range of motion in her neck, and it is worse on the left side. The patient states she typically uses Tylenol and ice packs to relieve her headaches. The patient also reports a left sided ear ache. The patient states her hearing is not the best at baseline. Her  states her left ear is typically worse. The patient also reports some intermittent left arm pain which she describes as sore. She states her arm pain began while on vacation in March. The patient also endorses some shortness of breath when going up stairs and this has gotten slightly worse since having Covid-19. The patient reports she and her  recently had Covid-19 on March 8th and are still very fatigued from it. The patient denies being a gum chewer and her  states she does not grind her teeth at night. The patient reports no chest pain or difficulty breathing.     Review of Systems   Constitutional: Positive for fatigue.   HENT: Positive for ear pain (l).    Respiratory: Negative for shortness of breath.    Cardiovascular: Negative for chest pain.   Musculoskeletal: Positive for arthralgias (l arm) and neck pain.   Neurological: Positive for headaches.   All other systems reviewed and are negative.      Allergies:  No known drug allergies     Medications:  amLODIPine (NORVASC) 5 mg tablet    Ascorbic Acid (VITAMIN C PO)  aspirin (ECOTRIN) 81 mg  enteric coated tablet  ATORVASTATIN CALCIUM PO  Calcium Citrate-Vitamin D (CALCIUM CITRATE + D PO)  Cyanocobalamin (B-12 DOTS PO)  levothyroxine (SYNTHROID) 100 mcg tablet    levothyroxine (SYNTHROID) 112 mcg tablet    LOSARTAN POTASSIUM PO  NADOLOL PO  omeprazole (PRILOSEC) 20 mg Delayed-Release capsule    traZODone (DESYREL) 50 MG tablet  propranoloL (INDERAL) 20 mg tablet    pravastatin (PRAVACHOL) 40 mg tablet      Past Medical History:     Arthritis   Cerebral artery occlusion   Cerebral infarction    Hypertension   Hypothyroid   Sleep apnea   Pelvic organ prolapse quantification stage 4 cystocele  Lumbar stenosis   TIA  Concussion   Dysphagia   Sleep apnea   Essential tremor   Malignant neoplasm kidney   Hyperlipidemia   Dysthymic disorder   Insomnia   Gait abnormality     Past Surgical History:    Cholecystectomy   Cystoscopy  Davinci hysterectomy and supracervical sacrocolpopexy combined   Davinci rectopexy   Davinci salpingo-oophorectomy   Hernia repair   Laparoscopic lysis adhesions   Nephrectomy     Family History:    Brother: heart disease, Karla Gehrig's disease   Father: renal cell carcinoma, heart disease  Sister: thyroid disease  Mother: CAD    Social History:  The patient presents to the emergency department with her , Arron.   The patient arrived to the emergency department via car.     Physical Exam     Patient Vitals for the past 24 hrs:   BP Temp Temp src Pulse Resp SpO2 Height Weight   04/10/22 1749 (!) 195/86 97.3  F (36.3  C) Temporal 70 18 95 % 1.524 m (5') 59 kg (130 lb)       Physical Exam  Constitutional:       Comments: Pleasant and cooperative   HENT:      Head:      Comments: No distinct tenderness over the scalp     Right Ear: Tympanic membrane normal.      Left Ear: Tympanic membrane normal.      Mouth/Throat:      Pharynx: No posterior oropharyngeal erythema.      Comments: No crepitus over the TMJs  Eyes:      Conjunctiva/sclera: Conjunctivae normal.   Cardiovascular:      Rate  and Rhythm: Normal rate and regular rhythm.      Heart sounds: Normal heart sounds.   Pulmonary:      Effort: Pulmonary effort is normal.      Breath sounds: Normal breath sounds.   Abdominal:      General: Bowel sounds are normal. There is no distension.      Palpations: Abdomen is soft.      Tenderness: There is no abdominal tenderness. There is no guarding or rebound.   Musculoskeletal:         General: Normal range of motion.      Cervical back: Neck supple.   Skin:     General: Skin is warm and dry.      Comments: No rash suggestive of shingles.  Patient points out an area of rash that started about 3 weeks ago on the left side of her neck after she wore a sweater.  This appears to have only minimal erythema.   Neurological:      Mental Status: She is alert.         Emergency Department Course     Imaging:  Cervical spine CT w/o contrast   Final Result   IMPRESSION:   HEAD CT:   1.  No acute intracranial process.      CERVICAL SPINE CT:   1.  No CT evidence for acute fracture or post traumatic subluxation.   2.  Multilevel degenerative changes throughout the cervical spine as detailed above.      Head CT w/o contrast   Final Result   IMPRESSION:   HEAD CT:   1.  No acute intracranial process.      CERVICAL SPINE CT:   1.  No CT evidence for acute fracture or post traumatic subluxation.   2.  Multilevel degenerative changes throughout the cervical spine as detailed above.        The above imaging workup was performed.   Report per radiology    Laboratory:  Labs Ordered and Resulted from Time of ED Arrival to Time of ED Departure   BASIC METABOLIC PANEL - Abnormal       Result Value    Sodium 135      Potassium 3.6      Chloride 103      Carbon Dioxide (CO2) 30      Anion Gap 2 (*)     Urea Nitrogen 14      Creatinine 0.67      Calcium 9.3      Glucose 123 (*)     GFR Estimate 87     ERYTHROCYTE SEDIMENTATION RATE AUTO - Normal    Erythrocyte Sedimentation Rate 15     CRP INFLAMMATION - Normal    CRP Inflammation  3.0     CBC WITH PLATELETS AND DIFFERENTIAL    WBC Count 7.5      RBC Count 4.92      Hemoglobin 14.1      Hematocrit 44.8      MCV 91      MCH 28.7      MCHC 31.5      RDW 12.8      Platelet Count 217      % Neutrophils 57      % Lymphocytes 26      % Monocytes 12      % Eosinophils 4      % Basophils 0      % Immature Granulocytes 1      NRBCs per 100 WBC 0      Absolute Neutrophils 4.3      Absolute Lymphocytes 1.9      Absolute Monocytes 0.9      Absolute Eosinophils 0.3      Absolute Basophils 0.0      Absolute Immature Granulocytes 0.0      Absolute NRBCs 0.0          Emergency Department Course:             Reviewed:  I reviewed nursing notes, vitals, past medical history and Care Everywhere    Assessments:  1816 I obtained history and examined the patient as noted above.   1956 I rechecked the patient and explained findings.     Disposition:  The patient was discharged to home.     Impression & Plan     Medical Decision Making:    Yasmin Larry is a 82 year old female who presents to the emergency department with ongoing headache, neck pain, and now left ear fullness and pain.  I suspect her headache is related to the neck pain and some aspects sound suggestive of occipital neuralgia.  CT does not show any evidence of intracranial bleed or mass.  No significant elevation of inflammatory markers to suggest a vasculitis.  The ear is completely normal on exam and there is no evidence of fluid collections in the middle ear or mastoids on CT.  I wonder if her sense of ear fullness is the first manifestation of Ménière's disease or related to the TMJ.  I have referred her to ENT and neurology for close follow-up.  I suggested primary care versus [facility] regarding her neck.      Diagnosis:    ICD-10-CM    1. Occipital neuralgia, unspecified laterality  M54.81    2. Ear fullness, left  H93.8X2        Discharge Medications:  New Prescriptions    No medications on file       Scribe  Disclosure:  I, Mica Dunia, am serving as a scribe at 6:16 PM on 4/10/2022 to document services personally performed by Ana Paula Evnas MD based on my observations and the provider's statements to me.          Ana Paula Evans MD  04/10/22 5251

## 2022-07-28 ENCOUNTER — APPOINTMENT (OUTPATIENT)
Dept: CT IMAGING | Facility: CLINIC | Age: 82
End: 2022-07-28
Attending: EMERGENCY MEDICINE
Payer: COMMERCIAL

## 2022-07-28 ENCOUNTER — HOSPITAL ENCOUNTER (EMERGENCY)
Facility: CLINIC | Age: 82
Discharge: HOME | End: 2022-07-29
Attending: EMERGENCY MEDICINE | Admitting: EMERGENCY MEDICINE
Payer: COMMERCIAL

## 2022-07-28 DIAGNOSIS — L02.211 ABDOMINAL WALL ABSCESS: ICD-10-CM

## 2022-07-28 DIAGNOSIS — T81.43XA POSTOPERATIVE INTRA-ABDOMINAL ABSCESS (H): ICD-10-CM

## 2022-07-28 DIAGNOSIS — K65.1 POSTOPERATIVE INTRA-ABDOMINAL ABSCESS (H): ICD-10-CM

## 2022-07-28 LAB
ALBUMIN UR-MCNC: 30 MG/DL
ANION GAP SERPL CALCULATED.3IONS-SCNC: 11 MMOL/L (ref 7–15)
APPEARANCE UR: CLEAR
BASOPHILS # BLD AUTO: 0 10E3/UL (ref 0–0.2)
BASOPHILS NFR BLD AUTO: 0 %
BILIRUB UR QL STRIP: NEGATIVE
BUN SERPL-MCNC: 12.8 MG/DL (ref 8–23)
CALCIUM SERPL-MCNC: 9.7 MG/DL (ref 8.8–10.2)
CHLORIDE SERPL-SCNC: 100 MMOL/L (ref 98–107)
COLOR UR AUTO: YELLOW
CREAT SERPL-MCNC: 0.73 MG/DL (ref 0.51–0.95)
DEPRECATED HCO3 PLAS-SCNC: 27 MMOL/L (ref 22–29)
EOSINOPHIL # BLD AUTO: 0.1 10E3/UL (ref 0–0.7)
EOSINOPHIL NFR BLD AUTO: 1 %
ERYTHROCYTE [DISTWIDTH] IN BLOOD BY AUTOMATED COUNT: 11.8 % (ref 10–15)
FLUAV RNA SPEC QL NAA+PROBE: NEGATIVE
FLUBV RNA RESP QL NAA+PROBE: NEGATIVE
GFR SERPL CREATININE-BSD FRML MDRD: 82 ML/MIN/1.73M2
GLUCOSE SERPL-MCNC: 129 MG/DL (ref 70–99)
GLUCOSE UR STRIP-MCNC: NEGATIVE MG/DL
HCT VFR BLD AUTO: 43.9 % (ref 35–47)
HGB BLD-MCNC: 13.8 G/DL (ref 11.7–15.7)
HGB UR QL STRIP: NEGATIVE
HOLD SPECIMEN: NORMAL
HOLD SPECIMEN: NORMAL
IMM GRANULOCYTES # BLD: 0.1 10E3/UL
IMM GRANULOCYTES NFR BLD: 0 %
KETONES UR STRIP-MCNC: NEGATIVE MG/DL
LACTATE SERPL-SCNC: 0.7 MMOL/L (ref 0.7–2)
LEUKOCYTE ESTERASE UR QL STRIP: ABNORMAL
LYMPHOCYTES # BLD AUTO: 0.8 10E3/UL (ref 0.8–5.3)
LYMPHOCYTES NFR BLD AUTO: 6 %
MCH RBC QN AUTO: 28.2 PG (ref 26.5–33)
MCHC RBC AUTO-ENTMCNC: 31.4 G/DL (ref 31.5–36.5)
MCV RBC AUTO: 90 FL (ref 78–100)
MONOCYTES # BLD AUTO: 1.7 10E3/UL (ref 0–1.3)
MONOCYTES NFR BLD AUTO: 13 %
MUCOUS THREADS #/AREA URNS LPF: PRESENT /LPF
NEUTROPHILS # BLD AUTO: 11.2 10E3/UL (ref 1.6–8.3)
NEUTROPHILS NFR BLD AUTO: 80 %
NITRATE UR QL: NEGATIVE
NRBC # BLD AUTO: 0 10E3/UL
NRBC BLD AUTO-RTO: 0 /100
PH UR STRIP: 6.5 [PH] (ref 5–7)
PLATELET # BLD AUTO: 285 10E3/UL (ref 150–450)
POTASSIUM SERPL-SCNC: 4.3 MMOL/L (ref 3.4–5.3)
RADIOLOGIST FLAGS: ABNORMAL
RBC # BLD AUTO: 4.9 10E6/UL (ref 3.8–5.2)
RBC URINE: 11 /HPF
RSV RNA SPEC NAA+PROBE: NEGATIVE
SARS-COV-2 RNA RESP QL NAA+PROBE: NEGATIVE
SODIUM SERPL-SCNC: 138 MMOL/L (ref 136–145)
SP GR UR STRIP: 1 (ref 1–1.03)
SQUAMOUS EPITHELIAL: <1 /HPF
UROBILINOGEN UR STRIP-MCNC: 2 MG/DL
WBC # BLD AUTO: 13.9 10E3/UL (ref 4–11)
WBC URINE: 3 /HPF

## 2022-07-28 PROCEDURE — 250N000011 HC RX IP 250 OP 636: Performed by: EMERGENCY MEDICINE

## 2022-07-28 PROCEDURE — 82310 ASSAY OF CALCIUM: CPT | Performed by: EMERGENCY MEDICINE

## 2022-07-28 PROCEDURE — 250N000009 HC RX 250: Performed by: EMERGENCY MEDICINE

## 2022-07-28 PROCEDURE — 99285 EMERGENCY DEPT VISIT HI MDM: CPT | Mod: 25

## 2022-07-28 PROCEDURE — 85025 COMPLETE CBC W/AUTO DIFF WBC: CPT | Performed by: EMERGENCY MEDICINE

## 2022-07-28 PROCEDURE — 87637 SARSCOV2&INF A&B&RSV AMP PRB: CPT | Performed by: EMERGENCY MEDICINE

## 2022-07-28 PROCEDURE — 96365 THER/PROPH/DIAG IV INF INIT: CPT

## 2022-07-28 PROCEDURE — 99284 EMERGENCY DEPT VISIT MOD MDM: CPT | Mod: 25

## 2022-07-28 PROCEDURE — 36415 COLL VENOUS BLD VENIPUNCTURE: CPT | Performed by: EMERGENCY MEDICINE

## 2022-07-28 PROCEDURE — 74177 CT ABD & PELVIS W/CONTRAST: CPT

## 2022-07-28 PROCEDURE — C9803 HOPD COVID-19 SPEC COLLECT: HCPCS

## 2022-07-28 PROCEDURE — 96366 THER/PROPH/DIAG IV INF ADDON: CPT

## 2022-07-28 PROCEDURE — 83605 ASSAY OF LACTIC ACID: CPT | Performed by: EMERGENCY MEDICINE

## 2022-07-28 PROCEDURE — 81001 URINALYSIS AUTO W/SCOPE: CPT | Performed by: EMERGENCY MEDICINE

## 2022-07-28 PROCEDURE — 87086 URINE CULTURE/COLONY COUNT: CPT | Performed by: EMERGENCY MEDICINE

## 2022-07-28 RX ORDER — MORPHINE SULFATE 4 MG/ML
4 INJECTION, SOLUTION INTRAMUSCULAR; INTRAVENOUS ONCE
Status: DISCONTINUED | OUTPATIENT
Start: 2022-07-28 | End: 2022-07-29 | Stop reason: HOSPADM

## 2022-07-28 RX ORDER — IOPAMIDOL 755 MG/ML
500 INJECTION, SOLUTION INTRAVASCULAR ONCE
Status: COMPLETED | OUTPATIENT
Start: 2022-07-28 | End: 2022-07-28

## 2022-07-28 RX ADMIN — TAZOBACTAM SODIUM AND PIPERACILLIN SODIUM 4.5 G: 500; 4 INJECTION, SOLUTION INTRAVENOUS at 21:44

## 2022-07-28 RX ADMIN — SODIUM CHLORIDE 57 ML: 9 INJECTION, SOLUTION INTRAVENOUS at 19:55

## 2022-07-28 RX ADMIN — IOPAMIDOL 62 ML: 755 INJECTION, SOLUTION INTRAVENOUS at 19:55

## 2022-07-28 ASSESSMENT — ENCOUNTER SYMPTOMS
DIARRHEA: 1
DYSURIA: 0
FEVER: 1
NAUSEA: 1
ABDOMINAL PAIN: 1
VOMITING: 0

## 2022-07-28 NOTE — ED TRIAGE NOTES
Pt reports that she has had weakness for the past 3 wks following a post hernia repair. PT reports some fevers at home and generalized fatigue. PT reports that she took a COVID test at home and it was negative. PT reports tenderness in her lower abdomen. PT VSS and ABC's intact. PT  Reports 2 days of diarrhea. Pt reports nausea as well.

## 2022-07-28 NOTE — ED PROVIDER NOTES
History     Chief Complaint:  Fever and Generalized Weakness      HPI   Yasmin Larry is a 82 year old female who presents with concern for an infection.  The patient notes that 3 weeks ago she had an umbilical hernia repair at [facility].  She says this was an outpatient procedure.  She initially felt well but over the last week has been feeling more generalized fatigue.  She states she has spent the last couple of days in bed.  She notes that from Monday through today she has felt progressively worse, she has noted diarrhea over the last 2 days along with nausea but no vomiting.  She also noted a 100.4 temperature last night and is febrile at 100.4 on presentation today.  She states that she has also had 3 days of lower abdominal pain in the region of her surgery.  She notes that in March 2022 she was diagnosed with diverticulitis but has been very careful not to eat seeds since then and does not feel that this is the same discomfort as when she had diverticulitis.    Review of Systems   Constitutional: Positive for fever.   Cardiovascular: Negative for chest pain.   Gastrointestinal: Positive for abdominal pain, diarrhea and nausea. Negative for vomiting.   Genitourinary: Negative for dysuria.   All other systems reviewed and are negative.    Allergies:  No Known Allergies    Medications:    amoxicillin-clavulanate (AUGMENTIN) 875-125 MG tablet  ondansetron (ZOFRAN ODT) 4 MG ODT tab  oxyCODONE (ROXICODONE) 5 MG tablet  Acetaminophen (TYLENOL PO)  AMLODIPINE BESYLATE PO  Ascorbic Acid (VITAMIN C PO)  ASPIRIN PO  ATORVASTATIN CALCIUM PO  Calcium Citrate-Vitamin D (CALCIUM CITRATE + D PO)  Cyanocobalamin (B-12 DOTS PO)  LEVOTHYROXINE SODIUM PO  LOSARTAN POTASSIUM PO  Misc Natural Products (NATURAL EMU RELIEF EX)  NADOLOL PO  Omega-3 Fatty Acids (FISH OIL PO)  OMEPRAZOLE PO  traZODone (DESYREL) 50 MG tablet         Past Medical History:   Past Surgical History:     Past Medical History:   Diagnosis Date      Arthritis      Cerebral artery occlusion with cerebral infarction (H)      Hypertension      Hypothyroid      Other chronic pain      PONV (postoperative nausea and vomiting)      Sleep apnea     Past Surgical History:   Procedure Laterality Date     CHOLECYSTECTOMY       CHOLECYSTECTOMY       CYSTOSCOPY, SLING TRANSVAGINAL N/A 1/3/2018    Procedure: CYSTOSCOPY, SLING TRANSVAGINAL;;  Surgeon: Robles López MD;  Location: [facility] OR     DAVINCI HYSTERECTOMY SUPRACERVICAL, SACROCOLPOPEXY, COMBINED N/A 1/3/2018    Procedure: COMBINED DAVINCI XI HYSTERECTOMY SUPRACERVICAL, SACROCOLPOPEXY;;  Surgeon: Robles López MD;  Location: [facility] OR     DAVINCI RECTOPEXY N/A 1/3/2018    Procedure: DAVINCI XI RECTOPEXY;;  Surgeon: Anusha Nicole MD;  Location: [facility] OR     DAVINCI SALPINGO-OOPHORECTOMY INCLUDING BILATERAL Bilateral 1/3/2018    Procedure: DAVINCI XI SALPINGO-OOPHORECTOMY INCLUDING BILATERAL;  DAVINCI XI SUPRACERVICAL HYSTERECTOMY, BILATERAL SALPINGO-OOPHORECTOMY, SACROCOLPOPEXY, TRANSVAGINAL SLING, CYSTOSCOPY (DR. ENGLISH);  DAVINCI XI VENTRAL RECTOPEXY, LAPAROSCOPIC EXTENSIVE LYSIS OF ADHESIONS (DR. NICOLE) ;  Surgeon: Robles López MD;  Location: [facility] OR     HERNIA REPAIR      ventral hernia     LAPAROSCOPIC LYSIS ADHESIONS N/A 1/3/2018    Procedure: LAPAROSCOPIC LYSIS ADHESIONS;;  Surgeon: Anusha Nicole MD;  Location:  OR     NEPHRECTOMY        Patient Active Problem List    Diagnosis Date Noted     Prolapse of female pelvic organs 01/04/2018     Priority: Medium     Pelvic organ prolapse quantification stage 4 cystocele 01/03/2018     Priority: Medium     Cerebral infarction (H) 05/14/2013     Priority: Medium     Diagnosis updated by automated process. Provider to review and confirm.            Family History  No family history on file.    Social History:  PCP: Nae Looney  Presents to the ED with her     Physical Exam     Patient Vitals for the past 24 hrs:   BP Temp Temp src  Pulse Resp SpO2 Weight   07/29/22 0000 -- -- -- 102 27 -- --   07/28/22 2345 -- -- -- 101 26 -- --   07/28/22 2330 135/65 -- -- 104 29 90 % --   07/28/22 2315 134/71 -- -- 102 25 -- --   07/28/22 2300 (!) 122/91 -- -- 105 28 97 % --   07/28/22 2245 116/62 -- -- 99 26 91 % --   07/28/22 2230 135/68 -- -- 98 26 91 % --   07/28/22 2215 121/76 -- -- 98 23 94 % --   07/28/22 2200 138/71 -- -- 98 22 95 % --   07/28/22 2145 129/72 -- -- 96 16 94 % --   07/28/22 2130 132/73 -- -- 100 17 94 % --   07/28/22 2030 -- -- -- 94 17 95 % --   07/28/22 2015 (!) 140/67 -- -- 98 21 -- --   07/28/22 1945 113/54 -- -- 90 25 94 % --   07/28/22 1930 125/65 -- -- 98 22 93 % --   07/28/22 1915 128/65 -- -- 96 27 94 % --   07/28/22 1435 127/69 100.4  F (38  C) Temporal 95 18 95 % 61.6 kg (135 lb 12.9 oz)       Physical Exam  Constitutional: Vital signs reviewed as above.   HENT:    Head: No external signs of trauma noted.   Eyes: Conjunctivae are normal. Pupils are equal, round, and reactive to light.   Cardiovascular:    Normal rate, regular rhythm and normal heart sounds.     Exam reveals no friction rub.     No murmur heard.  Pulmonary/Chest:    Effort normal and breath sounds normal.    No respiratory distress.    There are no wheezes.    There are no rales.   Gastrointestinal:    Soft.    Bowel sounds normal.    There is no distension.    There is periumbilical tenderness along with some firmness around the infraumbilical region.   There is no rebound or guarding.   Musculoskeletal:    Normal range of motion.    Normal Tone  Neurological: Patient is alert and oriented to person, place, and time.   Skin: There is faint redness in the periumbilical region extending in an inferior direction.  Her surgical site appears clean, dry, and intact.  There is no drainage noted.  Psychiatric: The patient appears calm      Emergency Department Course     Imaging:  CT Abdomen Pelvis w Contrast   Final Result   Abnormal   IMPRESSION:    1.  Loculated  3.6 x 4.4 x 6.6 cm abscess within the anterior midline peritoneal cavity extending into the periumbilical subcutaneous fat and abutting the skin surface, located just inferior to the indwelling mesh.      2.  Moderate hiatal hernia.      3.  2.3 cm right adrenal nodule, unchanged since 2014 and likely benign.      [Urgent Result: Abscess]      Finding was identified on 7/28/2022 8:04 PM.       Dr. Gonzales was contacted by me on 7/28/2022 8:20 PM and verbalized understanding of the critical result.          Laboratory:  Labs Ordered and Resulted from Time of ED Arrival to Time of ED Departure   BASIC METABOLIC PANEL - Abnormal       Result Value    Creatinine 0.73      Sodium 138      Potassium 4.3      Urea Nitrogen 12.8      Chloride 100      Carbon Dioxide (CO2) 27      Anion Gap 11      Glucose 129 (*)     GFR Estimate 82      Calcium 9.7     CBC WITH PLATELETS AND DIFFERENTIAL - Abnormal    WBC Count 13.9 (*)     RBC Count 4.90      Hemoglobin 13.8      Hematocrit 43.9      MCV 90      MCH 28.2      MCHC 31.4 (*)     RDW 11.8      Platelet Count 285      % Neutrophils 80      % Lymphocytes 6      % Monocytes 13      % Eosinophils 1      % Basophils 0      % Immature Granulocytes 0      NRBCs per 100 WBC 0      Absolute Neutrophils 11.2 (*)     Absolute Lymphocytes 0.8      Absolute Monocytes 1.7 (*)     Absolute Eosinophils 0.1      Absolute Basophils 0.0      Absolute Immature Granulocytes 0.1      Absolute NRBCs 0.0     ROUTINE UA WITH MICROSCOPIC REFLEX TO CULTURE - Abnormal    Color Urine Yellow      Appearance Urine Clear      Glucose Urine Negative      Bilirubin Urine Negative      Ketones Urine Negative      Specific Gravity Urine 1.005      Blood Urine Negative      pH Urine 6.5      Protein Albumin Urine 30  (*)     Urobilinogen Urine 2.0      Nitrite Urine Negative      Leukocyte Esterase Urine Moderate (*)     Mucus Urine Present (*)     RBC Urine 11 (*)     WBC Urine 3      Squamous Epithelials Urine  <1     INFLUENZA A/B & SARS-COV2 PCR MULTIPLEX - Normal    Influenza A PCR Negative      Influenza B PCR Negative      RSV PCR Negative      SARS CoV2 PCR Negative     LACTIC ACID WHOLE BLOOD - Normal    Lactic Acid 0.7     URINE CULTURE       Procedures:      Emergency Department Course:           Reviewed:    I reviewed nursing notes, vitals, past history and care everywhere    Assessments/Consults:   ED Course as of 22 0059   u 2022   184 Dr. Gonzales' evaluation.    D/W Dr. Gutiérrez ([facility]). 6.6 cm abscess in ventral abd wall with some gas.    Updated patient and .    D/W Dr. Callahan    D/W Dr. Weiner ([facility]). Would recommend Abx CT or US guided drain. The [facility] transfer center notes that they don't have beds right now, but they will call us after 11 PM and update us.    Udpated. Patient not excited about admission.    Rechecked       Interventions:  Medications   morphine (PF) injection 4 mg (4 mg Intravenous Not Given 22)   piperacillin-tazobactam (ZOSYN) intermittent infusion 4.5 g (0 g Intravenous Stopped 22)   CT SCAN FLUSH (57 mLs Intravenous Given 22)   iopamidol (ISOVUE-370) solution 500 mL (62 mLs Intravenous Given 22)   amoxicillin-clavulanate (AUGMENTIN) 875-125 MG per tablet 1 tablet (1 tablet Oral Given 22)       Disposition:  The patient was discharged to home.    Impression & Plan        CMS Diagnoses:         Medical Decision Makin-year-old female patient presents to the ED due to concern for infection.  Please see the HPI and exam for specifics.  A broad differential was considered but is predominantly concern for abdominal wall infection and/or hernia.  The above work-up was undertaken.  Significant findings include the above-mentioned abscess.  After consultation with surgery, my initial plan was to consider admission at [facility] for IR drainage and antibiotic therapy.   Unfortunately, there were no beds readily available.    The patient received antibiotics in the emergency department but after several discussions, elected to go home. (There is no evening IR coverage at [facility]). She wondered if she could return to the emergency department and have a drain placed by interventional radiology.  I told her that the emergency department is open 24 hours a day and is always happy to reevaluate somebody.  Ideally, she should be seen by her surgeon and I did encourage her to contact their office later this morning.  If the patient is able, going to the [facility] emergency department would not be unreasonable.  I again reiterated that if the patient feels worse she can and should return to the closest emergency department.  Anticipatory guidance given to patient and  prior to discharge.    Critical Care time:  none    Covid-19  Yasmin Larry was evaluated during a global COVID-19 pandemic, which necessitated consideration that the patient might be at risk for infection with the SARS-CoV-2 virus that causes COVID-19.  Applicable protocols for evaluation were followed during the patient's care. COVID-19 was considered as part of the patient's evaluation.       Diagnosis:    ICD-10-CM    1. Postoperative intra-abdominal abscess  T81.43XA    2. Abdominal wall abscess  L02.211        Discharge Medications:  Discharge Medication List as of 7/29/2022 12:23 AM      START taking these medications    Details   amoxicillin-clavulanate (AUGMENTIN) 875-125 MG tablet Take 1 tablet by mouth 2 times daily for 10 days, Disp-20 tablet, R-0, E-Prescribe      ondansetron (ZOFRAN ODT) 4 MG ODT tab Take 1 tablet (4 mg) by mouth every 8 hours as needed for nausea or vomiting, Disp-10 tablet, R-0, E-Prescribe      oxyCODONE (ROXICODONE) 5 MG tablet Take 1 tablet (5 mg) by mouth every 6 hours as needed for severe pain, Disp-6 tablet, R-0, E-Prescribe                    Levi Gonzales,  DO  07/29/22 0103

## 2022-07-29 VITALS
TEMPERATURE: 100.4 F | HEART RATE: 102 BPM | OXYGEN SATURATION: 90 % | SYSTOLIC BLOOD PRESSURE: 135 MMHG | WEIGHT: 135.8 LBS | DIASTOLIC BLOOD PRESSURE: 65 MMHG | RESPIRATION RATE: 27 BRPM | BODY MASS INDEX: 26.52 KG/M2

## 2022-07-29 PROCEDURE — 250N000013 HC RX MED GY IP 250 OP 250 PS 637: Performed by: EMERGENCY MEDICINE

## 2022-07-29 RX ORDER — ONDANSETRON 4 MG/1
4 TABLET, ORALLY DISINTEGRATING ORAL EVERY 8 HOURS PRN
Qty: 10 TABLET | Refills: 0 | Status: SHIPPED | OUTPATIENT
Start: 2022-07-29 | End: 2022-08-01

## 2022-07-29 RX ORDER — OXYCODONE HYDROCHLORIDE 5 MG/1
5 TABLET ORAL EVERY 6 HOURS PRN
Qty: 6 TABLET | Refills: 0 | Status: SHIPPED | OUTPATIENT
Start: 2022-07-29 | End: 2022-08-01

## 2022-07-29 RX ADMIN — AMOXICILLIN AND CLAVULANATE POTASSIUM 1 TABLET: 875; 125 TABLET, FILM COATED ORAL at 00:29

## 2022-07-29 NOTE — DISCHARGE INSTRUCTIONS
Diagnosis: Postsurgical abscess.  What do you do next:   Hospitalization for drain placement by interventional radiology was recommended by the surgeon from [facility] I spoke with on the phone.   We gave you IV antibiotics here as well as a tablet antibiotic.  We will respect your decision to go home.  Monitor your symptoms very closely.  At bare minimum you should contact your surgeon's office in the morning, but I think it is more reasonable to proceed to the [facility] emergency department for evaluation by their team.  Continue your home medications unless we have specifically changed them  Follow up as indicated below    When do you return: If you have uncontrolled fevers, intractable vomiting, uncontrolled pain, confusion,, or any other symptoms that concern you, please return to the ED for reevaluation.    Thank you for allowing us to care for you today.

## 2022-07-30 LAB — BACTERIA UR CULT: NORMAL

## 2022-10-04 ENCOUNTER — OFFICE VISIT (OUTPATIENT)
Dept: OBGYN | Facility: CLINIC | Age: 82
End: 2022-10-04
Payer: COMMERCIAL

## 2022-10-04 VITALS
SYSTOLIC BLOOD PRESSURE: 122 MMHG | DIASTOLIC BLOOD PRESSURE: 70 MMHG | BODY MASS INDEX: 26.11 KG/M2 | WEIGHT: 133 LBS | HEIGHT: 60 IN

## 2022-10-04 DIAGNOSIS — L90.0 LICHEN SCLEROSUS: ICD-10-CM

## 2022-10-04 DIAGNOSIS — N90.89 LESION OF VULVA: Primary | ICD-10-CM

## 2022-10-04 LAB
CLUE CELLS: PRESENT
TRICHOMONAS, WET PREP: ABNORMAL
WBC'S/HIGH POWER FIELD, WET PREP: ABNORMAL
YEAST, WET PREP: ABNORMAL

## 2022-10-04 PROCEDURE — 87210 SMEAR WET MOUNT SALINE/INK: CPT | Performed by: OBSTETRICS & GYNECOLOGY

## 2022-10-04 PROCEDURE — 87591 N.GONORRHOEAE DNA AMP PROB: CPT | Performed by: OBSTETRICS & GYNECOLOGY

## 2022-10-04 PROCEDURE — 87491 CHLMYD TRACH DNA AMP PROBE: CPT | Performed by: OBSTETRICS & GYNECOLOGY

## 2022-10-04 PROCEDURE — 99204 OFFICE O/P NEW MOD 45 MIN: CPT | Performed by: OBSTETRICS & GYNECOLOGY

## 2022-10-04 PROCEDURE — 87529 HSV DNA AMP PROBE: CPT | Performed by: OBSTETRICS & GYNECOLOGY

## 2022-10-04 RX ORDER — CLOBETASOL PROPIONATE 0.5 MG/G
OINTMENT TOPICAL
Qty: 45 G | Refills: 3 | Status: SHIPPED | OUTPATIENT
Start: 2022-10-04 | End: 2024-01-10

## 2022-10-04 RX ORDER — PRAVASTATIN SODIUM 40 MG
40 TABLET ORAL DAILY
Status: ON HOLD | COMMUNITY
End: 2023-11-20

## 2022-10-04 NOTE — NURSING NOTE
Chief Complaint   Patient presents with     Vaginal Problem     Sore and itchy on left side       Initial /70 (BP Location: Left arm, Patient Position: Chair, Cuff Size: Adult Regular)   Ht 1.524 m (5')   Wt 60.3 kg (133 lb)   Breastfeeding No   BMI 25.97 kg/m   Estimated body mass index is 25.97 kg/m  as calculated from the following:    Height as of this encounter: 1.524 m (5').    Weight as of this encounter: 60.3 kg (133 lb).  BP completed using cuff size: regular    Questioned patient about current smoking habits.  Pt. has never smoked.      No obstetric history on file.    The following HM Due: NONE    Margarette Willett, CMA

## 2022-10-04 NOTE — PROGRESS NOTES
SUBJECTIVE:                                                   CC:  Patient presents with:  Vaginal Problem: Sore and itchy on left side      HPI:  Yasmin Larry is a 82 year old who presents with vaginal itching x2-3 months.  It is tolerable, not terribly.  Was painful initially.  No bleeding.  Unsure if discharge.  Was using an OTC hydrocortisone on it initially, her PCP advised her to stop.  Now using vaseline which helps a little bit.  Has a history of hysterctomy and bladder sling.  She doesn't have any current HENOK symptoms but does have some occasional OAB symptoms (accidentally not making it to the toilet when getting up overnight to pee).  She wears a daily poise pad due to not knowing when she is going to have some urine leakage.  Not sexually active.     Gyn History:  No LMP recorded. Patient is postmenopausal.  PMH, PSH, Soc Hx, Fam Hx, Meds, and allergies reviewed in Epic.    OBJECTIVE:     /70 (BP Location: Left arm, Patient Position: Chair, Cuff Size: Adult Regular)   Ht 1.524 m (5')   Wt 60.3 kg (133 lb)   Breastfeeding No   BMI 25.97 kg/m      Gen: Healthy appearing female, no acute distress, comfortable, appears younger than stated age  Psych: mentation appears normal and affect bright  : Normal external female genitalia.  No external lesions.  Atrophic labia majora c/w menopausal status.  There is a small 5mm genital ulcer on the right labia minora which is slightly tender to palpation.  Skin is atrophic, there is some fusion of the anterior clitoral juan, no appearance of perianal involvement.  Ulcer swabbed with HSV cultures.  Wet prep and gc/chlam obtained.      Test Results:  Results for orders placed or performed in visit on 10/04/22 (from the past 24 hour(s))   Wet preparation    Specimen: Vagina; Swab   Result Value Ref Range    Trichomonas Absent Absent    Yeast Absent Absent    Clue Cells Present (A) Absent    WBCs/high power field 2+ (A) None       ASSESSMENT/PLAN:                                                       1. Lesion of vulva, ddx atrophic vaginitis, contact dermatitis, lichen sclerosus, lichen simplex chronicus  Plan for ruling out all infections today, trying treatment of LS with clobetasol, and RTC in 4-6 wks for biopsy of the ulcerated area if not completely improved.   - HSV Types 1 and 2 Qualitative PCR  - NEISSERIA GONORRHOEA PCR  - CHLAMYDIA TRACHOMATIS PCR  - Wet preparation    2. Lichen sclerosus  - clobetasol (TEMOVATE) 0.05 % external ointment; Apply topically.  Use daily during a flare, apply twice weekly between flares for prevention.  Dispense: 45 g; Refill: 3     3. Bacterial vaginosis  - metrogel/flagyl    RTC 4-6 weeks for likely vulvar biopsy    Lindsay Simpson MD, MPH  Obstetrics and Gynecology

## 2022-10-05 LAB
C TRACH DNA SPEC QL NAA+PROBE: NEGATIVE
HSV1 DNA SPEC QL NAA+PROBE: NOT DETECTED
HSV2 DNA SPEC QL NAA+PROBE: NOT DETECTED
N GONORRHOEA DNA SPEC QL NAA+PROBE: NEGATIVE

## 2022-10-07 DIAGNOSIS — N76.0 BACTERIAL VAGINOSIS: Primary | ICD-10-CM

## 2022-10-07 DIAGNOSIS — B96.89 BACTERIAL VAGINOSIS: Primary | ICD-10-CM

## 2022-10-07 RX ORDER — METRONIDAZOLE 500 MG/1
500 TABLET ORAL 2 TIMES DAILY
Qty: 14 TABLET | Refills: 0 | Status: SHIPPED | OUTPATIENT
Start: 2022-10-07 | End: 2022-10-14

## 2022-11-14 ENCOUNTER — OFFICE VISIT (OUTPATIENT)
Dept: OBGYN | Facility: CLINIC | Age: 82
End: 2022-11-14
Payer: COMMERCIAL

## 2022-11-14 VITALS
WEIGHT: 133 LBS | SYSTOLIC BLOOD PRESSURE: 122 MMHG | HEIGHT: 60 IN | DIASTOLIC BLOOD PRESSURE: 68 MMHG | BODY MASS INDEX: 26.11 KG/M2

## 2022-11-14 DIAGNOSIS — B96.89 BACTERIAL VAGINOSIS: Primary | ICD-10-CM

## 2022-11-14 DIAGNOSIS — B96.89 BACTERIAL VAGINOSIS: ICD-10-CM

## 2022-11-14 DIAGNOSIS — N90.89 LESION OF VULVA: Primary | ICD-10-CM

## 2022-11-14 DIAGNOSIS — N76.0 BACTERIAL VAGINOSIS: ICD-10-CM

## 2022-11-14 DIAGNOSIS — N76.0 BACTERIAL VAGINOSIS: Primary | ICD-10-CM

## 2022-11-14 PROCEDURE — 88305 TISSUE EXAM BY PATHOLOGIST: CPT | Performed by: PATHOLOGY

## 2022-11-14 PROCEDURE — 56605 BIOPSY OF VULVA/PERINEUM: CPT | Performed by: OBSTETRICS & GYNECOLOGY

## 2022-11-14 PROCEDURE — 99214 OFFICE O/P EST MOD 30 MIN: CPT | Mod: 25 | Performed by: OBSTETRICS & GYNECOLOGY

## 2022-11-14 PROCEDURE — 87210 SMEAR WET MOUNT SALINE/INK: CPT | Performed by: OBSTETRICS & GYNECOLOGY

## 2022-11-14 RX ORDER — METRONIDAZOLE 500 MG/1
500 TABLET ORAL 2 TIMES DAILY
Qty: 14 TABLET | Refills: 0 | Status: SHIPPED | OUTPATIENT
Start: 2022-11-14 | End: 2022-11-21

## 2022-11-14 NOTE — PROGRESS NOTES
"  SUBJECTIVE:                                                   CC:  Patient presents with:  Follow Up: Right labial ulcer      HPI:  Yasmin Larry is a 82 year old who presents with vulvar lesion, this is a follow-up exam from 6 wks ago.  The itching is improved with clobetasol, the but ulcer isn't completely gone.  Wonders if she can use the clobetasol daily because it really helps.  She was diagnosed with BV last visit but did not  the metronidazole.       She wears a daily poise pad due to not knowing when she is going to have some urine leakage.  Not sexually active.     Gyn History:  No LMP recorded. Patient is postmenopausal.  PMH, PSH, Soc Hx, Fam Hx, Meds, and allergies reviewed in Epic.    OBJECTIVE:     /68 (BP Location: Left arm, Patient Position: Chair, Cuff Size: Adult Regular)   Ht 1.524 m (5')   Wt 60.3 kg (133 lb)   Breastfeeding No   BMI 25.97 kg/m      Gen: Healthy appearing female, no acute distress, comfortable, appears younger than stated age  Psych: mentation appears normal and affect bright  : Normal external female genitalia.  No external lesions.  Atrophic labia majora c/w menopausal status.  There is a small 5mm genital ulcer on the right labia minora which is slightly tender to palpation.  Skin is atrophic, there is some fusion of the anterior clitoral juan, no appearance of perianal involvement.     there do appear to be \"kissing\" lesions of the right labia minora with the contact point on the left.     INDICATIONS:                                                    Yasmin Larry is a 82 year old female that was found to have a right vulvar lesion.    Is a pregnancy test required: No.  Was a consent obtained?  Yes    PROCEDURE:                                                      The area was prepped with Betadine. the area was injected with 1 cc's of 1%  Lidocaine without epinephrine. After adequate anesthesia was reached, the skin was flattened with one " hand and a sample of the abnormal area was made with a 3 mm punch biopsy instrument.  The skin disc was elevated and scissors used to cut the underlying tissue.  The specimen was placed in formalin and sent to pathology for evaluation.  Pressure was applied to the biopsy site to control bleeding. Silver nitrate was applied.  Hemostasis was adequate.     POST PROCEDURE:                                                      She was observed.  She tolerated the procedure well with minimal discomfort. There were no complications. Patient was discharged in stable condition.    Hot Sitz bath BID, no douching, tampons or intercourse.  Call if bleeding, swelling, pain, redness or fever occur.  Patient will be called with pathology results.    Lindsay Simpson MD       Test Results:  Results for orders placed or performed in visit on 11/14/22 (from the past 24 hour(s))   Wet prep - Clinic Collect    Specimen: Vagina; Swab   Result Value Ref Range    Trichomonas Absent Absent    Yeast Absent Absent    Clue Cells Present (A) Absent    WBCs/high power field 2+ (A) None       ASSESSMENT/PLAN:                                                      1. Lesion of vulva, ddx atrophic vaginitis, contact dermatitis, lichen sclerosus, lichen simplex chronicus  - Surgical Pathology Exam  - BIOPSY VULVA/PERINEUM, ONE LESION    2. Bacterial vaginosis  Will see if still present, can treat if still present (did not take tx last time)  - Wet prep - Clinic Collect      Lindsay Simpson MD, MPH  Obstetrics and Gynecology

## 2022-11-16 LAB
PATH REPORT.COMMENTS IMP SPEC: NORMAL
PATH REPORT.COMMENTS IMP SPEC: NORMAL
PATH REPORT.FINAL DX SPEC: NORMAL
PATH REPORT.GROSS SPEC: NORMAL
PATH REPORT.MICROSCOPIC SPEC OTHER STN: NORMAL
PATH REPORT.RELEVANT HX SPEC: NORMAL
PHOTO IMAGE: NORMAL

## 2023-01-09 ENCOUNTER — OFFICE VISIT (OUTPATIENT)
Dept: OBGYN | Facility: CLINIC | Age: 83
End: 2023-01-09
Payer: COMMERCIAL

## 2023-01-09 VITALS
HEIGHT: 60 IN | DIASTOLIC BLOOD PRESSURE: 60 MMHG | SYSTOLIC BLOOD PRESSURE: 110 MMHG | BODY MASS INDEX: 26.11 KG/M2 | WEIGHT: 133 LBS

## 2023-01-09 DIAGNOSIS — N90.89 LESION OF VULVA: ICD-10-CM

## 2023-01-09 DIAGNOSIS — N95.2 ATROPHIC VAGINITIS: Primary | ICD-10-CM

## 2023-01-09 PROCEDURE — 99214 OFFICE O/P EST MOD 30 MIN: CPT | Performed by: OBSTETRICS & GYNECOLOGY

## 2023-01-09 NOTE — PROGRESS NOTES
SUBJECTIVE:                                                   CC:  Patient presents with:  Vaginal Problem: Recheck vulvar lesion, bxed       HPI:  Yasmin Larry is a 82 year old who presents with vulvar lesion, this is a follow-up exam from 2 months ago.  Originally presented with vulvar itching found to have a small ulcer, which was biopsied and was benign hyperkeratosis with inflammation.  She was initially given clobetasol which did improve the itching, and she is still using it intermittently.  She had initially been wearing a daily panty liner, and is now cut down to only using that when she is leaving the house. She does still have some symptoms of a tiny bit of burning upon initiation of urination right near the opening of the urethra.  She states, she could live with it but just wants to make sure that nothing else is going wrong in that area.  Not sexually active.    She states she was given a prescription for estrogen cream several years ago, and at the time she used it and it did improve some of her symptoms.  She recalls it being very expensive which had turned her off from its use.  She does still have some of this in a tube at home, unsure if it is  or not.    Gyn History:  No LMP recorded. Patient is postmenopausal.  PMH, PSH, Soc Hx, Fam Hx, Meds, and allergies reviewed in Epic.    OBJECTIVE:     /60 (BP Location: Right arm, Patient Position: Chair, Cuff Size: Adult Regular)   Ht 1.524 m (5')   Wt 60.3 kg (133 lb)   Breastfeeding No   BMI 25.97 kg/m      Gen: Healthy appearing female, no acute distress, comfortable, appears younger than stated age  Psych: mentation appears normal and affect bright  : Normal external female genitalia.  No external lesions.  Atrophic labia majora c/w menopausal status.  There is a small 5mm genital ulcer on the right labia minora which is slightly tender to palpation.  Skin is atrophic, there is some fusion of the anterior clitoral juan,  "no appearance of perianal involvement.     there do appear to be \"kissing\" lesions of the right labia minora with the contact point on the left.    There is also a slightly whitish area measuring 3mm on the posterior introitus which is non tender.     Test Results:  Vulva, biopsy-  Hyperkeratosis with chronic inflammation, no evidence of dysplasia or malignancy    ASSESSMENT/PLAN:                                                      1. Atrophic vaginitis  I suggested checking the expiration date on her old tube of estrogen cream, and if unexpired she could attempt to use this at the area of pain with urination.  - conjugated estrogens (PREMARIN) 0.625 MG/GM vaginal cream; Place 0.5 g vaginally twice a week  Dispense: 30 g; Refill: 1    2. Lesion of vulva, ddx atrophic vaginitis, contact dermatitis, lichen sclerosus, lichen simplex chronicus  Encouraged ongoing sparing use of pantyliners, and reviewed that she can attempt to use the clobetasol versus the premarin and see which is most helpful.  Encouraged annual vulvar exam and biopsy of any further lesions as indicated.     Lindsay Simpson MD, MPH  Obstetrics and Gynecology    "

## 2023-01-09 NOTE — NURSING NOTE
Chief Complaint   Patient presents with     Vaginal Problem     Recheck vulvar lesion, bxed 11/14       Initial /60 (BP Location: Right arm, Patient Position: Chair, Cuff Size: Adult Regular)   Ht 1.524 m (5')   Wt 60.3 kg (133 lb)   Breastfeeding No   BMI 25.97 kg/m   Estimated body mass index is 25.97 kg/m  as calculated from the following:    Height as of this encounter: 1.524 m (5').    Weight as of this encounter: 60.3 kg (133 lb).  BP completed using cuff size: regular    Questioned patient about current smoking habits.  Pt. has never smoked.      No obstetric history on file.    The following HM Due: NONE    Margarette Willett, CMA

## 2023-11-19 ENCOUNTER — HOSPITAL ENCOUNTER (OUTPATIENT)
Facility: CLINIC | Age: 83
Setting detail: OBSERVATION
Discharge: HOME | End: 2023-11-20
Attending: EMERGENCY MEDICINE | Admitting: STUDENT IN AN ORGANIZED HEALTH CARE EDUCATION/TRAINING PROGRAM
Payer: COMMERCIAL

## 2023-11-19 ENCOUNTER — APPOINTMENT (OUTPATIENT)
Dept: MRI IMAGING | Facility: CLINIC | Age: 83
End: 2023-11-19
Attending: EMERGENCY MEDICINE
Payer: COMMERCIAL

## 2023-11-19 ENCOUNTER — APPOINTMENT (OUTPATIENT)
Dept: CT IMAGING | Facility: CLINIC | Age: 83
End: 2023-11-19
Attending: EMERGENCY MEDICINE
Payer: COMMERCIAL

## 2023-11-19 DIAGNOSIS — H53.123 TRANSIENT VISUAL LOSS OF BOTH EYES: Primary | ICD-10-CM

## 2023-11-19 DIAGNOSIS — E78.5 HYPERLIPIDEMIA LDL GOAL <70: ICD-10-CM

## 2023-11-19 DIAGNOSIS — I65.1 BASILAR ARTERY STENOSIS: ICD-10-CM

## 2023-11-19 DIAGNOSIS — G45.9 TIA (TRANSIENT ISCHEMIC ATTACK): ICD-10-CM

## 2023-11-19 LAB
ANION GAP SERPL CALCULATED.3IONS-SCNC: 11 MMOL/L (ref 7–15)
APTT PPP: 30 SECONDS (ref 22–38)
BASOPHILS # BLD AUTO: 0 10E3/UL (ref 0–0.2)
BASOPHILS NFR BLD AUTO: 0 %
BUN SERPL-MCNC: 13.5 MG/DL (ref 8–23)
CALCIUM SERPL-MCNC: 9.2 MG/DL (ref 8.8–10.2)
CHLORIDE SERPL-SCNC: 99 MMOL/L (ref 98–107)
CREAT SERPL-MCNC: 0.74 MG/DL (ref 0.51–0.95)
DEPRECATED HCO3 PLAS-SCNC: 26 MMOL/L (ref 22–29)
EGFRCR SERPLBLD CKD-EPI 2021: 80 ML/MIN/1.73M2
EOSINOPHIL # BLD AUTO: 0.2 10E3/UL (ref 0–0.7)
EOSINOPHIL NFR BLD AUTO: 2 %
ERYTHROCYTE [DISTWIDTH] IN BLOOD BY AUTOMATED COUNT: 12.5 % (ref 10–15)
GLUCOSE BLDC GLUCOMTR-MCNC: 155 MG/DL (ref 70–99)
GLUCOSE SERPL-MCNC: 144 MG/DL (ref 70–99)
HBA1C MFR BLD: 6 %
HCT VFR BLD AUTO: 43.7 % (ref 35–47)
HGB BLD-MCNC: 14.4 G/DL (ref 11.7–15.7)
HOLD SPECIMEN: NORMAL
IMM GRANULOCYTES # BLD: 0.1 10E3/UL
IMM GRANULOCYTES NFR BLD: 1 %
INR PPP: 0.93 (ref 0.85–1.15)
LYMPHOCYTES # BLD AUTO: 1.9 10E3/UL (ref 0.8–5.3)
LYMPHOCYTES NFR BLD AUTO: 24 %
MCH RBC QN AUTO: 29.2 PG (ref 26.5–33)
MCHC RBC AUTO-ENTMCNC: 33 G/DL (ref 31.5–36.5)
MCV RBC AUTO: 89 FL (ref 78–100)
MONOCYTES # BLD AUTO: 0.8 10E3/UL (ref 0–1.3)
MONOCYTES NFR BLD AUTO: 10 %
NEUTROPHILS # BLD AUTO: 5 10E3/UL (ref 1.6–8.3)
NEUTROPHILS NFR BLD AUTO: 63 %
NRBC # BLD AUTO: 0 10E3/UL
NRBC BLD AUTO-RTO: 0 /100
PLATELET # BLD AUTO: 254 10E3/UL (ref 150–450)
POTASSIUM SERPL-SCNC: 3.8 MMOL/L (ref 3.4–5.3)
RBC # BLD AUTO: 4.93 10E6/UL (ref 3.8–5.2)
SODIUM SERPL-SCNC: 136 MMOL/L (ref 135–145)
TSH SERPL DL<=0.005 MIU/L-ACNC: 1.51 UIU/ML (ref 0.3–4.2)
WBC # BLD AUTO: 8 10E3/UL (ref 4–11)

## 2023-11-19 PROCEDURE — 83036 HEMOGLOBIN GLYCOSYLATED A1C: CPT | Performed by: NURSE PRACTITIONER

## 2023-11-19 PROCEDURE — 70544 MR ANGIOGRAPHY HEAD W/O DYE: CPT

## 2023-11-19 PROCEDURE — 82310 ASSAY OF CALCIUM: CPT | Performed by: EMERGENCY MEDICINE

## 2023-11-19 PROCEDURE — 85730 THROMBOPLASTIN TIME PARTIAL: CPT | Performed by: EMERGENCY MEDICINE

## 2023-11-19 PROCEDURE — 250N000009 HC RX 250: Performed by: EMERGENCY MEDICINE

## 2023-11-19 PROCEDURE — 99223 1ST HOSP IP/OBS HIGH 75: CPT | Mod: AI | Performed by: NURSE PRACTITIONER

## 2023-11-19 PROCEDURE — 85610 PROTHROMBIN TIME: CPT | Performed by: EMERGENCY MEDICINE

## 2023-11-19 PROCEDURE — 99418 PROLNG IP/OBS E/M EA 15 MIN: CPT | Performed by: NURSE PRACTITIONER

## 2023-11-19 PROCEDURE — 85025 COMPLETE CBC W/AUTO DIFF WBC: CPT | Performed by: EMERGENCY MEDICINE

## 2023-11-19 PROCEDURE — 84443 ASSAY THYROID STIM HORMONE: CPT | Performed by: NURSE PRACTITIONER

## 2023-11-19 PROCEDURE — 255N000002 HC RX 255 OP 636: Performed by: EMERGENCY MEDICINE

## 2023-11-19 PROCEDURE — 80061 LIPID PANEL: CPT | Performed by: NURSE PRACTITIONER

## 2023-11-19 PROCEDURE — 86140 C-REACTIVE PROTEIN: CPT | Performed by: PHYSICIAN ASSISTANT

## 2023-11-19 PROCEDURE — G0378 HOSPITAL OBSERVATION PER HR: HCPCS

## 2023-11-19 PROCEDURE — 70498 CT ANGIOGRAPHY NECK: CPT

## 2023-11-19 PROCEDURE — 99285 EMERGENCY DEPT VISIT HI MDM: CPT | Mod: 25

## 2023-11-19 PROCEDURE — 70553 MRI BRAIN STEM W/O & W/DYE: CPT

## 2023-11-19 PROCEDURE — 82962 GLUCOSE BLOOD TEST: CPT

## 2023-11-19 PROCEDURE — 70496 CT ANGIOGRAPHY HEAD: CPT

## 2023-11-19 PROCEDURE — A9585 GADOBUTROL INJECTION: HCPCS | Performed by: EMERGENCY MEDICINE

## 2023-11-19 PROCEDURE — 250N000011 HC RX IP 250 OP 636: Performed by: EMERGENCY MEDICINE

## 2023-11-19 PROCEDURE — 250N000013 HC RX MED GY IP 250 OP 250 PS 637: Performed by: EMERGENCY MEDICINE

## 2023-11-19 PROCEDURE — 70549 MR ANGIOGRAPH NECK W/O&W/DYE: CPT

## 2023-11-19 PROCEDURE — 93005 ELECTROCARDIOGRAM TRACING: CPT

## 2023-11-19 PROCEDURE — 250N000013 HC RX MED GY IP 250 OP 250 PS 637: Performed by: NURSE PRACTITIONER

## 2023-11-19 PROCEDURE — 36415 COLL VENOUS BLD VENIPUNCTURE: CPT | Performed by: EMERGENCY MEDICINE

## 2023-11-19 RX ORDER — ACETAMINOPHEN 650 MG/1
650 SUPPOSITORY RECTAL EVERY 4 HOURS PRN
Status: DISCONTINUED | OUTPATIENT
Start: 2023-11-19 | End: 2023-11-20 | Stop reason: HOSPADM

## 2023-11-19 RX ORDER — PROCHLORPERAZINE 25 MG
12.5 SUPPOSITORY, RECTAL RECTAL EVERY 12 HOURS PRN
Status: DISCONTINUED | OUTPATIENT
Start: 2023-11-19 | End: 2023-11-20 | Stop reason: HOSPADM

## 2023-11-19 RX ORDER — ASPIRIN 81 MG/1
81 TABLET ORAL DAILY
Status: DISCONTINUED | OUTPATIENT
Start: 2023-11-20 | End: 2023-11-20

## 2023-11-19 RX ORDER — ATORVASTATIN CALCIUM 40 MG/1
40 TABLET, FILM COATED ORAL
Status: DISCONTINUED | OUTPATIENT
Start: 2023-11-19 | End: 2023-11-20 | Stop reason: HOSPADM

## 2023-11-19 RX ORDER — ACETAMINOPHEN 325 MG/10.15ML
650 LIQUID ORAL EVERY 4 HOURS PRN
Status: DISCONTINUED | OUTPATIENT
Start: 2023-11-19 | End: 2023-11-20 | Stop reason: HOSPADM

## 2023-11-19 RX ORDER — ONDANSETRON 2 MG/ML
4 INJECTION INTRAMUSCULAR; INTRAVENOUS EVERY 6 HOURS PRN
Status: DISCONTINUED | OUTPATIENT
Start: 2023-11-19 | End: 2023-11-20 | Stop reason: HOSPADM

## 2023-11-19 RX ORDER — HYDRALAZINE HYDROCHLORIDE 20 MG/ML
10-20 INJECTION INTRAMUSCULAR; INTRAVENOUS
Status: DISCONTINUED | OUTPATIENT
Start: 2023-11-19 | End: 2023-11-20 | Stop reason: HOSPADM

## 2023-11-19 RX ORDER — PRAVASTATIN SODIUM 20 MG
40 TABLET ORAL DAILY
Status: DISCONTINUED | OUTPATIENT
Start: 2023-11-20 | End: 2023-11-19

## 2023-11-19 RX ORDER — ASPIRIN 81 MG/1
324 TABLET, CHEWABLE ORAL ONCE
Status: COMPLETED | OUTPATIENT
Start: 2023-11-19 | End: 2023-11-19

## 2023-11-19 RX ORDER — LABETALOL HYDROCHLORIDE 5 MG/ML
10-40 INJECTION, SOLUTION INTRAVENOUS EVERY 10 MIN PRN
Status: DISCONTINUED | OUTPATIENT
Start: 2023-11-19 | End: 2023-11-20 | Stop reason: HOSPADM

## 2023-11-19 RX ORDER — ONDANSETRON 4 MG/1
4 TABLET, ORALLY DISINTEGRATING ORAL EVERY 6 HOURS PRN
Status: DISCONTINUED | OUTPATIENT
Start: 2023-11-19 | End: 2023-11-20 | Stop reason: HOSPADM

## 2023-11-19 RX ORDER — ASPIRIN 81 MG/1
81 TABLET, CHEWABLE ORAL DAILY
Status: DISCONTINUED | OUTPATIENT
Start: 2023-11-20 | End: 2023-11-20

## 2023-11-19 RX ORDER — PROCHLORPERAZINE MALEATE 5 MG
5 TABLET ORAL EVERY 6 HOURS PRN
Status: DISCONTINUED | OUTPATIENT
Start: 2023-11-19 | End: 2023-11-20 | Stop reason: HOSPADM

## 2023-11-19 RX ORDER — GADOBUTROL 604.72 MG/ML
10 INJECTION INTRAVENOUS ONCE
Status: COMPLETED | OUTPATIENT
Start: 2023-11-19 | End: 2023-11-19

## 2023-11-19 RX ORDER — ACETAMINOPHEN 325 MG/1
650 TABLET ORAL EVERY 4 HOURS PRN
Status: DISCONTINUED | OUTPATIENT
Start: 2023-11-19 | End: 2023-11-20 | Stop reason: HOSPADM

## 2023-11-19 RX ORDER — AMOXICILLIN 250 MG
2 CAPSULE ORAL 2 TIMES DAILY
Status: DISCONTINUED | OUTPATIENT
Start: 2023-11-19 | End: 2023-11-20 | Stop reason: HOSPADM

## 2023-11-19 RX ORDER — LORAZEPAM 1 MG/1
1 TABLET ORAL ONCE
Status: COMPLETED | OUTPATIENT
Start: 2023-11-19 | End: 2023-11-19

## 2023-11-19 RX ORDER — CLOPIDOGREL BISULFATE 75 MG/1
75 TABLET ORAL DAILY
Status: DISCONTINUED | OUTPATIENT
Start: 2023-11-20 | End: 2023-11-20

## 2023-11-19 RX ORDER — TRAZODONE HYDROCHLORIDE 50 MG/1
50 TABLET, FILM COATED ORAL AT BEDTIME
Status: DISCONTINUED | OUTPATIENT
Start: 2023-11-19 | End: 2023-11-20 | Stop reason: HOSPADM

## 2023-11-19 RX ORDER — AMOXICILLIN 250 MG
1 CAPSULE ORAL 2 TIMES DAILY
Status: DISCONTINUED | OUTPATIENT
Start: 2023-11-19 | End: 2023-11-20 | Stop reason: HOSPADM

## 2023-11-19 RX ORDER — IOPAMIDOL 755 MG/ML
500 INJECTION, SOLUTION INTRAVASCULAR ONCE
Status: COMPLETED | OUTPATIENT
Start: 2023-11-19 | End: 2023-11-19

## 2023-11-19 RX ORDER — CLOPIDOGREL BISULFATE 75 MG/1
300 TABLET ORAL ONCE
Status: COMPLETED | OUTPATIENT
Start: 2023-11-19 | End: 2023-11-19

## 2023-11-19 RX ADMIN — GADOBUTROL 10 ML: 604.72 INJECTION INTRAVENOUS at 17:30

## 2023-11-19 RX ADMIN — CLOPIDOGREL BISULFATE 300 MG: 75 TABLET ORAL at 20:29

## 2023-11-19 RX ADMIN — LORAZEPAM 1 MG: 1 TABLET ORAL at 15:46

## 2023-11-19 RX ADMIN — SODIUM CHLORIDE 80 ML: 9 INJECTION, SOLUTION INTRAVENOUS at 18:54

## 2023-11-19 RX ADMIN — ACETAMINOPHEN 650 MG: 325 TABLET, FILM COATED ORAL at 23:35

## 2023-11-19 RX ADMIN — TRAZODONE HYDROCHLORIDE 50 MG: 50 TABLET ORAL at 23:35

## 2023-11-19 RX ADMIN — IOPAMIDOL 67 ML: 755 INJECTION, SOLUTION INTRAVENOUS at 18:54

## 2023-11-19 RX ADMIN — ASPIRIN 81 MG CHEWABLE TABLET 324 MG: 81 TABLET CHEWABLE at 20:29

## 2023-11-19 ASSESSMENT — ACTIVITIES OF DAILY LIVING (ADL)
ADLS_ACUITY_SCORE: 35
ADLS_ACUITY_SCORE: 37

## 2023-11-19 NOTE — ED PROVIDER NOTES
History     Chief Complaint:  Loss of Vision     HPI   Yasmin Larry is a 83 year old female with a history of a TIA, hypertension, essential tremor, and malignant neoplasm of kidney here for evaluation of loss of vision. Patient states that she noticed a loss of vision in her right eye when she was attempting to read a screen far away this morning at [employer]. She had some pain in her right eye this morning as well. She is able to see in her left eye but not clearly. Notes that she was able to drive and did not have trouble with close up vision. Here in the ED, patient is able to see now with both eyes. In addition patient says she felt very tired yesterday. Patient also states seeing a red dot in her right eye a couple days ago and has had some transient floaters for the past few weeks. She also reports having double vision one night. Of note, her TIA ten years ago had similar presentation with visual changes. Patient denies weakness, numbness and tingling in her body. Patient takes 81 mg aspirin daily.    Independent Historian:    None     Review of External Notes:  Reviewed 10/30/2023 office visit    Medications:    Cefdinir  Zetia  Loperamide  Methocarbamol   Propranolol  Tizanidine  Aspirin 81 MG  Gabapentin  Levothyroxine  Losartan  Nadolol  Omeprazole  Pravastatin  Trazodone   Amlodipine      Past Medical History:    Dystonia   IBS   Chronic pain sydrome   Vaginal atrophy   HELENA on CPAP   Prediabetes   Umbilical hernia   Labral tear of left hip joint   Lumbar stenosis   Chronic bilateral low back pain with sciatica   Hypertension  Concussion   Dysphagia   Sleep apnea   Essential tremor   Hyperlipidemia   Dysthymic disorder   Insomnia   Hypothyroidism   Gait abnormality   Lumbar radiculopathy   Adrenal nodule   Impaired fasting glucose   Malignant neoplasm of skin   Cerebral artery occlusion with cerebral infarction   Pelvic organ prolapse   Malignant neoplasm of kidney    Past Surgical History:    Cholecystectomy   Nephrectomy, right  Rectal prolapse repair   Open umbilical hernia with mesh    Cystoscopy, sling transvaginal   Hysterectomy   Rectopexy   Salpingo oophorectomy   Laparoscopic lysis adhesions     Physical Exam   Patient Vitals for the past 24 hrs:   BP Temp Temp src Pulse Resp SpO2   11/19/23 2119 -- -- -- -- -- 94 %   11/19/23 2109 (!) 167/85 -- -- -- -- --   11/19/23 2047 -- -- -- -- -- 94 %   11/19/23 2046 (!) 175/86 -- -- 73 -- --   11/19/23 1351 (!) 194/101 97.2  F (36.2  C) Temporal 73 18 97 %      Physical Exam  Constitutional: Alert, attentive  HENT:    Nose: Nose normal.    Mouth/Throat: Oropharynx is clear, mucous membranes are moist  Eyes: EOM are normal. Pupils are equal, round, and reactive to light.   CV: Regular rate and rhythm, no murmurs, rubs or gallops.  Chest: Effort normal and breath sounds normal.   GI: No distension. There is no tenderness  MSK: Normal range of motion.   Neurological:   A/Ox3;   Cranial nerves 2-12 intact;   5/5 strength throughout the upper and lower extremities;   sensation intact to light touch throughout the upper and lower extremities;   Skin: Skin is warm and dry.      Emergency Department Course   ECG  ECG taken at 1420, ECG read at 1428  Normal sinus rhythm  Anteroseptal infarct   Rate 63 bpm. PA interval 190 ms. QRS duration 66 ms. QT/QTc 398/407 ms. P-R-T axes 45 -6 -16.    Imaging:  CTA Head Neck with Contrast   Final Result   IMPRESSION:    HEAD CTA:   1.  Redemonstration of short segment marked narrowing proximal basilar artery with attenuated mid to distal aspect of basilar artery and to some degree posterior circulation as well. This could be due to a thromboembolic event.   2.  Attenuated and narrowed distal aspects of both vertebral arteries.   3.  Remainder of intracranial circulation appears normal.      NECK CTA:   1.  No hemodynamically significant narrowing in either ICA.   2.  Attenuated and narrowed distal aspects of both vertebral  arteries.      Results were called to Dr. AGRAWAL at 11/19/2023 7:54 PM CST.         MRA Neck (Carotids) wo & w Contrast   Final Result   IMPRESSION:   HEAD MRI:   1.  No acute infarct, mass, mass effect, or hemorrhage.   2.  Mild chronic small vessel ischemia.   3.  Moderate atrophy.      HEAD MRA:   1.  Probable short segment marked narrowing proximal basilar artery with attenuated opacification and decreased lumen of mid to distal basilar artery, distal vertebral arteries, and to some degree posterior circulation as well. CTA neck recommended in    further evaluation.   2.  Intracranial circulation appears otherwise normal.      NECK MRA:   Normal neck MRA.      Results were called to Dr. AGRAWAL at 11/19/2023 5:55 PM CST.       MR Brain w/o & w Contrast   Final Result   IMPRESSION:   HEAD MRI:   1.  No acute infarct, mass, mass effect, or hemorrhage.   2.  Mild chronic small vessel ischemia.   3.  Moderate atrophy.      HEAD MRA:   1.  Probable short segment marked narrowing proximal basilar artery with attenuated opacification and decreased lumen of mid to distal basilar artery, distal vertebral arteries, and to some degree posterior circulation as well. CTA neck recommended in    further evaluation.   2.  Intracranial circulation appears otherwise normal.      NECK MRA:   Normal neck MRA.      Results were called to Dr. AGRAWAL at 11/19/2023 5:55 PM CST.       MRA Brain ([place]) wo Contrast   Final Result   IMPRESSION:   HEAD MRI:   1.  No acute infarct, mass, mass effect, or hemorrhage.   2.  Mild chronic small vessel ischemia.   3.  Moderate atrophy.      HEAD MRA:   1.  Probable short segment marked narrowing proximal basilar artery with attenuated opacification and decreased lumen of mid to distal basilar artery, distal vertebral arteries, and to some degree posterior circulation as well. CTA neck recommended in    further evaluation.   2.  Intracranial circulation appears otherwise normal.       NECK MRA:   Normal neck MRA.      Results were called to Dr. AGRAWAL at 11/19/2023 5:55 PM CST.       Echocardiogram Complete    (Results Pending)     Report per radiology    Laboratory:  Labs Ordered and Resulted from Time of ED Arrival to Time of ED Departure   BASIC METABOLIC PANEL - Abnormal       Result Value    Sodium 136      Potassium 3.8      Chloride 99      Carbon Dioxide (CO2) 26      Anion Gap 11      Urea Nitrogen 13.5      Creatinine 0.74      GFR Estimate 80      Calcium 9.2      Glucose 144 (*)    GLUCOSE BY METER - Abnormal    GLUCOSE BY METER POCT 155 (*)    INR - Normal    INR 0.93     PARTIAL THROMBOPLASTIN TIME - Normal    aPTT 30     GLUCOSE MONITOR NURSING POCT   CBC WITH PLATELETS AND DIFFERENTIAL    WBC Count 8.0      RBC Count 4.93      Hemoglobin 14.4      Hematocrit 43.7      MCV 89      MCH 29.2      MCHC 33.0      RDW 12.5      Platelet Count 254      % Neutrophils 63      % Lymphocytes 24      % Monocytes 10      % Eosinophils 2      % Basophils 0      % Immature Granulocytes 1      NRBCs per 100 WBC 0      Absolute Neutrophils 5.0      Absolute Lymphocytes 1.9      Absolute Monocytes 0.8      Absolute Eosinophils 0.2      Absolute Basophils 0.0      Absolute Immature Granulocytes 0.1      Absolute NRBCs 0.0     HEMOGLOBIN A1C   TSH      Emergency Department Course & Assessments:     Interventions:  Medications      aspirin (ASA) chewable tablet 324 mg (324 mg Oral $Given 11/19/23 2029)   clopidogrel (PLAVIX) tablet 300 mg (300 mg Oral $Given 11/19/23 2029)          Assessments/Consultations/Discussion of Management or Tests:  ED Course as of 11/19/23 2215   Sun Nov 19, 2023   1434 I obtained history and examined the patient as noted above.    1816 I rechecked and updated the patient.    [facility] shows some proximal basilar artery narrowing potentially due to a thromboembolic event.    2023 I spoke with Dr. Magdaleno from stroke neurology regarding  possible admission for TIA workup.    2030 I rechecked and updated the patient.    2046 I spoke with Dr. Vogt of the hospitalist service regarding admission.    2111 I rechecked and updated the patient.    2214 I rechecked and updated the patient.      Social Determinants of Health affecting care:  none     Disposition:  The patient was admitted to the hospital under the care of Dr. Vogt.     Impression & Plan    Medical Decision Making:  This is a pleasant 83-year-old female presents for evaluation of transient vision changes concerning for possible TIA versus other.  Symptoms are resolved at this time she has a normal neurologic exam.  MRI brain fortunately shows no stroke but MRA imaging are concerning for possible posterior circulation stenosis.  CTA validates the same, and notably this may be related to thromboembolic event.  Stroke neurology was consulted and agree with the plan for TIA admission and aspirin/Plavix loading.  She remains with stable neurologic exam and safe for admission.      Diagnosis:    ICD-10-CM    1. 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    Scribe Disclosure:  I, Christin Cummings, am serving as a scribe  at 2:49 PM on 11/19/2023 to document services personally performed by Peewee Pacheco MD based on my observations and the provider's statements to me.    11/19/2023   Peewee Pacheco MD [place]ough[place], John Eric, MD  11/19/23 2231

## 2023-11-19 NOTE — ED TRIAGE NOTES
"Pt was at [facility] when she had vision change. Pt has hx of TIA several years ago. Pt reports vision impairment out of right eye. Takes baby aspirin daily. Reports her balance feels \"off\". BEFAST negative. MD in room for stroke evaluation.      Triage Assessment (Adult)       Row Name 11/19/23 3075          Triage Assessment    Airway WDL WDL        Respiratory WDL    Respiratory WDL WDL        Skin Circulation/Temperature WDL    Skin Circulation/Temperature WDL WDL        Cardiac WDL    Cardiac WDL WDL        Peripheral/Neurovascular WDL    Peripheral Neurovascular WDL WDL        Cognitive/Neuro/Behavioral WDL    Cognitive/Neuro/Behavioral WDL WDL                     "

## 2023-11-20 ENCOUNTER — APPOINTMENT (OUTPATIENT)
Dept: CARDIOLOGY | Facility: CLINIC | Age: 83
End: 2023-11-20
Attending: NURSE PRACTITIONER
Payer: COMMERCIAL

## 2023-11-20 ENCOUNTER — APPOINTMENT (OUTPATIENT)
Dept: CARDIOLOGY | Facility: CLINIC | Age: 83
End: 2023-11-20
Attending: PHYSICIAN ASSISTANT
Payer: COMMERCIAL

## 2023-11-20 VITALS
SYSTOLIC BLOOD PRESSURE: 137 MMHG | HEART RATE: 73 BPM | OXYGEN SATURATION: 93 % | TEMPERATURE: 98.6 F | RESPIRATION RATE: 18 BRPM | BODY MASS INDEX: 26.04 KG/M2 | DIASTOLIC BLOOD PRESSURE: 68 MMHG | HEIGHT: 59 IN | WEIGHT: 129.2 LBS

## 2023-11-20 LAB
ATRIAL RATE - MUSE: 63 BPM
CHOLEST SERPL-MCNC: 223 MG/DL
CRP SERPL-MCNC: 4.08 MG/L
DIASTOLIC BLOOD PRESSURE - MUSE: NORMAL MMHG
ERYTHROCYTE [SEDIMENTATION RATE] IN BLOOD BY WESTERGREN METHOD: 19 MM/HR (ref 0–30)
GLUCOSE BLDC GLUCOMTR-MCNC: 123 MG/DL (ref 70–99)
HDLC SERPL-MCNC: 48 MG/DL
INTERPRETATION ECG - MUSE: NORMAL
LDLC SERPL CALC-MCNC: 131 MG/DL
LVEF ECHO: NORMAL
NONHDLC SERPL-MCNC: 175 MG/DL
P AXIS - MUSE: 45 DEGREES
PR INTERVAL - MUSE: 190 MS
QRS DURATION - MUSE: 66 MS
QT - MUSE: 398 MS
QTC - MUSE: 407 MS
R AXIS - MUSE: -6 DEGREES
SYSTOLIC BLOOD PRESSURE - MUSE: NORMAL MMHG
T AXIS - MUSE: -16 DEGREES
TRIGL SERPL-MCNC: 218 MG/DL
VENTRICULAR RATE- MUSE: 63 BPM

## 2023-11-20 PROCEDURE — 36415 COLL VENOUS BLD VENIPUNCTURE: CPT | Performed by: PHYSICIAN ASSISTANT

## 2023-11-20 PROCEDURE — G0378 HOSPITAL OBSERVATION PER HR: HCPCS

## 2023-11-20 PROCEDURE — 93246 EXT ECG>7D<15D RECORDING: CPT

## 2023-11-20 PROCEDURE — 999N000226 HC STATISTIC SLP IP EVAL DEFER: Performed by: SPEECH-LANGUAGE PATHOLOGIST

## 2023-11-20 PROCEDURE — G0426 INPT/ED TELECONSULT50: HCPCS | Mod: G0 | Performed by: PHYSICIAN ASSISTANT

## 2023-11-20 PROCEDURE — 93306 TTE W/DOPPLER COMPLETE: CPT

## 2023-11-20 PROCEDURE — 85652 RBC SED RATE AUTOMATED: CPT | Performed by: PHYSICIAN ASSISTANT

## 2023-11-20 PROCEDURE — 93306 TTE W/DOPPLER COMPLETE: CPT | Mod: 26 | Performed by: INTERNAL MEDICINE

## 2023-11-20 PROCEDURE — 99239 HOSP IP/OBS DSCHRG MGMT >30: CPT | Performed by: PHYSICIAN ASSISTANT

## 2023-11-20 PROCEDURE — 250N000013 HC RX MED GY IP 250 OP 250 PS 637: Performed by: NURSE PRACTITIONER

## 2023-11-20 PROCEDURE — 93248 EXT ECG>7D<15D REV&INTERPJ: CPT | Performed by: INTERNAL MEDICINE

## 2023-11-20 PROCEDURE — 82962 GLUCOSE BLOOD TEST: CPT

## 2023-11-20 RX ORDER — LEVOTHYROXINE SODIUM 112 UG/1
112 TABLET ORAL
COMMUNITY

## 2023-11-20 RX ORDER — SENNOSIDES 8.6 MG
650 CAPSULE ORAL DAILY PRN
COMMUNITY
End: 2024-01-10

## 2023-11-20 RX ORDER — ASPIRIN 81 MG/1
81 TABLET ORAL DAILY
Status: ON HOLD | COMMUNITY
End: 2023-11-20

## 2023-11-20 RX ORDER — CLOPIDOGREL BISULFATE 75 MG/1
75 TABLET ORAL DAILY
Qty: 90 TABLET | Refills: 0 | Status: SHIPPED | OUTPATIENT
Start: 2023-11-21 | End: 2024-01-24

## 2023-11-20 RX ORDER — SENNOSIDES 8.6 MG
1300 CAPSULE ORAL 2 TIMES DAILY
COMMUNITY
End: 2024-01-10

## 2023-11-20 RX ORDER — ASPIRIN 325 MG
325 TABLET, DELAYED RELEASE (ENTERIC COATED) ORAL DAILY
Status: DISCONTINUED | OUTPATIENT
Start: 2023-11-21 | End: 2023-11-20 | Stop reason: HOSPADM

## 2023-11-20 RX ORDER — PROPRANOLOL HYDROCHLORIDE 20 MG/1
20 TABLET ORAL 2 TIMES DAILY
COMMUNITY
End: 2024-07-22

## 2023-11-20 RX ORDER — ASPIRIN 81 MG/1
81 TABLET ORAL DAILY
Status: DISCONTINUED | OUTPATIENT
Start: 2024-02-19 | End: 2023-11-20 | Stop reason: HOSPADM

## 2023-11-20 RX ORDER — ASPIRIN 325 MG
325 TABLET, DELAYED RELEASE (ENTERIC COATED) ORAL DAILY
Qty: 90 TABLET | Refills: 0 | Status: SHIPPED | OUTPATIENT
Start: 2023-11-21 | End: 2024-01-10

## 2023-11-20 RX ORDER — CLOPIDOGREL BISULFATE 75 MG/1
75 TABLET ORAL DAILY
Status: DISCONTINUED | OUTPATIENT
Start: 2023-11-21 | End: 2023-11-20 | Stop reason: HOSPADM

## 2023-11-20 RX ADMIN — SENNOSIDES AND DOCUSATE SODIUM 2 TABLET: 8.6; 5 TABLET ORAL at 08:30

## 2023-11-20 RX ADMIN — ASPIRIN 81 MG: 81 TABLET, COATED ORAL at 08:29

## 2023-11-20 RX ADMIN — CLOPIDOGREL BISULFATE 75 MG: 75 TABLET ORAL at 08:29

## 2023-11-20 ASSESSMENT — ACTIVITIES OF DAILY LIVING (ADL)
ADLS_ACUITY_SCORE: 33
ADLS_ACUITY_SCORE: 37
ADLS_ACUITY_SCORE: 33
ADLS_ACUITY_SCORE: 33

## 2023-11-20 ASSESSMENT — VISUAL ACUITY
OS: OTHER (SEE COMMENTS)
OU: OTHER (SEE COMMENTS)
OD: OTHER (SEE COMMENTS)
OS: OTHER (SEE COMMENTS)
OD: OTHER (SEE COMMENTS)
OU: OTHER (SEE COMMENTS)
OD: OTHER (SEE COMMENTS)
OU: OTHER (SEE COMMENTS)
OS: OTHER (SEE COMMENTS)

## 2023-11-20 NOTE — CONSULTS
[facility]    Stroke Consult Note    Reason for Consult:  TIA    Chief Complaint: Loss of Vision       HPI  Yasmin Larry is a 83 year old female with pertinent past medical history of HLD, HTN, prediabetes, prior TIA with transient left eye vision loss 2013, cervical dystocia, prior CVA, cataracts s/p extraction with IOL each eye, HELENA, hypothyroidism.    She presented to [facility] emergency department 11/19/2023 due to a transient 2-hour episode of complete vision loss to the right eye.  She had associated mild blurring to the left eye.  No other focal deficits.  /101.    She is seen today via telemedicine video exam for inpatient consult.  She describes that when she got up yesterday she felt some pain to her right eye.  At [place] when she attempted to look at the screen on the wall she could not read it.  She covered the left eye and could not see anything out of the right eye it was just blank.  She tried to cover her right eye and could see some blurriness out of the left eye but still was unable to read the screen.  She called the triage RN at [facility] who directed her to present to the emergency department for further evaluation.  She felt like her symptoms resolved approximately 3-4 PM she knows she could see the TV with both of her eyes.  She does endorse that her eyes have been watering a lot and has been having increased fatigue over the past week.  He has had cataract surgery in the past prior bleeding vessel in the eye for which she received injections for couple of years.  All of those visual symptoms had resolved but does use glasses for driving.    We discussed her symptoms and that usually complete loss of vision to 1 eye would be concerning for anterior circulation clotting blockage; however, with some bilateral eye symptoms and presence of basilar stenosis symptoms may have been caused by posterior circulation blockage.  Discussed that most likely  atherosclerotic etiology but will rule out atrial fibrillation that could lead to cardioembolic strokes.  Reviewed vascular risk factors.  She has a blood pressure monitor at home.  She denies any history of smoking or alcohol use.  She uses a CPAP nightly for sleep apnea.  No fevers, night sweats, or unintentional weight loss.  She has tried multiple statin medications and most recently ezetimibe but stopped taking it 3 days ago due to severe leg pain with all of these medications.  Recommend that she try PCSK9 inhibitor, will place referral to vascular medicine.      TIA Evaluation Summarized    MRI and/or Head CT MRI: Negative for acute pathology, mild chronic SVID, moderate atrophy   Intracranial Vasculature CTA head: Redemonstration of short segment marked narrowing proximal basilar artery with attenuated mid-- distal basilar artery and to some degree posterior circulation as well, possibly due to thromboembolic event  MRA head: Probable short segment marked narrowing proximal basilar artery with attenuated opacification and decreased lumen of noted-distal basilar artery, distal vertebral arteries and to some degree posterior circulation as well   Cervical Vasculature CTA neck: Attenuated and narrowed distal aspects of bilateral vertebral arteries  MRA neck: Normal     Echocardiogram TTE: LV normal, EF 60-65%, no WMA, RV normal, normal bilateral atria size, no color Doppler evidence of atrial shunt, mild trileaflet aortic sclerosis, trace AR, SR   EKG/Telemetry Sinus rhythm, anteroseptal infarct cited on or before 6/23/2014   Other Testing ESR 19  CRP 4.08     LDL 11/19/2023: 131 mg/dL   A1C 11/19/2023: 6.0 %       ABCD2 Patients Score   Age ? 60 years 1 point 1   Blood Pressure    SBP ? 140 or DBP ?  90    1 point 1   Clinical Features    - Unilateral weakness    - Speech disturbance w/o weakness    - Other    2 points  1 point    0 points 0   Duration of symptoms    ? 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 concern for intracranial atherosclerotic stenosis (ICAD) versus less likely thromboembolism of undetermined source, rule out atrial fibrillation, ESR/CRP normal, rule out intraocular pathology    Recommendations   Acute Management:  -Neuro checks and vitals every 4 hours  - Inpatient SBP goal <180, avoid hypotension or sudden drops in blood pressure with slow titration over the next several weeks to normotensive goal  - mg daily x 90 days then decrease to PTA ASA 81 mg daily indefinitely  -Plavix 75 mg daily x 90 days  -Recommend PCSK9 inhibitor to help lower cholesterol (referral placed to vascular medicine)  -telemetry, 14-day Zio patch x 2 per patient preference as opposed to CardioNet (ordered)   -PT/OT/SPT, Dysphagia screen  -Euthermia, euglycemia, eunatremia  -Stroke Education  -Stroke Class per [facility])    Stroke prevention:  -follow-up with PCP for titration to goal LDL 40-70, <40 increases risk of Intracranial hemorrhage  -Mediterranean diet can be beneficial for overall decreased cardiovascular risk, please print hand out for patient at discharge   -goal HgbA1c <7% for stroke prevention, follow-up with PCP  -long term outpatient blood pressure goal <130/80 to be achieved slowly over the next several weeks, recommend home monitoring twice daily in AM and PM, keep log and bring to PCP follow-up  - Call our stroke clinic with any questions or concerns at [phone], or send a [facility] advice message  - Call [phone] with any new stroke symptoms    B - Balance problems  E - Eyes  "(vision loss)  F - Facial weakness or droop  A - Arm weakness  S - Speech/language  T - Time is brain!    Discussed with vascular neurology attending, Dr. Lee    Patient Follow-up    -Follow-up with PCP in 1-2 weeks  -Follow-up with vascular medicine for PCSK9 inhibitor to help lower cholesterol  -Follow-up urgently with ophthalmology in 3-4 days to evaluate for intraocular pathology (ordered)  -repeat CTA head/neck in 6 weeks (ordered)  -Follow-up with neurology team in 6-8 weeks (ordered)    Thank you for this consult. No further stroke evaluation is recommended, so we will sign off. Please contact us with any additional questions.    Alana Macdonald PA-C  Vascular Neurology    To page me or covering stroke neurology team member, click here: [facility]  Choose \"On Call\" tab at top, then select \"NEUROLOGY/ALL SITES\" from middle drop-down box, press Enter, then look for \"stroke\" or \"telestroke\" for your site.  _____________________________________________________    Clinically Significant Risk Factors Present on Admission                # Drug Induced Platelet Defect: home medication list includes an antiplatelet medication   # Hypertension: Home medication list includes antihypertensive(s)      # Overweight: Estimated body mass index is 26.1 kg/m  as calculated from the following:    Height as of this encounter: 1.499 m (4' 11\").    Weight as of this encounter: 58.6 kg (129 lb 3.2 oz).                Past Medical History    Past Medical History:   Diagnosis Date    Arthritis     back    Cerebral artery occlusion with cerebral infarction (H)     Hypertension     Hypothyroid     Other chronic pain     left lower leg     PONV (postoperative nausea and vomiting)     Sleep apnea      Medications   Home Meds  Prior to Admission medications    Medication Sig Start Date End Date Taking? Authorizing Provider   Acetaminophen (TYLENOL PO) Take 500-1,000 mg by mouth 3 times daily as needed for mild pain or fever    Reported, " Patient   AMLODIPINE BESYLATE PO Take 5 mg by mouth At Bedtime    Reported, Patient   Ascorbic Acid (VITAMIN C PO) Take 1 tablet by mouth daily    Reported, Patient   ASPIRIN PO Take 81 mg by mouth At Bedtime    Reported, Patient   Calcium Citrate-Vitamin D (CALCIUM CITRATE + D PO) Take 1 tablet by mouth 2 times daily    Reported, Patient   clobetasol (TEMOVATE) 0.05 % external ointment Apply topically.  Use daily during a flare, apply twice weekly between flares for prevention. 10/4/22   Lindsay Simpson MD   conjugated estrogens (PREMARIN) 0.625 MG/GM vaginal cream Place 0.5 g vaginally twice a week 1/9/23   Lindsay Simpson MD   Cyanocobalamin (B-12 DOTS PO) Take 1 capsule by mouth daily    Reported, Patient   GABAPENTIN PO Take 100 mg by mouth    Reported, Patient   LEVOTHYROXINE SODIUM PO Take 88 mcg by mouth every morning     Reported, Patient   LOSARTAN POTASSIUM PO Take 50 mg by mouth every morning    Reported, Patient   Multiple Vitamins-Minerals (MULTIVITAMIN ADULTS PO)     Reported, Patient   NADOLOL PO Take 10 mg by mouth every morning (0.5 x 20 mg tablet = 10 mg dose)    Reported, Patient   Omega-3 Fatty Acids (FISH OIL PO) Take 1 g by mouth daily    Reported, Patient   OMEPRAZOLE PO Take 20 mg by mouth every other day     Reported, Patient   pravastatin (PRAVACHOL) 40 MG tablet Take 40 mg by mouth daily    Reported, Patient   traZODone (DESYREL) 50 MG tablet Take 50 mg by mouth At Bedtime.    Unknown, Entered By History       Scheduled Meds   aspirin  81 mg Oral Daily    Or    aspirin  81 mg Oral or NG Tube Daily    atorvastatin  40 mg Oral or NG Tube Daily at 8 pm    clopidogrel  75 mg Oral or NG Tube Daily    senna-docusate  1 tablet Oral BID    Or    senna-docusate  2 tablet Oral BID    traZODone  50 mg Oral At Bedtime       Infusion Meds      Allergies   No Known Allergies       PHYSICAL EXAMINATION   Temp:  [97.3  F (36.3  C)-97.7  F (36.5  C)] 97.3  F (36.3  C)  Pulse:  [68-73]  73  Resp:  [18] 18  BP: (137-175)/(68-86) 148/75  SpO2:  [91 %-96 %] 91 %    General Exam  General:  patient lying in bed without any acute distress    HEENT:  normocephalic/atraumatic  Pulmonary:  no respiratory distress    Neuro Exam  Mental Status:  alert, oriented x 3, follows commands, speech clear and fluent, naming and repetition normal  Cranial Nerves:  visual fields intact (tested by nurse), EOMI with normal smooth pursuit, facial sensation intact and symmetric (tested by nurse), facial movements symmetric, hearing not formally tested but intact to conversation, no dysarthria, tongue protrusion midline  Motor:  no abnormal movements, able to move all limbs antigravity spontaneously with no signs of hemiparesis observed, no pronator drift   Reflexes:  unable to test (telestroke)  Sensory:  light touch sensation intact and symmetric throughout upper and lower extremities (assessed by nurse), no extinction on double simultaneous stimulation (assessed by nurse)  Coordination:  normal finger-to-nose and heel-to-shin bilaterally without dysmetria  Station/Gait:  unable to test due to telestroke    Stroke Scales    NIHSS  1a. Level of Consciousness 0-->Alert, keenly responsive   1b. LOC Questions 0-->Answers both questions correctly   1c. LOC Commands 0-->Performs both tasks correctly   2.   Best Gaze 0-->Normal   3.   Visual 0-->No visual loss   4.   Facial Palsy 0-->Normal symmetrical movements   5a. Motor Arm, Left 0-->No drift, limb holds 90 (or 45) degrees for full 10 secs   5b. Motor Arm, Right 0-->No drift, limb holds 90 (or 45) degrees for full 10 secs   6a. Motor Leg, Left 0-->No drift, leg holds 30 degree position for full 5 secs   6b. Motor Leg, right 0-->No drift, leg holds 30 degree position for full 5 secs   7.   Limb Ataxia 0-->Absent   8.   Sensory 0-->Normal, no sensory loss   9.   Best Language 0-->No aphasia, normal   10. Dysarthria 0-->Normal   11. Extinction and Inattention  0-->No abnormality  "  Total 0 (11/20/23 1409)       Imaging  I personally reviewed all imaging; relevant findings per HPI.    Labs Data   CBC  Recent Labs   Lab 11/19/23  1359   WBC 8.0   RBC 4.93   HGB 14.4   HCT 43.7        Basic Metabolic Panel   Recent Labs   Lab 11/20/23  1238 11/19/23  1359 11/19/23  1353   NA  --  136  --    POTASSIUM  --  3.8  --    CHLORIDE  --  99  --    CO2  --  26  --    BUN  --  13.5  --    CR  --  0.74  --    * 144* 155*   HUMA  --  9.2  --      Liver Panel  No results for input(s): \"PROTTOTAL\", \"ALBUMIN\", \"BILITOTAL\", \"ALKPHOS\", \"AST\", \"ALT\", \"BILIDIRECT\" in the last 168 hours.  INR    Recent Labs   Lab Test 11/19/23  1359   INR 0.93           Stroke Consult Data Data   Telestroke Service Details  (for non-emergent stroke consult with tele)  Video start time 11/20/23   1407   Video end time 11/20/23   1455   Type of service telemedicine diagnostic assessment of acute neurological changes   Reason telemedicine is appropriate patient requires assessment with a specialist for diagnosis and treatment of neurological symptoms   Mode of transmission secure interactive audio and video communication per Danni   Originating site (patient location) [facility]    Distant site (provider location) [facility]       I have personally spent a total of 70 minutes providing care today, time spent in reviewing medical records and reviewing tests, examining the patient and obtaining history, coordination of care, and discussion with the patient and/or family regarding diagnostic results, prognosis, symptom management, risks and benefits of management options, and development of plan of care. Greater than 50% was spent in counseling and coordination of care.     *All or a portion of this note was generated using voice recognition software and may contain transcription errors.  "

## 2023-11-20 NOTE — PLAN OF CARE
PRIMARY DIAGNOSIS: SYNCOPE/TIA  OUTPATIENT/OBSERVATION GOALS TO BE MET BEFORE DISCHARGE:  1. Orthostatic performed: No    2. Diagnostic testing complete & at baseline neurologic testing: No    3. Cleared by consultants (if involved): No    4. Interpretation of cardiac rhythm per telemetry tech: SR 77    5. Tolerating adequate PO diet and medications: Yes    6. Return to near baseline physical activity or neurologic status: Yes    Discharge Planner Nurse   Safe discharge environment identified: Yes  Barriers to discharge: Yes       Entered by: Areli Barber RN 11/20/2023    Pt awake, A&Ox4, SBA - no devices, PIV SL, cardiac diet, on tele. Pt states no pain or stroke symptoms since arrival to ER. Neuros currently intact.   ECHO complete.Waiting for tele stroke consult.   Hopeful discharge today.     Please review provider order for any additional goals.   Nurse to notify provider when observation goals have been met and patient is ready for discharge.

## 2023-11-20 NOTE — PROGRESS NOTES
Acknowledged SLP order, reviewed chart and consulted with rounding care team. Patient's MRI negative for acute CVA, suspected TIA impacting vision.  Patient passed a nursing dysphagia screen and was advanced to a regular oral diet.  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      A 4/2023 esophagram indicated dysmotility, thus, recommend upright positioning during and after intake, slow rate of intake, and possibly alternating liquids and solids.      Will complete current SLP order without acute swallowing needs known.  Defer discharge recommendations to care team otherwise.

## 2023-11-20 NOTE — DISCHARGE SUMMARY
[facility] Discharge Summary          Yasmin Larry MRN# [id number]   Age: 83 year old YOB: 1940     Date of Admission:  11/19/2023  Date of Discharge::  11/20/2023  5:35 PM  Admitting Physician:  Servando Vogt DO  Discharge Physician:  Kecia Guerrero PA-C  Primary Physician: Nati Cruz     Primary Discharge Diagnoses:   Transient complete vision loss to R eye is concerning for amaurosis fugax (anterior circulation); however, also complained of L eye partial vision loss/blurring as well so potentially posterior circulation TIA     Hospital Course:   For detail history, please refer to H & P from 11/19/2023. In brief, this is a 83 year old female with pertinent past medical history of HLD, HTN, prediabetes, prior TIA with transient left eye vision loss 2013, cervical dystocia, prior CVA, cataracts s/p extraction with IOL each eye, HELENA, hypothyroidism.     She presented to [facility] emergency department 11/19/2023 due to a transient 2-hour episode of complete vision loss to the right eye.  She had associated mild blurring to the left eye.  No other focal deficits.      TIA Evaluation Summarized     MRI and/or Head CT MRI: Negative for acute pathology, mild chronic SVID, moderate atrophy   Intracranial Vasculature CTA head: Redemonstration of short segment marked narrowing proximal basilar artery with attenuated mid-- distal basilar artery and to some degree posterior circulation as well, possibly due to thromboembolic event  MRA head: Probable short segment marked narrowing proximal basilar artery with attenuated opacification and decreased lumen of noted-distal basilar artery, distal vertebral arteries and to some degree posterior circulation as well   Cervical Vasculature CTA neck: Attenuated and narrowed distal aspects of bilateral vertebral arteries  MRA neck: Normal      Echocardiogram TTE: LV normal, EF 60-65%, no WMA, RV normal, normal bilateral atria size, no color  Doppler evidence of atrial shunt, mild trileaflet aortic sclerosis, trace AR, SR   EKG/Telemetry Sinus rhythm, anteroseptal infarct cited on or before 6/23/2014   Other Testing ESR 19  CRP 4.08      LDL 11/19/2023: 131 mg/dL   A1C 11/19/2023: 6.0 %       Patient was seen by Neurology   - started on  mg daily x 90 days then decrease to PTA ASA 81 mg daily indefinitely.    - started on Plavix 75 mg daily x 90 days.    - Recommend PCSK9 inhibitor to help lower cholesterol (referral placed to vascular medicine)  - pt reports she is intolerant to statins  - telemetry, 14-day Zio patch x 2 per patient preference as opposed to CardioNet   - Follow-up with PCP in 1-2 weeks  - Follow-up with vascular medicine for PCSK9 inhibitor to help lower cholesterol  - Follow-up urgently with ophthalmology in 3-4 days to evaluate for intraocular pathology   - repeat CTA head/neck in 6 weeks   - Follow-up with neurology team in 6-8 weeks     Procedures/Imaging:     Results for orders placed or performed during the hospital encounter of 11/19/23   MR Brain w/o & w Contrast    Narrative    EXAM: MR BRAIN W/O and W CONTRAST, MRA NECK (CAROTIDS) W/O and W CONTRAST, MRA BRAIN ([place]) W/O CONTRAST  LOCATION: [facility]  DATE: 11/19/2023    INDICATION: right eye vision changes loss, transient  COMPARISON: None.  CONTRAST: 10mL Gadavist  TECHNIQUE:   1) Routine multiplanar multisequence head MRI without and with intravenous contrast.  2) 3D time-of-flight head MRA without intravenous contrast.  3) Neck MRA without and with IV contrast. Stenosis measurements made according to NASCET criteria unless otherwise specified.    FINDINGS:  HEAD MRI:  INTRACRANIAL CONTENTS: No acute or subacute infarct. No mass, acute hemorrhage, or extra-axial fluid collections. Scattered nonspecific T2/FLAIR hyperintensities within the cerebral white matter most consistent with mild chronic microvascular ischemic   change.  Moderate generalized cerebral atrophy. No hydrocephalus. Normal position of the cerebellar tonsils. No pathologic contrast enhancement.    SELLA: No abnormality accounting for technique.    OSSEOUS STRUCTURES/SOFT TISSUES: Normal marrow signal. The major intracranial vascular flow voids are maintained. Nonvisualization of normal flow void within distal vertebral arteries and proximal basilar artery.    ORBITS: No abnormality accounting for technique.     SINUSES/MASTOIDS: No paranasal sinus mucosal disease. No middle ear or mastoid effusion.     HEAD MRA:   ANTERIOR CIRCULATION: No stenosis/occlusion, aneurysm, or high flow vascular malformation. Well-developed right posterior communicating artery.    POSTERIOR CIRCULATION: Probable short segment marked narrowing proximal basilar artery with attenuated opacification and decreased lumen of mid to distal basilar artery and to some degree posterior circulation as well. Dominant right vertebral artery.     NECK MRA:   RIGHT CAROTID: No measurable stenosis or dissection.    LEFT CAROTID: No measurable stenosis or dissection.    VERTEBRAL ARTERIES: No focal stenosis or dissection. Dominant right and smaller left vertebral arteries.    AORTIC ARCH: Classic aortic arch anatomy with no significant stenosis at the origin of the great vessels.      Impression    IMPRESSION:  HEAD MRI:  1.  No acute infarct, mass, mass effect, or hemorrhage.  2.  Mild chronic small vessel ischemia.  3.  Moderate atrophy.    HEAD MRA:  1.  Probable short segment marked narrowing proximal basilar artery with attenuated opacification and decreased lumen of mid to distal basilar artery, distal vertebral arteries, and to some degree posterior circulation as well. CTA neck recommended in   further evaluation.  2.  Intracranial circulation appears otherwise normal.    NECK MRA:  Normal neck MRA.    Results were called to Dr. AGRAWAL at 11/19/2023 5:55 PM CST.    MRA Brain ([place]) wo  Contrast    Narrative    EXAM: MR BRAIN W/O and W CONTRAST, MRA NECK (CAROTIDS) W/O and W CONTRAST, MRA BRAIN ([place]) W/O CONTRAST  LOCATION: [facility]  DATE: 11/19/2023    INDICATION: right eye vision changes loss, transient  COMPARISON: None.  CONTRAST: 10mL Gadavist  TECHNIQUE:   1) Routine multiplanar multisequence head MRI without and with intravenous contrast.  2) 3D time-of-flight head MRA without intravenous contrast.  3) Neck MRA without and with IV contrast. Stenosis measurements made according to NASCET criteria unless otherwise specified.    FINDINGS:  HEAD MRI:  INTRACRANIAL CONTENTS: No acute or subacute infarct. No mass, acute hemorrhage, or extra-axial fluid collections. Scattered nonspecific T2/FLAIR hyperintensities within the cerebral white matter most consistent with mild chronic microvascular ischemic   change. Moderate generalized cerebral atrophy. No hydrocephalus. Normal position of the cerebellar tonsils. No pathologic contrast enhancement.    SELLA: No abnormality accounting for technique.    OSSEOUS STRUCTURES/SOFT TISSUES: Normal marrow signal. The major intracranial vascular flow voids are maintained. Nonvisualization of normal flow void within distal vertebral arteries and proximal basilar artery.    ORBITS: No abnormality accounting for technique.     SINUSES/MASTOIDS: No paranasal sinus mucosal disease. No middle ear or mastoid effusion.     HEAD MRA:   ANTERIOR CIRCULATION: No stenosis/occlusion, aneurysm, or high flow vascular malformation. Well-developed right posterior communicating artery.    POSTERIOR CIRCULATION: Probable short segment marked narrowing proximal basilar artery with attenuated opacification and decreased lumen of mid to distal basilar artery and to some degree posterior circulation as well. Dominant right vertebral artery.     NECK MRA:   RIGHT CAROTID: No measurable stenosis or dissection.    LEFT CAROTID: No measurable  stenosis or dissection.    VERTEBRAL ARTERIES: No focal stenosis or dissection. Dominant right and smaller left vertebral arteries.    AORTIC ARCH: Classic aortic arch anatomy with no significant stenosis at the origin of the great vessels.      Impression    IMPRESSION:  HEAD MRI:  1.  No acute infarct, mass, mass effect, or hemorrhage.  2.  Mild chronic small vessel ischemia.  3.  Moderate atrophy.    HEAD MRA:  1.  Probable short segment marked narrowing proximal basilar artery with attenuated opacification and decreased lumen of mid to distal basilar artery, distal vertebral arteries, and to some degree posterior circulation as well. CTA neck recommended in   further evaluation.  2.  Intracranial circulation appears otherwise normal.    NECK MRA:  Normal neck MRA.    Results were called to Dr. AGRAWAL at 11/19/2023 5:55 PM CST.    MRA Neck (Carotids) wo & w Contrast    Narrative    EXAM: MR BRAIN W/O and W CONTRAST, MRA NECK (CAROTIDS) W/O and W CONTRAST, MRA BRAIN ([place]) W/O CONTRAST  LOCATION: [facility]  DATE: 11/19/2023    INDICATION: right eye vision changes loss, transient  COMPARISON: None.  CONTRAST: 10mL Gadavist  TECHNIQUE:   1) Routine multiplanar multisequence head MRI without and with intravenous contrast.  2) 3D time-of-flight head MRA without intravenous contrast.  3) Neck MRA without and with IV contrast. Stenosis measurements made according to NASCET criteria unless otherwise specified.    FINDINGS:  HEAD MRI:  INTRACRANIAL CONTENTS: No acute or subacute infarct. No mass, acute hemorrhage, or extra-axial fluid collections. Scattered nonspecific T2/FLAIR hyperintensities within the cerebral white matter most consistent with mild chronic microvascular ischemic   change. Moderate generalized cerebral atrophy. No hydrocephalus. Normal position of the cerebellar tonsils. No pathologic contrast enhancement.    SELLA: No abnormality accounting for  technique.    OSSEOUS STRUCTURES/SOFT TISSUES: Normal marrow signal. The major intracranial vascular flow voids are maintained. Nonvisualization of normal flow void within distal vertebral arteries and proximal basilar artery.    ORBITS: No abnormality accounting for technique.     SINUSES/MASTOIDS: No paranasal sinus mucosal disease. No middle ear or mastoid effusion.     HEAD MRA:   ANTERIOR CIRCULATION: No stenosis/occlusion, aneurysm, or high flow vascular malformation. Well-developed right posterior communicating artery.    POSTERIOR CIRCULATION: Probable short segment marked narrowing proximal basilar artery with attenuated opacification and decreased lumen of mid to distal basilar artery and to some degree posterior circulation as well. Dominant right vertebral artery.     NECK MRA:   RIGHT CAROTID: No measurable stenosis or dissection.    LEFT CAROTID: No measurable stenosis or dissection.    VERTEBRAL ARTERIES: No focal stenosis or dissection. Dominant right and smaller left vertebral arteries.    AORTIC ARCH: Classic aortic arch anatomy with no significant stenosis at the origin of the great vessels.      Impression    IMPRESSION:  HEAD MRI:  1.  No acute infarct, mass, mass effect, or hemorrhage.  2.  Mild chronic small vessel ischemia.  3.  Moderate atrophy.    HEAD MRA:  1.  Probable short segment marked narrowing proximal basilar artery with attenuated opacification and decreased lumen of mid to distal basilar artery, distal vertebral arteries, and to some degree posterior circulation as well. CTA neck recommended in   further evaluation.  2.  Intracranial circulation appears otherwise normal.    NECK MRA:  Normal neck MRA.    Results were called to Dr. AGRAWAL at 11/19/2023 5:55 PM CST.    CTA Head Neck with Contrast    Narrative    EXAM: CTA HEAD NECK W CONTRAST  LOCATION: [facility]  DATE: 11/19/2023    INDICATION: R eye vision abnormality  COMPARISON: None.  CONTRAST: 67mL  Isovue 370  TECHNIQUE: Head and neck CT angiogram with IV contrast. Axial helical CT images of the head and neck vessels obtained during the arterial phase of intravenous contrast administration. Axial 2D reconstructed images and multiplanar 3D MIP reconstructed   images of the head and neck vessels were performed by the technologist. Dose reduction techniques were used. All stenosis measurements made according to NASCET criteria unless otherwise specified.    FINDINGS:   HEAD CTA:  ANTERIOR CIRCULATION: No stenosis/occlusion, aneurysm, or high flow vascular malformation. Well-developed right posterior communicating artery.    POSTERIOR CIRCULATION: Redemonstration of short segment marked narrowing proximal basilar artery with attenuated mid to distal aspect of basilar artery and to some degree posterior circulation as well. Attenuated and narrowed distal aspects of both   vertebral arteries. Findings could be due to a thromboembolic event. Balanced vertebral arteries supply a normal basilar artery.     DURAL VENOUS SINUSES: Expected enhancement of the major dural venous sinuses.    NECK CTA:  RIGHT CAROTID: No measurable stenosis or dissection.    LEFT CAROTID: No measurable stenosis or dissection.    VERTEBRAL ARTERIES: Attenuated and narrowed distal aspects of both vertebral arteries. Balanced vertebral arteries.    AORTIC ARCH: Classic aortic arch anatomy with no significant stenosis at the origin of the great vessels.    NONVASCULAR STRUCTURES: Unremarkable.      Impression    IMPRESSION:   HEAD CTA:  1.  Redemonstration of short segment marked narrowing proximal basilar artery with attenuated mid to distal aspect of basilar artery and to some degree posterior circulation as well. This could be due to a thromboembolic event.  2.  Attenuated and narrowed distal aspects of both vertebral arteries.  3.  Remainder of intracranial circulation appears normal.    NECK CTA:  1.  No hemodynamically significant narrowing  in either ICA.  2.  Attenuated and narrowed distal aspects of both vertebral arteries.    Results were called to Dr. AGRAWAL at 2023 7:54 PM CST.     Echocardiogram Complete     Value    LVEF  60-65%    [facility]    [id number]  [id number]  [id number]  [id number]^[email or web address]^[email or web address]^ALEX     [facility]  Echocardiography Laboratory  [address] [place] [address]     Name: HEAVEN CHEATHAM  MRN: [id number]  : 1940  Study Date: 2023 08:48 AM  Age: 83 yrs  Gender: Female  Patient Location: [facility]  Reason For Study: TIA  Ordering Physician: ROCAEL NOE  Performed By: RANJANA Blair     BSA: 1.5 m2  Height: 59 in  Weight: 129 lb  HR: 65  BP: 166/72 mmHg  ______________________________________________________________________________  Procedure  Complete Portable Echo Adult.  ______________________________________________________________________________  Interpretation Summary     The left ventricle is normal in structure, function and size.  The visual ejection fraction is 60-65%.  The right ventricle is normal in structure, function and size.  There is trace aortic regurgitation.  ______________________________________________________________________________  Left Ventricle  The left ventricle is normal in structure, function and size. There is normal  left ventricular wall thickness. The visual ejection fraction is 60-65%. No  regional wall motion abnormalities noted.     Right Ventricle  The right ventricle is normal in structure, function and size.     Atria  Normal left atrial size. Right atrial size is normal. There is no color  Doppler evidence of an atrial shunt.     Mitral Valve  The mitral valve is normal in structure and function. There is trace mitral  regurgitation.     Tricuspid Valve  The tricuspid valve is normal in structure and function. There is trace  tricuspid regurgitation. The right ventricular systolic pressure is  approximated at 17.4 mmHg plus the  right atrial pressure.     Aortic Valve  There is mild trileaflet aortic sclerosis. There is trace aortic  regurgitation.     Pulmonic Valve  The pulmonic valve is not well seen, but is grossly normal.     Vessels  Normal size aorta. Normal size ascending aorta.     Pericardium  There is no pericardial effusion.     Rhythm  Sinus rhythm was noted.  ______________________________________________________________________________  MMode/2D Measurements & Calculations     IVSd: 1.2 cm  LVIDd: 3.6 cm  LVIDs: 1.9 cm  LVPWd: 0.77 cm  FS: 46.5 %  LV mass(C)d: 106.6 grams  LV mass(C)dI: 69.6 grams/m2  Ao root diam: 2.7 cm  LVOT diam: 1.9 cm  LVOT area: 2.8 cm2  Ao root diam index Ht(cm/m): 1.8  Ao root diam index BSA (cm/m2): 1.7  LA Volume (BP): 38.7 ml  LA Volume Index (BP): 25.3 ml/m2     RV Base: 2.9 cm  RWT: 0.43  TAPSE: 1.7 cm     Doppler Measurements & Calculations  MV E max suraj: 88.7 cm/sec  MV A max suraj: 119.0 cm/sec  MV E/A: 0.75  MV max P.6 mmHg  MV mean P.5 mmHg  MV V2 VTI: 31.2 cm  MVA(VTI): 1.6 cm2  MV dec time: 0.28 sec  Ao V2 max: 164.0 cm/sec  Ao max P.0 mmHg  Ao V2 mean: 109.0 cm/sec  Ao mean P.0 mmHg  Ao V2 VTI: 31.9 cm  LUIS(I,D): 1.6 cm2  LUIS(V,D): 1.5 cm2  LV V1 max PG: 3.2 mmHg  LV V1 max: 89.5 cm/sec  LV V1 VTI: 17.7 cm  SV(LVOT): 50.1 ml  SI(LVOT): 32.7 ml/m2  PA V2 max: 74.9 cm/sec  PA max P.2 mmHg  PA acc time: 0.08 sec  TR max suraj: 208.5 cm/sec  TR max P.4 mmHg  AV Suraj Ratio (DI): 0.55  LUIS Index (cm2/m2): 1.0  E/E' av.2     Lateral E/e': 18.5  Medial E/e': 19.9  RV S Suraj: 13.9 cm/sec     ______________________________________________________________________________  Report approved by: Yamilet Reis 2023 10:59 AM           Allergies:   No Known Allergies     Subjective:   Patient currently feels asymptomatic.  Feels vision is back to baseline.  Denies any weakness, numbness or tingling.    Physical Exam:   Blood pressure (!) 148/75, pulse 73, temperature  "97.3  F (36.3  C), temperature source Oral, resp. rate 18, height 1.499 m (4' 11\"), weight 58.6 kg (129 lb 3.2 oz), SpO2 91%, not currently breastfeeding.  General: Alert, interactive, NAD  HEENT: AT/NC  Resp: clear to auscultation bilaterally, no crackles or wheezes  Cardiac: regular rate and rhythm, no murmur  Abdomen: Soft, nontender, nondistended. +BS.  No rebound or guarding.  Extremities: No LE edema  Skin: Warm and dry, no jaundice or rash  Neuro: Alert & oriented x 3, no focal deficits, moves all extremities equally     Discharge Medicatios:        Discharge Medication List as of 11/20/2023  4:54 PM        START taking these medications    Details   clopidogrel (PLAVIX) 75 MG tablet 1 tablet (75 mg) by Oral or NG Tube route daily, Disp-90 tablet, R-0, E-Prescribe           CONTINUE these medications which have CHANGED    Details   aspirin (ASA) 325 MG EC tablet Take 1 tablet (325 mg) by mouth daily, Disp-90 tablet, R-0, E-Prescribe           CONTINUE these medications which have NOT CHANGED    Details   !! acetaminophen (ARTHRITIS PAIN APAP) 650 MG CR tablet Take 1,300 mg by mouth 2 times daily, Historical      !! acetaminophen (ARTHRITIS PAIN APAP) 650 MG CR tablet Take 650 mg by mouth daily as needed for mild pain or fever, Historical      amLODIPine (NORVASC) 5 MG tablet Take 5 mg by mouth at bedtime, Historical      Calcium Citrate-Vitamin D (CALCIUM CITRATE + D PO) Take 1 tablet by mouth 3 times daily (with meals), Historical      Cyanocobalamin (VITAMIN B-12 PO) Take 1 tablet by mouth daily, Historical      !! levothyroxine (SYNTHROID/LEVOTHROID) 100 MCG tablet Take 100 mcg by mouth five times a week, Historical      !! levothyroxine (SYNTHROID/LEVOTHROID) 112 MCG tablet Take 112 mcg by mouth twice a week (Wednesday and Sunday), Historical      losartan (COZAAR) 100 MG tablet Take 100 mg by mouth every morning, Historical      Multiple Vitamins-Minerals (MULTIVITAMIN ADULTS PO) Take 1 tablet by mouth " daily, Historical      omeprazole (PRILOSEC) 20 MG DR capsule Take 20 mg by mouth every morning, Historical      propranolol (INDERAL) 20 MG tablet Take 20 mg by mouth 2 times daily, Historical      Pyridoxine HCl (VITAMIN B-6 PO) Take 1 tablet by mouth daily, Historical      traZODone (DESYREL) 50 MG tablet Take 50-75 mg by mouth nightly as needed for sleep, Historical      clobetasol (TEMOVATE) 0.05 % external ointment Apply topically.  Use daily during a flare, apply twice weekly between flares for prevention.Disp-45 g, [id number]       !! - Potential duplicate medications found. Please discuss with provider.          Instructions Given to Patient as Discharge:     Discharge Procedure Orders   CTA Head Neck with Contrast   Standing Status: Future Standing Exp. Date: 11/20/24     Order Specific Question Answer Comments   Clinical Info for the Interpreting Provider 6 week follow-up on basilar stenosis    Priority Routine      Adult Eye  Referral   Standing Status: Future   Referral Priority: Urgent: 3-5 Days Referral Type: Consultation   Requested Specialty: Ophthalmology   Number of Visits Requested: 1     Vascular Medicine Referral   Standing Status: Future   Referral Priority: Priority: 1-2 Weeks Referral Type: Consultation   Requested Specialty: Cardiovascular Disease   Number of Visits Requested: 1     Reason for your hospital stay   Order Comments: You were hospitalized due to concern for a TIA.     Follow-up and recommended labs and tests    Order Comments: - start  mg daily x 90 days then decrease to PTA ASA 81 mg daily indefinitely  - start Plavix 75 mg daily x 90 days  - Recommend PCSK9 inhibitor to help lower cholesterol (referral placed to vascular medicine)  - Zio patch x 2 per patient preference as opposed to CardioNet (ordered)   - follow up with Stroke Class per [facility]  - follow-up with PCP for titration of cholesterol to goal LDL 40-70  - Mediterranean  diet can be beneficial for overall decreased cardiovascular risk    -long term outpatient blood pressure goal <130/80 to be achieved slowly over the next several weeks, recommend home monitoring twice daily in AM and PM, keep log and bring to PCP follow-up  - Call our stroke clinic with any questions or concerns at [phone], or send a [employer] medical advice message  - Call 911 with any new stroke symptoms     B - Balance problems  E - Eyes (vision loss)  F - Facial weakness or droop  A - Arm weakness  S - Speech/language  T - Time is brain!     Patient Follow-up    -Follow-up with PCP in 1-2 weeks  -Follow-up with vascular medicine for PCSK9 inhibitor to help lower cholesterol  -Follow-up urgently with ophthalmology in 3-4 days to evaluate for intraocular pathology (ordered)  -repeat CTA head/neck in 6 weeks (ordered)  -Follow-up with neurology team in 6-8 weeks (ordered)     Activity   Order Comments: Your activity upon discharge: activity as tolerated     Order Specific Question Answer Comments   Is discharge order? Yes      Adult Leadless EKG Monitor 8 to 14 Days   Standing Status: Future Standing Exp. Date: 11/20/24   Order Comments: E[place]se 2 of 2     Order Specific Question Answer Comments   Order completed for? Adult Cardiology    Schedule hook-up appt at [facility] [8]    Patient should wear the monitor for 14 days      Diet   Order Comments: Follow this diet upon discharge: Orders Placed This Encounter      Combination Diet Low Saturated Fat Na <2400mg Diet; Thin Liquids (level 0)     Order Specific Question Answer Comments   Is discharge order? Yes      Stroke Hospital Follow Up (for neurologist use only)   Standing Status: Future Standing Exp. Date: 05/20/25   Order Comments: [employer] will call you to coordinate care as prescribed by your provider. If you don t hear from a representative within 2 business days, please call [phone].       Order Specific Question Answer Comments   Schedule  Patient With: Any Stroke MARIELA    Specific Diagnosis: TIA    Follow up range in weeks (after discharge) 6-8    Contact: Patient    Scheduling Instructions: [employer] will call you to coordinate care as prescribed by your provider. If you don t hear from a representative within 2 business days, please call [phone].        Pending Tests at Discharge:   Cardiac monitor    Discharge Disposition:     Discharged to home     Kecia Guerrero MS, PA-C  Hospitalist Service  Pager [phone]    >30 minutes was spent in discharge planning, care coordination, physical examination and medication reconciliation on the date of discharge, 11/21/2023

## 2023-11-20 NOTE — CONSULTS
[facility]    Stroke Telephone Note    I was called by Peewee Pacheco on 11/19/23 regarding patient Yasmin Larry. The patient is a 83 year old woman with h/o HLD, HTN, pre diabetes, cervical dystonia, who had trasnient R eye complete vision loss for 2 hours earlier this morning. Was associated with mild blurring in the left eye. No other focal neurological symptoms. ED exam: was non focal with no neurological deficits    Vitals  BP: (!) 194/101   Pulse: 73   Resp: 18   Temp: 97.2  F (36.2  C)        Imaging Findings  MRI Brain: No acute ischemia/hemorrhage  MRA Head and Neck:  Severe stenosis of proximal basilar artery with attenuated opacification and mod stenosis of mid to distal basilar artery of b/l V4  CTA head and Neck: Severe stenosis of proximal basilar artery with attenuated opacification and mod stenosis of mid to distal basilar artery of b/l V4. Mild athero at the cervical CCA/ICA b/lly    Impression  Vision change concerning for TIA    Even though the transient complete right vision loss is more in keeping with amaurosis fugax and possibly an anterior circulation issue, her presentation is muddles by the fact that she also had some left eye blurriness as well simultaneously which even though does not seem like a field cut, we would want to err on the side of caution and consider that there was b/l eye involvement. Especially in light of the severe stenosis of basilar and mod stenoses of different areas of the posterior circulation, all likely secondary to atherosclerosis, will start her on DAPT of SAMMPRIS dosing. Additionally should rule out cardioembolic etiology.    Recommendations  - Neurochecks and Vital Signs every 4 hours   - Daily aspirin 325 mg for secondary stroke prevention  - Plavix (clopidogrel) 300 mg PO loading dose x 1  - Plavix (clopidogrel) 75 mg PO Daily  - Statin: per lipid profile  - MRI Brain with and without contrast  - TTE (with Bubble Study if  "age 60 yrs or less)  - 24-hour Telemetry  - Bedside Glucose Monitoring  - A1c, Lipid Panel  - PT/OT/SLP  - Stroke Education  - Euthermia, Euglycemia  - Goal BP: permissive hypertension for now; avoid hypotension    My recommendations are based on the information provided over the phone by Yasmin Larry's in-person providers. They are not intended to replace the clinical judgment of her in-person providers. I was not requested to personally see or examine the patient at this time.    My recommendations are based on the information provided over the phone by Yasmin Larry's in-person providers. They are not intended to replace the clinical judgment of her in-person providers. I was not requested to personally see or examine the patient at this time.    The Stroke Staff is Dr. Feliciano.    Flavio Magdaleno MD  Vascular Neurology Fellow    To page me or covering stroke neurology team member, click here: [facility]  Choose \"On Call\" tab at top, then select \"NEUROLOGY/ALL SITES\" from middle drop-down box, press Enter, then look for \"stroke\" or \"telestroke\" for your site.   "

## 2023-11-20 NOTE — PLAN OF CARE
PRIMARY DIAGNOSIS: SYNCOPE/TIA  OUTPATIENT/OBSERVATION GOALS TO BE MET BEFORE DISCHARGE:  1. Orthostatic performed: No    2. Diagnostic testing complete & at baseline neurologic testing: yes    3. Cleared by consultants (if involved): yes    4. Interpretation of cardiac rhythm per telemetry tech: SR 70s    5. Tolerating adequate PO diet and medications: Yes    6. Return to near baseline physical activity or neurologic status: Yes    Discharge Planner Nurse   Safe discharge environment identified: Yes  Barriers to discharge: no       Entered by: Areli Barber RN 11/20/2023    Pt A&Ox4, SBA - no devices, PIV SL, cardiac diet, on tele. Pt states no pain or stroke symptoms. Neuros remain intact - q4 hour checks.  at bedside.  ECHO and tele stroke consult complete. Awaiting ZIO patch placement and orders to discharge.       Please review provider order for any additional goals.   Nurse to notify provider when observation goals have been met and patient is ready for discharge.

## 2023-11-20 NOTE — PLAN OF CARE
"Patient's After Visit Summary was reviewed with patient and/or spouse.   Patient verbalized understanding of After Visit Summary, recommended follow up and was given an opportunity to ask questions.   Discharge medications sent home with patient/family: YES   Discharged with spouse      Pt well and VSS upon departure from room 213-2. All belongings, paperwork and prescriptions sent home with pt and spouse.     /68 (BP Location: Left arm)   Pulse 73   Temp 98.6  F (37  C) (Oral)   Resp 18   Ht 1.499 m (4' 11\")   Wt 58.6 kg (129 lb 3.2 oz)   SpO2 93%   BMI 26.10 kg/m      OBSERVATION patient END time: 1735      "

## 2023-11-20 NOTE — PHARMACY-ADMISSION MEDICATION HISTORY
Pharmacist Admission Medication History    Admission medication history is complete. The information provided in this note is only as accurate as the sources available at the time of the update.    Information Source(s): Patient and CareEverywhere/SureScripts via in-person    Pertinent Information: None    Changes made to PTA medication list:  Added:   Propranolol  Vit B6  Deleted:    Ascorbic acid  Gabapentin  Nadolol  Pravastatin (stopped taking ~3 months ago)  Premarin cream  Changed:   APAP prn -> BID  Calcium + vit D BID -> TID  Levothyroxine 88mcg daily -> 100mcg 5 days/week and 112mcg 2 days/week (W/Barone)    Allergies reviewed with patient and updates made in EHR: yes    Medication History Completed By: Nae Campa [facility] 11/20/2023 8:52 AM    PTA Med List   Medication Sig Last Dose    acetaminophen (ARTHRITIS PAIN APAP) 650 MG CR tablet Take 1,300 mg by mouth 2 times daily 11/19/2023    acetaminophen (ARTHRITIS PAIN APAP) 650 MG CR tablet Take 650 mg by mouth daily as needed for mild pain or fever Past Month at PRN    amLODIPine (NORVASC) 5 MG tablet Take 5 mg by mouth at bedtime 11/19/2023    aspirin 81 MG EC tablet Take 81 mg by mouth daily 11/18/2023 at HS    Calcium Citrate-Vitamin D (CALCIUM CITRATE + D PO) Take 1 tablet by mouth 3 times daily (with meals) 11/19/2023    Cyanocobalamin (VITAMIN B-12 PO) Take 1 tablet by mouth daily 11/19/2023    levothyroxine (SYNTHROID/LEVOTHROID) 100 MCG tablet Take 100 mcg by mouth five times a week 11/18/2023    levothyroxine (SYNTHROID/LEVOTHROID) 112 MCG tablet Take 112 mcg by mouth twice a week (Wednesday and Sunday) 11/19/2023    losartan (COZAAR) 100 MG tablet Take 100 mg by mouth every morning 11/19/2023    Multiple Vitamins-Minerals (MULTIVITAMIN ADULTS PO) Take 1 tablet by mouth daily 11/19/2023    omeprazole (PRILOSEC) 20 MG DR capsule Take 20 mg by mouth every morning 11/19/2023    propranolol (INDERAL) 20 MG tablet Take 20 mg by mouth 2 times daily  11/19/2023    Pyridoxine HCl (VITAMIN B-6 PO) Take 1 tablet by mouth daily 11/19/2023    traZODone (DESYREL) 50 MG tablet Take 50-75 mg by mouth nightly as needed for sleep Past Week at HS

## 2023-11-20 NOTE — ED NOTES
"[facility]  ED Nurse Handoff Report    ED Chief complaint: Loss of Vision  . ED Diagnosis:   Final diagnoses:   TIA (transient ischemic attack)       Allergies: No Known Allergies    Code Status: Full Code    Activity level - Baseline/Home:  independent.  Activity Level - Current:   standby.   Lift room needed: No.   Bariatric: No   Needed: No   Isolation: No.   Infection: Not Applicable.     Respiratory status: Room air    Vital Signs (within 30 minutes):   Vitals:    11/19/23 1351 11/19/23 2046 11/19/23 2047   BP: (!) 194/101 (!) 175/86    Pulse: 73 73    Resp: 18     Temp: 97.2  F (36.2  C)     TempSrc: Temporal     SpO2: 97%  94%       Cardiac Rhythm:  ,      Pain level:    Patient confused: No.   Patient Falls Risk: nonskid shoes/slippers when out of bed, patient and family education, and assistive device/personal items within reach.   Elimination Status: Has voided     Patient Report - Initial Complaint: Pt was at [employer] when she had vision change. Pt has hx of TIA several years ago. Pt reports vision impairment out of right eye. Takes baby aspirin daily. Reports her balance feels \"off\". BEFAST negative. MD in room for stroke evaluation.   Focused Assessment: Eye WDL: .WDL except (Able to see now from both eyes; reports decreased right eye vision earlier today and pain in her right eye this morning; transient floaters for the past few weeks; double vision one night. History of TIA.)      Abnormal Results:   Labs Ordered and Resulted from Time of ED Arrival to Time of ED Departure   BASIC METABOLIC PANEL - Abnormal       Result Value    Sodium 136      Potassium 3.8      Chloride 99      Carbon Dioxide (CO2) 26      Anion Gap 11      Urea Nitrogen 13.5      Creatinine 0.74      GFR Estimate 80      Calcium 9.2      Glucose 144 (*)    GLUCOSE BY METER - Abnormal    GLUCOSE BY METER POCT 155 (*)    INR - Normal    INR 0.93     PARTIAL THROMBOPLASTIN TIME - Normal    aPTT 30   "   GLUCOSE MONITOR NURSING POCT   CBC WITH PLATELETS AND DIFFERENTIAL    WBC Count 8.0      RBC Count 4.93      Hemoglobin 14.4      Hematocrit 43.7      MCV 89      MCH 29.2      MCHC 33.0      RDW 12.5      Platelet Count 254      % Neutrophils 63      % Lymphocytes 24      % Monocytes 10      % Eosinophils 2      % Basophils 0      % Immature Granulocytes 1      NRBCs per 100 WBC 0      Absolute Neutrophils 5.0      Absolute Lymphocytes 1.9      Absolute Monocytes 0.8      Absolute Eosinophils 0.2      Absolute Basophils 0.0      Absolute Immature Granulocytes 0.1      Absolute NRBCs 0.0          CTA Head Neck with Contrast   Final Result   IMPRESSION:    HEAD CTA:   1.  Redemonstration of short segment marked narrowing proximal basilar artery with attenuated mid to distal aspect of basilar artery and to some degree posterior circulation as well. This could be due to a thromboembolic event.   2.  Attenuated and narrowed distal aspects of both vertebral arteries.   3.  Remainder of intracranial circulation appears normal.      NECK CTA:   1.  No hemodynamically significant narrowing in either ICA.   2.  Attenuated and narrowed distal aspects of both vertebral arteries.      Results were called to Dr. AGRAWAL at 11/19/2023 7:54 PM CST.         MRA Neck (Carotids) wo & w Contrast   Final Result   IMPRESSION:   HEAD MRI:   1.  No acute infarct, mass, mass effect, or hemorrhage.   2.  Mild chronic small vessel ischemia.   3.  Moderate atrophy.      HEAD MRA:   1.  Probable short segment marked narrowing proximal basilar artery with attenuated opacification and decreased lumen of mid to distal basilar artery, distal vertebral arteries, and to some degree posterior circulation as well. CTA neck recommended in    further evaluation.   2.  Intracranial circulation appears otherwise normal.      NECK MRA:   Normal neck MRA.      Results were called to Dr. AGRAWAL at 11/19/2023 5:55 PM CST.       MR Brain w/o & w  Contrast   Final Result   IMPRESSION:   HEAD MRI:   1.  No acute infarct, mass, mass effect, or hemorrhage.   2.  Mild chronic small vessel ischemia.   3.  Moderate atrophy.      HEAD MRA:   1.  Probable short segment marked narrowing proximal basilar artery with attenuated opacification and decreased lumen of mid to distal basilar artery, distal vertebral arteries, and to some degree posterior circulation as well. CTA neck recommended in    further evaluation.   2.  Intracranial circulation appears otherwise normal.      NECK MRA:   Normal neck MRA.      Results were called to Dr. AGRAWAL at 11/19/2023 5:55 PM CST.       MRA Brain ([place]) wo Contrast   Final Result   IMPRESSION:   HEAD MRI:   1.  No acute infarct, mass, mass effect, or hemorrhage.   2.  Mild chronic small vessel ischemia.   3.  Moderate atrophy.      HEAD MRA:   1.  Probable short segment marked narrowing proximal basilar artery with attenuated opacification and decreased lumen of mid to distal basilar artery, distal vertebral arteries, and to some degree posterior circulation as well. CTA neck recommended in    further evaluation.   2.  Intracranial circulation appears otherwise normal.      NECK MRA:   Normal neck MRA.      Results were called to Dr. AGRAWAL at 11/19/2023 5:55 PM CST.           Treatments provided: see MAR  Family Comments:  at bedside  OBS brochure/video discussed/provided to patient:  Yes  ED Medications:   Medications   LORazepam (ATIVAN) tablet 1 mg (1 mg Oral $Given 11/19/23 1546)   gadobutrol (GADAVIST) injection 10 mL (10 mLs Intravenous $Given 11/19/23 1730)   iopamidol (ISOVUE-370) solution 500 mL (67 mLs Intravenous $Given 11/19/23 1854)   for CT scan flush use (80 mLs Intravenous $Given 11/19/23 1854)   aspirin (ASA) chewable tablet 324 mg (324 mg Oral $Given 11/19/23 2029)   clopidogrel (PLAVIX) tablet 300 mg (300 mg Oral $Given 11/19/23 2029)       Drips infusing:  No  For the majority of the  shift this patient was Green.   Interventions performed were n/a.    Sepsis treatment initiated: No    Cares/treatment/interventions/medications to be completed following ED care: see orders    ED Nurse Name: Izabel Lee RN  8:53 PM    RECEIVING UNIT ED HANDOFF REVIEW    Above ED Nurse Handoff Report was reviewed: Yes  Reviewed by: Jessica Arroyo RN on November 20, 2023 at 12:46 AM

## 2023-11-20 NOTE — PLAN OF CARE
[facility]    ED Boarding Nurse Handoff Addendum Report:    Date/time: 11/19/2023, 10:51 PM    Activity Level: standby    Fall Risk: Yes:  nonskid shoes/slippers when out of bed, patient and family education, and activity supervised    Active Infusions: N/A    Current Meds Due: HS meds    Current care needs: Telemetry monitoring, Q4h neuro checks    Oxygen requirements (liters/min and/or FiO2): N/A    Respiratory status: Room air    Vital signs (within last 30 minutes):    Vitals:    11/19/23 2047 11/19/23 2109 11/19/23 2119 11/19/23 2215   BP:  (!) 167/85  (!) 166/72   Pulse:    68   Resp:       Temp:       TempSrc:       SpO2: 94%  94% 96%       Focused assessment within last 30 minutes:    A&Ox4, VSS, Denied any blurry vision, Q4hrs neuro checks done, No deficit noted. On telemetry monitoring, SR 70's. PRN tylenol and scheduled trazodone given.     ED Boarding Nurse name: Erin Neely RN

## 2023-11-20 NOTE — PLAN OF CARE
ROOM # 213-2    Living Situation (if not independent, order SW consult): Home w/ spouse   [facility] name:  [facility]     Activity level at baseline: IND  Activity level on admit: SBA    Who will be transporting you at discharge: Bo, spouse     Patient registered to observation; given Patient Bill of Rights; given the opportunity to ask questions about observation status and their plan of care.  Patient has been oriented to the observation room, bathroom and call light is in place.    Discussed discharge goals and expectations with patient/family.

## 2023-11-20 NOTE — PLAN OF CARE
PRIMARY DIAGNOSIS: TIA  OUTPATIENT/OBSERVATION GOALS TO BE MET BEFORE DISCHARGE:  1. Orthostatic performed: No    2. Diagnostic testing complete & at baseline neurologic testing: Yes    3. Cleared by consultants (if involved): No    4. Interpretation of cardiac rhythm per telemetry tech: SR 60s    5. Tolerating adequate PO diet and medications: Yes    6. Return to near baseline physical activity or neurologic status: Yes    Discharge Planner Nurse   Safe discharge environment identified: Yes  Barriers to discharge: Yes       Entered by: Jessica Arroyo RN 11/20/2023 4:20 AM     Please review provider order for any additional goals.   Nurse to notify provider when observation goals have been met and patient is ready for discharge.    Vitals are Temp: 97.7  F (36.5  C) Temp src: Oral BP: 137/68 Pulse: 70   Resp: 18 SpO2: 91 %.  Patient is Alert and Oriented x4. They are SBA with no assistive devices .  Pt is a Cardiac diet.  They are denying pain.  Patient is Saline locked. Denies nausea/dizziness/SOB. Neuros intact. Plan is for neurostroke consult and echo in the morning.

## 2023-11-20 NOTE — PLAN OF CARE
PRIMARY DIAGNOSIS: SYNCOPE/TIA  OUTPATIENT/OBSERVATION GOALS TO BE MET BEFORE DISCHARGE:  1. Orthostatic performed: No    2. Diagnostic testing complete & at baseline neurologic testing: No    3. Cleared by consultants (if involved): No    4. Interpretation of cardiac rhythm per telemetry tech: SR 77    5. Tolerating adequate PO diet and medications: Yes    6. Return to near baseline physical activity or neurologic status: Yes    Discharge Planner Nurse   Safe discharge environment identified: Yes  Barriers to discharge: Yes       Entered by: Areli Barber RN 11/20/2023    Pt A&Ox4, SBA - no devices, PIV SL, cardiac diet, on tele. Pt states no pain or stroke symptoms. Neuros remain intact - q4 hour checks.  at bedside.  ECHO complete. tele stroke consult at 2.   Hopeful discharge today.     Please review provider order for any additional goals.   Nurse to notify provider when observation goals have been met and patient is ready for discharge.

## 2023-11-20 NOTE — H&P
[facility]    History and Physical - Hospitalist Service       Date of Admission:  11/19/2023    Assessment & Plan      Yasmin Larry is a 83 year old female with PMH of HTN, HLD, prior CVA, cataracts, s/p extraction with IOL each eye, HELENA, hypothyroidism in the past who presents to [facility] for evaluation of loss of vision while at [employer] this morning.  She was unable to read the lyrics on the monitor out of her right eye and blurring out of her left eye.  She endorses feeling off balance.  Initial BEFAST screen was negative and her symptoms have since resolved.  She is on a baby ASA daily.  Work up in the ED was concerning for basal artery stenosis (inconclusive on MR but definitive on CT angio).  No focal deficits and exam is overall reassuring.  Labs otherwise WNL.  She was given 324 mg of ASA in the ED (2030 hrs) and loaded with clopidogrel 300 mg (2030 hrs).    Being admitted for TIA work up .      Acute medical issues:  #Transient loss of R eye vision  #Basilar artery stenosis  #Possible TIA  #Hx of L occipital CVA 2013    Chronic medical issues:  #HTN  #HLD  #hypothyroidism  #HELENA      Plan:  -- Admit to Medicine  -- Neurostroke consult in the AM.  --  mg daily (was on ASA 81 mg) and Plavix 300 mg x 1 tonight followed by 75 mg daily.  -- Check FLP.  Change pravastatin to atorvastatin 40 mg.   -- Echo in the AM.  -- Check A1C and TSH  -- Reevaluate therapy need in the AM. Currently without deficits.  -- Permissive HTN for now.  Goal SBP < 220.  -- Telemetry monitoring.  Consider event monitor at discharge.  -- Further interventions pending the outcomes of the above.         Diet: Cardiac diet  DVT Prophylaxis: ASA and Plavix  Pinedo Catheter: Not present  Lines: None     Cardiac Monitoring: None  Code Status:  Full     Clinically Significant Risk Factors Present on Admission                # Drug Induced Platelet Defect: home medication list includes an antiplatelet medication   #  Hypertension: Home medication list includes antihypertensive(s)                 Disposition Plan  pending clinical course     Expected Discharge Date: 11/20/2023                The patient's care was discussed with the Bedside Nurse, Patient, and EM Team.    CINDY Flor [facility]  Hospitalist Service  [facility]  Securely message with [employer] (more info)  Text page via [facility] Paging/Directory     ______________________________________________________________________    Chief Complaint   Vision loss    History is obtained from the patient, electronic health record, and emergency department physician    History of Present Illness   Yasmin Larry is a 83 year old female with PMH of HTN, HLD, prior CVA, HELENA, hypothyroidism in the past who presents to [facility] for evaluation of loss of vision while at [employer].  She was unable to read the lyrics on the monitor out of her right eye.  She endorses feeling off balance.  Initial BEFAST screen was negative and her symptoms have since resolved.  She is on a baby ASA daily.  Work up in the ED was concerning for basal artery stenosis (inconclusive on MR but definitive on CT angio).  No focal deficits and exam is overall reassuring.  Labs otherwise WNL.  She was given 324 mg of ASA in the ED (2030 hrs) and loaded with clopidogrel 300 mg (2030 hrs).    Being admitted for TIA work up .        Neuro hx:  hx of prior CVA 2013 (L occipital lobe), cervical dystonia.  On baby ASA daily.    Last known normal time: 1015  Length of symptoms 2 hours  Imaging:  MRI Brain: No acute ischemia/hemorrhage  MRA Head and Neck:  Severe stenosis of proximal basilar artery with attenuated opacification and mod stenosis of mid to distal basilar artery of b/l V4  CTA head and Neck: Severe stenosis of proximal basilar artery with attenuated opacification and mod stenosis of mid to distal basilar artery of b/l V4. Mild athero at the cervical CCA/ICA b/lly    Prior CVA hx:  2013  MRI IMPRESSION:   1. Tiny recent infarct in the deep white matter of the left occipital   lobe possibly involving the optic radiations. No evidence for   intracranial hemorrhage.   2. Diffuse cerebral volume loss and cerebral white matter changes   consistent with chronic small vessel ischemic disease.     Past Medical History    Past Medical History:   Diagnosis Date    Arthritis     back    Cerebral artery occlusion with cerebral infarction (H)     Hypertension     Hypothyroid     Other chronic pain     left lower leg     PONV (postoperative nausea and vomiting)     Sleep apnea        Past Surgical History   Past Surgical History:   Procedure Laterality Date    CHOLECYSTECTOMY      CHOLECYSTECTOMY      CYSTOSCOPY, SLING TRANSVAGINAL N/A 1/3/2018    Procedure: CYSTOSCOPY, SLING TRANSVAGINAL;;  Surgeon: Robles López MD;  Location: [facility] OR    DAVINCI HYSTERECTOMY SUPRACERVICAL, SACROCOLPOPEXY, COMBINED N/A 1/3/2018    Procedure: COMBINED DAVINCI XI HYSTERECTOMY SUPRACERVICAL, SACROCOLPOPEXY;;  Surgeon: Robles López MD;  Location: [facility] OR    DAVINCI RECTOPEXY N/A 1/3/2018    Procedure: DAVINCI XI RECTOPEXY;;  Surgeon: Anusha Nicole MD;  Location: [facility] OR    DAVINCI SALPINGO-OOPHORECTOMY INCLUDING BILATERAL Bilateral 1/3/2018    Procedure: DAVINCI XI SALPINGO-OOPHORECTOMY INCLUDING BILATERAL;  DAVINCI XI SUPRACERVICAL HYSTERECTOMY, BILATERAL SALPINGO-OOPHORECTOMY, SACROCOLPOPEXY, TRANSVAGINAL SLING, CYSTOSCOPY (DR. ENGLISH);  DAVINCI XI VENTRAL RECTOPEXY, LAPAROSCOPIC EXTENSIVE LYSIS OF ADHESIONS (DR. NICOLE) ;  Surgeon: Robles López MD;  Location: [facility] OR    HERNIA REPAIR      ventral hernia    LAPAROSCOPIC LYSIS ADHESIONS N/A 1/3/2018    Procedure: LAPAROSCOPIC LYSIS ADHESIONS;;  Surgeon: Anusha Nicole MD;  Location: [facility]    NEPHRECTOMY         Prior to Admission Medications   Prior to Admission Medications   Prescriptions Last Dose Informant Patient Reported? Taking?   AMLODIPINE  BESYLATE PO  Self Yes No   Sig: Take 5 mg by mouth At Bedtime   ASPIRIN PO  Self Yes No   Sig: Take 81 mg by mouth At Bedtime   Acetaminophen (TYLENOL PO)  Self Yes No   Sig: Take 500-1,000 mg by mouth 3 times daily as needed for mild pain or fever   Ascorbic Acid (VITAMIN C PO)  Self Yes No   Sig: Take 1 tablet by mouth daily   Calcium Citrate-Vitamin D (CALCIUM CITRATE + D PO)  Self Yes No   Sig: Take 1 tablet by mouth 2 times daily   Cyanocobalamin (B-12 DOTS PO)  Self Yes No   Sig: Take 1 capsule by mouth daily   GABAPENTIN PO   Yes No   Sig: Take 100 mg by mouth   LEVOTHYROXINE SODIUM PO  Self Yes No   Sig: Take 88 mcg by mouth every morning    LOSARTAN POTASSIUM PO  Self Yes No   Sig: Take 50 mg by mouth every morning   Multiple Vitamins-Minerals (MULTIVITAMIN ADULTS PO)   Yes No   NADOLOL PO  Self Yes No   Sig: Take 10 mg by mouth every morning (0.5 x 20 mg tablet = 10 mg dose)   OMEPRAZOLE PO  Self Yes No   Sig: Take 20 mg by mouth every other day    Omega-3 Fatty Acids (FISH OIL PO)  Self Yes No   Sig: Take 1 g by mouth daily   clobetasol (TEMOVATE) 0.05 % external ointment   No No   Sig: Apply topically.  Use daily during a flare, apply twice weekly between flares for prevention.   conjugated estrogens (PREMARIN) 0.625 MG/GM vaginal cream   No No   Sig: Place 0.5 g vaginally twice a week   pravastatin (PRAVACHOL) 40 MG tablet   Yes No   Sig: Take 40 mg by mouth daily   traZODone (DESYREL) 50 MG tablet  Self Yes No   Sig: Take 50 mg by mouth At Bedtime.      Facility-Administered Medications: None        Review of Systems    The 10 point Review of Systems is negative other than noted in the HPI or here.     Social History   I have reviewed this patient's social history and updated it with pertinent information if needed.  Social History     Tobacco Use    Smoking status: Never    Smokeless tobacco: Never   Substance Use Topics    Alcohol use: Yes     Comment: OCC    Drug use: No         Family History   ALS  Brother 1       Other Brother 1   Karla Gehrig's Disease   Heart Disease Brother 2   CAD in 50s- sudden cardiac death   Cancer Father   renal cell carcinoma,  esoph ca 88   Heart Disease Father       Heart Disease Mother   CAD in 60s,  of CVA, spine problems   Parkinsonism Sister       Thyroid Disease Sister   graves disease, spine problems too   Cancer-breast No Family History       Cancer-colon No Family History       Cancer-ovarian No Family History       Cancer-prostate No Family History             Allergies   No Known Allergies     Physical Exam   Vital Signs: Temp: 97.2  F (36.2  C) Temp src: Temporal BP: (!) 175/86 Pulse: 73   Resp: 18 SpO2: 94 % O2 Device: None (Room air)    Weight: 0 lbs 0 oz    GEN:   Alert, oriented x 3, appears comfortable, NAD.  NECK:   Supple ,no mass or thyromegaly   HEENT:  Normocephalic/atraumatic, no scleral icterus, no nasal discharge, mouth moist.  CV:   Regular rate and rhythm, no murmur or JVD.  S1 + S2 noted, no S3 or S4.  LUNGS:   Clear to auscultation bilaterally without rales/rhonchi/wheezing/retractions.  Symmetric chest rise on inhalation noted.  ABD:   Active bowel sounds, soft, non-tender/non-distended.  No rebound/guarding/rigidity.  EXT:   No edema.  No cyanosis.  No joint synovitis noted.  SKIN:   Dry to touch, no exanthems noted in the visualized areas.  Neurologic: Grossly intact,non focal.   Neuropsychiatric:  General: normal, calm and normal eye contact  Level of consciousness: alert / normal  Affect: normal  Orientation: oriented to self, place, time and situation     Medical Decision Making       90 MINUTES SPENT BY ME on the date of service doing chart review, history, exam, documentation & further activities per the note.      Data   ------------------------- PAST 24 HR DATA REVIEWED -----------------------------------------------    I have personally reviewed the following data over the past 24 hrs:    8.0  \   14.4   / 254     136 99 13.5 /  144 (H)    3.8 26 0.74 \     INR:  0.93 PTT:  30   D-dimer:  N/A Fibrinogen:  N/A       Imaging results reviewed over the past 24 hrs:   Recent Results (from the past 24 hour(s))   MR Brain w/o & w Contrast    Narrative    EXAM: MR BRAIN W/O and W CONTRAST, MRA NECK (CAROTIDS) W/O and W CONTRAST, MRA BRAIN ([place] W/O CONTRAST  LOCATION: [facility]  DATE: 11/19/2023    INDICATION: right eye vision changes loss, transient  COMPARISON: None.  CONTRAST: 10mL Gadavist  TECHNIQUE:   1) Routine multiplanar multisequence head MRI without and with intravenous contrast.  2) 3D time-of-flight head MRA without intravenous contrast.  3) Neck MRA without and with IV contrast. Stenosis measurements made according to NASCET criteria unless otherwise specified.    FINDINGS:  HEAD MRI:  INTRACRANIAL CONTENTS: No acute or subacute infarct. No mass, acute hemorrhage, or extra-axial fluid collections. Scattered nonspecific T2/FLAIR hyperintensities within the cerebral white matter most consistent with mild chronic microvascular ischemic   change. Moderate generalized cerebral atrophy. No hydrocephalus. Normal position of the cerebellar tonsils. No pathologic contrast enhancement.    SELLA: No abnormality accounting for technique.    OSSEOUS STRUCTURES/SOFT TISSUES: Normal marrow signal. The major intracranial vascular flow voids are maintained. Nonvisualization of normal flow void within distal vertebral arteries and proximal basilar artery.    ORBITS: No abnormality accounting for technique.     SINUSES/MASTOIDS: No paranasal sinus mucosal disease. No middle ear or mastoid effusion.     HEAD MRA:   ANTERIOR CIRCULATION: No stenosis/occlusion, aneurysm, or high flow vascular malformation. Well-developed right posterior communicating artery.    POSTERIOR CIRCULATION: Probable short segment marked narrowing proximal basilar artery with attenuated opacification and decreased lumen of mid to distal basilar artery  and to some degree posterior circulation as well. Dominant right vertebral artery.     NECK MRA:   RIGHT CAROTID: No measurable stenosis or dissection.    LEFT CAROTID: No measurable stenosis or dissection.    VERTEBRAL ARTERIES: No focal stenosis or dissection. Dominant right and smaller left vertebral arteries.    AORTIC ARCH: Classic aortic arch anatomy with no significant stenosis at the origin of the great vessels.      Impression    IMPRESSION:  HEAD MRI:  1.  No acute infarct, mass, mass effect, or hemorrhage.  2.  Mild chronic small vessel ischemia.  3.  Moderate atrophy.    HEAD MRA:  1.  Probable short segment marked narrowing proximal basilar artery with attenuated opacification and decreased lumen of mid to distal basilar artery, distal vertebral arteries, and to some degree posterior circulation as well. CTA neck recommended in   further evaluation.  2.  Intracranial circulation appears otherwise normal.    NECK MRA:  Normal neck MRA.    Results were called to Dr. AGRAWAL at 11/19/2023 5:55 PM CST.    MRA Brain ([place]) wo Contrast    Narrative    EXAM: MR BRAIN W/O and W CONTRAST, MRA NECK (CAROTIDS) W/O and W CONTRAST, MRA BRAIN ([place]) W/O CONTRAST  LOCATION: [facility]  DATE: 11/19/2023    INDICATION: right eye vision changes loss, transient  COMPARISON: None.  CONTRAST: 10mL Gadavist  TECHNIQUE:   1) Routine multiplanar multisequence head MRI without and with intravenous contrast.  2) 3D time-of-flight head MRA without intravenous contrast.  3) Neck MRA without and with IV contrast. Stenosis measurements made according to NASCET criteria unless otherwise specified.    FINDINGS:  HEAD MRI:  INTRACRANIAL CONTENTS: No acute or subacute infarct. No mass, acute hemorrhage, or extra-axial fluid collections. Scattered nonspecific T2/FLAIR hyperintensities within the cerebral white matter most consistent with mild chronic microvascular ischemic   change.  Moderate generalized cerebral atrophy. No hydrocephalus. Normal position of the cerebellar tonsils. No pathologic contrast enhancement.    SELLA: No abnormality accounting for technique.    OSSEOUS STRUCTURES/SOFT TISSUES: Normal marrow signal. The major intracranial vascular flow voids are maintained. Nonvisualization of normal flow void within distal vertebral arteries and proximal basilar artery.    ORBITS: No abnormality accounting for technique.     SINUSES/MASTOIDS: No paranasal sinus mucosal disease. No middle ear or mastoid effusion.     HEAD MRA:   ANTERIOR CIRCULATION: No stenosis/occlusion, aneurysm, or high flow vascular malformation. Well-developed right posterior communicating artery.    POSTERIOR CIRCULATION: Probable short segment marked narrowing proximal basilar artery with attenuated opacification and decreased lumen of mid to distal basilar artery and to some degree posterior circulation as well. Dominant right vertebral artery.     NECK MRA:   RIGHT CAROTID: No measurable stenosis or dissection.    LEFT CAROTID: No measurable stenosis or dissection.    VERTEBRAL ARTERIES: No focal stenosis or dissection. Dominant right and smaller left vertebral arteries.    AORTIC ARCH: Classic aortic arch anatomy with no significant stenosis at the origin of the great vessels.      Impression    IMPRESSION:  HEAD MRI:  1.  No acute infarct, mass, mass effect, or hemorrhage.  2.  Mild chronic small vessel ischemia.  3.  Moderate atrophy.    HEAD MRA:  1.  Probable short segment marked narrowing proximal basilar artery with attenuated opacification and decreased lumen of mid to distal basilar artery, distal vertebral arteries, and to some degree posterior circulation as well. CTA neck recommended in   further evaluation.  2.  Intracranial circulation appears otherwise normal.    NECK MRA:  Normal neck MRA.    Results were called to Dr. AGRAWAL at 11/19/2023 5:55 PM CST.    MRA Neck (Carotids) wo & w Contrast     Narrative    EXAM: MR BRAIN W/O and W CONTRAST, MRA NECK (CAROTIDS) W/O and W CONTRAST, MRA BRAIN ([place]) W/O CONTRAST  LOCATION: [facility]  DATE: 11/19/2023    INDICATION: right eye vision changes loss, transient  COMPARISON: None.  CONTRAST: 10mL Gadavist  TECHNIQUE:   1) Routine multiplanar multisequence head MRI without and with intravenous contrast.  2) 3D time-of-flight head MRA without intravenous contrast.  3) Neck MRA without and with IV contrast. Stenosis measurements made according to NASCET criteria unless otherwise specified.    FINDINGS:  HEAD MRI:  INTRACRANIAL CONTENTS: No acute or subacute infarct. No mass, acute hemorrhage, or extra-axial fluid collections. Scattered nonspecific T2/FLAIR hyperintensities within the cerebral white matter most consistent with mild chronic microvascular ischemic   change. Moderate generalized cerebral atrophy. No hydrocephalus. Normal position of the cerebellar tonsils. No pathologic contrast enhancement.    SELLA: No abnormality accounting for technique.    OSSEOUS STRUCTURES/SOFT TISSUES: Normal marrow signal. The major intracranial vascular flow voids are maintained. Nonvisualization of normal flow void within distal vertebral arteries and proximal basilar artery.    ORBITS: No abnormality accounting for technique.     SINUSES/MASTOIDS: No paranasal sinus mucosal disease. No middle ear or mastoid effusion.     HEAD MRA:   ANTERIOR CIRCULATION: No stenosis/occlusion, aneurysm, or high flow vascular malformation. Well-developed right posterior communicating artery.    POSTERIOR CIRCULATION: Probable short segment marked narrowing proximal basilar artery with attenuated opacification and decreased lumen of mid to distal basilar artery and to some degree posterior circulation as well. Dominant right vertebral artery.     NECK MRA:   RIGHT CAROTID: No measurable stenosis or dissection.    LEFT CAROTID: No measurable stenosis or  dissection.    VERTEBRAL ARTERIES: No focal stenosis or dissection. Dominant right and smaller left vertebral arteries.    AORTIC ARCH: Classic aortic arch anatomy with no significant stenosis at the origin of the great vessels.      Impression    IMPRESSION:  HEAD MRI:  1.  No acute infarct, mass, mass effect, or hemorrhage.  2.  Mild chronic small vessel ischemia.  3.  Moderate atrophy.    HEAD MRA:  1.  Probable short segment marked narrowing proximal basilar artery with attenuated opacification and decreased lumen of mid to distal basilar artery, distal vertebral arteries, and to some degree posterior circulation as well. CTA neck recommended in   further evaluation.  2.  Intracranial circulation appears otherwise normal.    NECK MRA:  Normal neck MRA.    Results were called to Dr. AGRAWAL at 11/19/2023 5:55 PM CST.    CTA Head Neck with Contrast    Narrative    EXAM: CTA HEAD NECK W CONTRAST  LOCATION: [facility]  DATE: 11/19/2023    INDICATION: R eye vision abnormality  COMPARISON: None.  CONTRAST: 67mL Isovue 370  TECHNIQUE: Head and neck CT angiogram with IV contrast. Axial helical CT images of the head and neck vessels obtained during the arterial phase of intravenous contrast administration. Axial 2D reconstructed images and multiplanar 3D MIP reconstructed   images of the head and neck vessels were performed by the technologist. Dose reduction techniques were used. All stenosis measurements made according to NASCET criteria unless otherwise specified.    FINDINGS:   HEAD CTA:  ANTERIOR CIRCULATION: No stenosis/occlusion, aneurysm, or high flow vascular malformation. Well-developed right posterior communicating artery.    POSTERIOR CIRCULATION: Redemonstration of short segment marked narrowing proximal basilar artery with attenuated mid to distal aspect of basilar artery and to some degree posterior circulation as well. Attenuated and narrowed distal aspects of both   vertebral  arteries. Findings could be due to a thromboembolic event. Balanced vertebral arteries supply a normal basilar artery.     DURAL VENOUS SINUSES: Expected enhancement of the major dural venous sinuses.    NECK CTA:  RIGHT CAROTID: No measurable stenosis or dissection.    LEFT CAROTID: No measurable stenosis or dissection.    VERTEBRAL ARTERIES: Attenuated and narrowed distal aspects of both vertebral arteries. Balanced vertebral arteries.    AORTIC ARCH: Classic aortic arch anatomy with no significant stenosis at the origin of the great vessels.    NONVASCULAR STRUCTURES: Unremarkable.      Impression    IMPRESSION:   HEAD CTA:  1.  Redemonstration of short segment marked narrowing proximal basilar artery with attenuated mid to distal aspect of basilar artery and to some degree posterior circulation as well. This could be due to a thromboembolic event.  2.  Attenuated and narrowed distal aspects of both vertebral arteries.  3.  Remainder of intracranial circulation appears normal.    NECK CTA:  1.  No hemodynamically significant narrowing in either ICA.  2.  Attenuated and narrowed distal aspects of both vertebral arteries.    Results were called to Dr. AGRAWAL at 11/19/2023 7:54 PM CST.

## 2023-11-21 ENCOUNTER — TELEPHONE (OUTPATIENT)
Dept: OTHER | Facility: CLINIC | Age: 83
End: 2023-11-21
Payer: COMMERCIAL

## 2023-11-21 NOTE — TELEPHONE ENCOUNTER
"Referral received via [facility] on 11/21/23.    Pt referred to [facility] by Alana Macdonald PA-C  for Hyperlipidemia LDL goal <70     Pt needs to be scheduled for NEW VASCULAR PATIENT consult with vascular medicine.  Will route to scheduling to coordinate an appointment at next available.    Appt note:  Pt referred to [facility] by Alana Macdonald PA-C  for Hyperlipidemia LDL goal <70.  \" intolerant to statins and zetia, consult for PCSK9 inhibitor\"    Agata MEANS, RN    [facility]  Office: [phone]  Fax: [phone]        "

## 2023-11-22 ENCOUNTER — PATIENT OUTREACH (OUTPATIENT)
Dept: CARE COORDINATION | Facility: CLINIC | Age: 83
End: 2023-11-22
Payer: COMMERCIAL

## 2023-11-22 NOTE — TELEPHONE ENCOUNTER
Future Appointments   Date Time Provider Department Center          12/5/2023  1:10 PM Silviano Pinedo MD [facility]

## 2023-11-22 NOTE — PROGRESS NOTES
[facility]    Background: Transitional Care Management program identified per system criteria and reviewed by [facility] team for possible outreach.    Assessment: Upon chart review, [facility] Team member will not proceed with patient outreach related to this episode of Transitional Care Management program due to reason below:    Patient has a follow up appointment with an appropriate provider today for hospital discharge    Plan: Transitional Care Management episode addressed appropriately per reason noted above.      ALESSANDRA Lopez  [facility]    *Connected Care Resource Team does NOT follow patient ongoing. Referrals are identified based on internal discharge reports and the outreach is to ensure patient has an understanding of their discharge instructions.

## 2023-12-04 ENCOUNTER — HOSPITAL ENCOUNTER (OUTPATIENT)
Dept: CARDIOLOGY | Facility: CLINIC | Age: 83
Discharge: HOME | End: 2023-12-04
Attending: PHYSICIAN ASSISTANT | Admitting: PHYSICIAN ASSISTANT
Payer: COMMERCIAL

## 2023-12-04 DIAGNOSIS — G45.9 TIA (TRANSIENT ISCHEMIC ATTACK): ICD-10-CM

## 2023-12-04 PROCEDURE — 93246 EXT ECG>7D<15D RECORDING: CPT

## 2023-12-04 PROCEDURE — 93248 EXT ECG>7D<15D REV&INTERPJ: CPT | Performed by: INTERNAL MEDICINE

## 2023-12-05 ENCOUNTER — OFFICE VISIT (OUTPATIENT)
Dept: OTHER | Facility: CLINIC | Age: 83
End: 2023-12-05
Attending: INTERNAL MEDICINE
Payer: COMMERCIAL

## 2023-12-05 VITALS
HEART RATE: 59 BPM | HEIGHT: 59 IN | BODY MASS INDEX: 26.65 KG/M2 | OXYGEN SATURATION: 98 % | WEIGHT: 132.2 LBS | DIASTOLIC BLOOD PRESSURE: 70 MMHG | SYSTOLIC BLOOD PRESSURE: 120 MMHG

## 2023-12-05 DIAGNOSIS — E78.5 HYPERLIPIDEMIA LDL GOAL <70: ICD-10-CM

## 2023-12-05 DIAGNOSIS — Z78.9 STATIN INTOLERANCE: ICD-10-CM

## 2023-12-05 DIAGNOSIS — H53.123 TRANSIENT VISUAL LOSS OF BOTH EYES: ICD-10-CM

## 2023-12-05 DIAGNOSIS — I10 ESSENTIAL HYPERTENSION: Primary | ICD-10-CM

## 2023-12-05 PROCEDURE — G0463 HOSPITAL OUTPT CLINIC VISIT: HCPCS | Performed by: INTERNAL MEDICINE

## 2023-12-05 PROCEDURE — 99204 OFFICE O/P NEW MOD 45 MIN: CPT | Performed by: INTERNAL MEDICINE

## 2023-12-05 RX ORDER — ROSUVASTATIN CALCIUM 10 MG/1
10 TABLET, COATED ORAL DAILY
Qty: 30 TABLET | Refills: 11 | Status: SHIPPED | OUTPATIENT
Start: 2023-12-05 | End: 2024-03-06

## 2023-12-05 NOTE — PROGRESS NOTES
"Patient is here to discuss consult    BP (!) 182/68 (BP Location: Right arm, Patient Position: Chair, Cuff Size: Adult Regular)   Pulse 59   Ht 4' 11\" (1.499 m)   Wt 132 lb 3.2 oz (60 kg)   SpO2 98%   BMI 26.70 kg/m      Questions patient would like addressed today are: N/A.    Refills are needed: No    Has homecare services and agency name:  [facility]    "

## 2023-12-05 NOTE — PROGRESS NOTES
"INITIAL VASCULAR MEDICAL ASSESSMENT  REFERRAL SOURCE: Alana Macdonald PA-C    REASON FOR CONSULT: for Hyperlipidemia LDL goal <70.   \" intolerant to statins and zetia, consult for PCSK9 inhibitor\"     HPI: Yasmin Larry is a 83 year old female with a h/o htn, HLD, prior TIA times 2, statin intolerance of myalgias to Lipitor and ZOcor. She also is intolerant of Zetia, with sxs of myalgias. The patient presents today to address       Review Of Systems  Skin: negative  Eyes: negative  Ears/Nose/Throat: negative  Respiratory: No shortness of breath, dyspnea on exertion, cough, or hemoptysis  Cardiovascular: negative  Gastrointestinal: negative  Genitourinary: negative  Musculoskeletal: negative  Neurologic: negative  Psychiatric: negative  Hematologic/Lymphatic/Immunologic: negative  Endocrine: negative      PAST MEDICAL HISTORY:                  Past Medical History:   Diagnosis Date    Arthritis     back    Cerebral artery occlusion with cerebral infarction (H)     Hypertension     Hypothyroid     Other chronic pain     left lower leg     PONV (postoperative nausea and vomiting)     Sleep apnea        PAST SURGICAL HISTORY:                  Past Surgical History:   Procedure Laterality Date    CHOLECYSTECTOMY      CHOLECYSTECTOMY      CYSTOSCOPY, SLING TRANSVAGINAL N/A 1/3/2018    Procedure: CYSTOSCOPY, SLING TRANSVAGINAL;;  Surgeon: Robles López MD;  Location: [facility]    DAVINCI HYSTERECTOMY SUPRACERVICAL, SACROCOLPOPEXY, COMBINED N/A 1/3/2018    Procedure: COMBINED DAVINCI XI HYSTERECTOMY SUPRACERVICAL, SACROCOLPOPEXY;;  Surgeon: Robles López MD;  Location: [facility]    DAVINCI RECTOPEXY N/A 1/3/2018    Procedure: DAVINCI XI RECTOPEXY;;  Surgeon: Anusha Becerra MD;  Location: [facility]    DAVINCI SALPINGO-OOPHORECTOMY INCLUDING BILATERAL Bilateral 1/3/2018    Procedure: DAVINCI XI SALPINGO-OOPHORECTOMY INCLUDING BILATERAL;  DAVINCI XI SUPRACERVICAL HYSTERECTOMY, BILATERAL SALPINGO-OOPHORECTOMY, " SACROCOLPOPEXY, TRANSVAGINAL SLING, CYSTOSCOPY (DR. ENGLISH);  DAVINCI XI VENTRAL RECTOPEXY, LAPAROSCOPIC EXTENSIVE LYSIS OF ADHESIONS (DR. NICOLE) ;  Surgeon: Robles López MD;  Location: [facility]    HERNIA REPAIR      ventral hernia    LAPAROSCOPIC LYSIS ADHESIONS N/A 1/3/2018    Procedure: LAPAROSCOPIC LYSIS ADHESIONS;;  Surgeon: Anusha Nicole MD;  Location: [facility]    NEPHRECTOMY         CURRENT MEDICATIONS:                  Current Outpatient Medications   Medication Sig Dispense Refill    acetaminophen (ARTHRITIS PAIN APAP) 650 MG CR tablet Take 1,300 mg by mouth 2 times daily      acetaminophen (ARTHRITIS PAIN APAP) 650 MG CR tablet Take 650 mg by mouth daily as needed for mild pain or fever      amLODIPine (NORVASC) 5 MG tablet Take 5 mg by mouth at bedtime      aspirin (ASA) 325 MG EC tablet Take 1 tablet (325 mg) by mouth daily 90 tablet 0    Calcium Citrate-Vitamin D (CALCIUM CITRATE + D PO) Take 1 tablet by mouth 3 times daily (with meals)      clobetasol (TEMOVATE) 0.05 % external ointment Apply topically.  Use daily during a flare, apply twice weekly between flares for prevention. 45 g 3    clopidogrel (PLAVIX) 75 MG tablet 1 tablet (75 mg) by Oral or NG Tube route daily 90 tablet 0    levothyroxine (SYNTHROID/LEVOTHROID) 100 MCG tablet Take 100 mcg by mouth five times a week      levothyroxine (SYNTHROID/LEVOTHROID) 112 MCG tablet Take 112 mcg by mouth twice a week (Wednesday and Sunday)      losartan (COZAAR) 100 MG tablet Take 100 mg by mouth every morning      Multiple Vitamins-Minerals (MULTIVITAMIN ADULTS PO) Take 1 tablet by mouth daily      omeprazole (PRILOSEC) 20 MG DR capsule Take 20 mg by mouth every morning      propranolol (INDERAL) 20 MG tablet Take 20 mg by mouth 2 times daily      Pyridoxine HCl (VITAMIN B-6 PO) Take 1 tablet by mouth daily      traZODone (DESYREL) 50 MG tablet Take 50-75 mg by mouth nightly as needed for sleep      Cyanocobalamin (VITAMIN B-12 PO) Take 1 tablet  by mouth daily (Patient not taking: Reported on 12/5/2023)         ALLERGIES:                No Known Allergies    SOCIAL HISTORY:                  Social History     Socioeconomic History    Marital status:      Spouse name: Not on file    Number of children: Not on file    Years of education: Not on file    Highest education level: Not on file   Occupational History    Not on file   Tobacco Use    Smoking status: Never    Smokeless tobacco: Never   Substance and Sexual Activity    Alcohol use: Yes     Comment: OCC    Drug use: No    Sexual activity: Yes     Partners: Male   Other Topics Concern    Not on file   Social History Narrative    Not on file     Social Determinants of Health     Financial Resource Strain: Not on file   Food Insecurity: Not on file   Transportation Needs: Not on file   Physical Activity: Not on file   Stress: Not on file   Social Connections: Not on file   Interpersonal Safety: Not on file   Housing Stability: Not on file       FAMILY HISTORY:                 No family history on file.      Physical exam Reveals:    O/P: WNL  HEENT: WNL  NECK: No JVD, thyromegaly, or lymphadenopathy  HEART: RRR, no murmurs, gallops, or rubs  LUNGS: CTA bilaterally without rales, wheezes, or rhonchi  GI: NABS, nondistended, nontender, soft  EXT:without cyanosis, clubbing, or edema  NEURO: nonfocal  : no flank tenderness      Rt DP:  3 plus palpable   Rt PT:   3 plus palpable       Lt DP:  3 plus palpable   Lt PT:   3 plus palpable          A/P:    (I10) Essential hypertension  (primary encounter diagnosis)  Comment: at goal at home  Plan: Comprehensive metabolic panel            (E78.5) Hyperlipidemia LDL goal <70  Comment: not at goal. Intolerant of Lipitor and Zocor with myalgias. Both drugs are metabolized down the same pathway in the liver. Options are to use pravastatin or rosuvastatin to see if she could tolerate as both are metabolized down a different pathway than Lipitor and Zocor, or to  use  a PCSK9 inhibitor. She elects to use the PCSK9 inhibitor only if unable to tolerate Crestor.   Plan: Start rosuvastatin (CRESTOR) 10 MG tablet, Check C-Reactive        Protein, High Sensitivity, Lipoprotein (a),         LipoFit by NMR, T3 Free, T4 free, TSH        In 3/2024. RTC two weeks later. If having myalgias on this , hold it for two weeks then resume to ascertain if myalgias return. Use CoQ10 due to anecdotal reports of myalgia reduction when taken with statins.     (H53.123) Transient visual loss of each eye in separate events in 2013 and just recently  Comment: resolved  Plan: DAP and imaging f/u with neurology    (Z78.9) Statin intolerance  Comment: check the below. Se eabove  Plan: CK total, Aldolase, Erythrocyte sedimentation         rate auto, CRP inflammation                      [facility] Discharge Summary          Yasmin Larry MRN# [id number]   Age: 83 year old YOB: 1940      Date of Admission:                  11/19/2023  Date of Discharge::                 11/20/2023  5:35 PM  Admitting Physician:               Servando Vogt, DO  Discharge Physician:              Kecia Guerrero PA-C  Primary Physician: Nati Cruz     Primary Discharge Diagnoses:   Transient complete vision loss to R eye is concerning for amaurosis fugax (anterior circulation); however, also complained of L eye partial vision loss/blurring as well so potentially posterior circulation TIA      Hospital Course:   For detail history, please refer to H & P from 11/19/2023. In brief, this is a 83 year old female with pertinent past medical history of HLD, HTN, prediabetes, prior TIA with transient left eye vision loss 2013, cervical dystocia, prior CVA, cataracts s/p extraction with IOL each eye, HELENA, hypothyroidism.     She presented to [facility] emergency department 11/19/2023 due to a transient 2-hour episode of complete vision loss to the right eye.  She had associated mild blurring to  the left eye.  No other focal deficits.       TIA Evaluation Summarized     MRI and/or Head CT MRI: Negative for acute pathology, mild chronic SVID, moderate atrophy   Intracranial Vasculature CTA head: Redemonstration of short segment marked narrowing proximal basilar artery with attenuated mid-- distal basilar artery and to some degree posterior circulation as well, possibly due to thromboembolic event  MRA head: Probable short segment marked narrowing proximal basilar artery with attenuated opacification and decreased lumen of noted-distal basilar artery, distal vertebral arteries and to some degree posterior circulation as well   Cervical Vasculature CTA neck: Attenuated and narrowed distal aspects of bilateral vertebral arteries  MRA neck: Normal      Echocardiogram TTE: LV normal, EF 60-65%, no WMA, RV normal, normal bilateral atria size, no color Doppler evidence of atrial shunt, mild trileaflet aortic sclerosis, trace AR, SR   EKG/Telemetry Sinus rhythm, anteroseptal infarct cited on or before 6/23/2014   Other Testing ESR 19  CRP 4.08      LDL 11/19/2023: 131 mg/dL   A1C 11/19/2023: 6.0 %        Patient was seen by Neurology   - started on  mg daily x 90 days then decrease to PTA ASA 81 mg daily indefinitely.    - started on Plavix 75 mg daily x 90 days.    - Recommend PCSK9 inhibitor to help lower cholesterol (referral placed to vascular medicine)  - pt reports she is intolerant to statins  - telemetry, 14-day Zio patch x 2 per patient preference as opposed to CardioNet   - Follow-up with PCP in 1-2 weeks  - Follow-up with vascular medicine for PCSK9 inhibitor to help lower cholesterol  - Follow-up urgently with ophthalmology in 3-4 days to evaluate for intraocular pathology   - repeat CTA head/neck in 6 weeks   - Follow-up with neurology team in 6-8 weeks      Procedures/Imaging:           Results for orders placed or performed during the hospital encounter of 11/19/23   MR Brain w/o & w Contrast      Narrative     EXAM: MR BRAIN W/O and W CONTRAST, MRA NECK (CAROTIDS) W/O and W CONTRAST, MRA BRAIN ([place] [facility] [place]) W/O CONTRAST  LOCATION: [facility]  DATE: 11/19/2023     INDICATION: right eye vision changes loss, transient  COMPARISON: None.  CONTRAST: 10mL Gadavist  TECHNIQUE:   1) Routine multiplanar multisequence head MRI without and with intravenous contrast.  2) 3D time-of-flight head MRA without intravenous contrast.  3) Neck MRA without and with IV contrast. Stenosis measurements made according to NASCET criteria unless otherwise specified.     FINDINGS:  HEAD MRI:  INTRACRANIAL CONTENTS: No acute or subacute infarct. No mass, acute hemorrhage, or extra-axial fluid collections. Scattered nonspecific T2/FLAIR hyperintensities within the cerebral white matter most consistent with mild chronic microvascular ischemic   change. Moderate generalized cerebral atrophy. No hydrocephalus. Normal position of the cerebellar tonsils. No pathologic contrast enhancement.     SELLA: No abnormality accounting for technique.     OSSEOUS STRUCTURES/SOFT TISSUES: Normal marrow signal. The major intracranial vascular flow voids are maintained. Nonvisualization of normal flow void within distal vertebral arteries and proximal basilar artery.     ORBITS: No abnormality accounting for technique.      SINUSES/MASTOIDS: No paranasal sinus mucosal disease. No middle ear or mastoid effusion.      HEAD MRA:   ANTERIOR CIRCULATION: No stenosis/occlusion, aneurysm, or high flow vascular malformation. Well-developed right posterior communicating artery.     POSTERIOR CIRCULATION: Probable short segment marked narrowing proximal basilar artery with attenuated opacification and decreased lumen of mid to distal basilar artery and to some degree posterior circulation as well. Dominant right vertebral artery.      NECK MRA:   RIGHT CAROTID: No measurable stenosis or dissection.     LEFT CAROTID: No measurable  stenosis or dissection.     VERTEBRAL ARTERIES: No focal stenosis or dissection. Dominant right and smaller left vertebral arteries.     AORTIC ARCH: Classic aortic arch anatomy with no significant stenosis at the origin of the great vessels.        Impression     IMPRESSION:  HEAD MRI:  1.  No acute infarct, mass, mass effect, or hemorrhage.  2.  Mild chronic small vessel ischemia.  3.  Moderate atrophy.     HEAD MRA:  1.  Probable short segment marked narrowing proximal basilar artery with attenuated opacification and decreased lumen of mid to distal basilar artery, distal vertebral arteries, and to some degree posterior circulation as well. CTA neck recommended in   further evaluation.  2.  Intracranial circulation appears otherwise normal.     NECK MRA:  Normal neck MRA.     Results were called to Dr. AGRAWAL at 11/19/2023 5:55 PM CST.    MRA Brain ([place] [facility] [place]) wo Contrast     Narrative     EXAM: MR BRAIN W/O and W CONTRAST, MRA NECK (CAROTIDS) W/O and W CONTRAST, MRA BRAIN ([place] [facility] [place]) W/O CONTRAST  LOCATION: [facility]  DATE: 11/19/2023     INDICATION: right eye vision changes loss, transient  COMPARISON: None.  CONTRAST: 10mL Gadavist  TECHNIQUE:   1) Routine multiplanar multisequence head MRI without and with intravenous contrast.  2) 3D time-of-flight head MRA without intravenous contrast.  3) Neck MRA without and with IV contrast. Stenosis measurements made according to NASCET criteria unless otherwise specified.     FINDINGS:  HEAD MRI:  INTRACRANIAL CONTENTS: No acute or subacute infarct. No mass, acute hemorrhage, or extra-axial fluid collections. Scattered nonspecific T2/FLAIR hyperintensities within the cerebral white matter most consistent with mild chronic microvascular ischemic   change. Moderate generalized cerebral atrophy. No hydrocephalus. Normal position of the cerebellar tonsils. No pathologic contrast enhancement.     SELLA: No abnormality accounting for  technique.     OSSEOUS STRUCTURES/SOFT TISSUES: Normal marrow signal. The major intracranial vascular flow voids are maintained. Nonvisualization of normal flow void within distal vertebral arteries and proximal basilar artery.     ORBITS: No abnormality accounting for technique.      SINUSES/MASTOIDS: No paranasal sinus mucosal disease. No middle ear or mastoid effusion.      HEAD MRA:   ANTERIOR CIRCULATION: No stenosis/occlusion, aneurysm, or high flow vascular malformation. Well-developed right posterior communicating artery.     POSTERIOR CIRCULATION: Probable short segment marked narrowing proximal basilar artery with attenuated opacification and decreased lumen of mid to distal basilar artery and to some degree posterior circulation as well. Dominant right vertebral artery.      NECK MRA:   RIGHT CAROTID: No measurable stenosis or dissection.     LEFT CAROTID: No measurable stenosis or dissection.     VERTEBRAL ARTERIES: No focal stenosis or dissection. Dominant right and smaller left vertebral arteries.     AORTIC ARCH: Classic aortic arch anatomy with no significant stenosis at the origin of the great vessels.        Impression     IMPRESSION:  HEAD MRI:  1.  No acute infarct, mass, mass effect, or hemorrhage.  2.  Mild chronic small vessel ischemia.  3.  Moderate atrophy.     HEAD MRA:  1.  Probable short segment marked narrowing proximal basilar artery with attenuated opacification and decreased lumen of mid to distal basilar artery, distal vertebral arteries, and to some degree posterior circulation as well. CTA neck recommended in   further evaluation.  2.  Intracranial circulation appears otherwise normal.     NECK MRA:  Normal neck MRA.     Results were called to Dr. AGRAWAL at 11/19/2023 5:55 PM CST.    MRA Neck (Carotids) wo & w Contrast     Narrative     EXAM: MR BRAIN W/O and W CONTRAST, MRA NECK (CAROTIDS) W/O and W CONTRAST, MRA BRAIN ([place] [facility] [place]) W/O CONTRAST  LOCATION: [facility]  DATE: 11/19/2023     INDICATION: right eye vision changes loss, transient  COMPARISON: None.  CONTRAST: 10mL Gadavist  TECHNIQUE:   1) Routine multiplanar multisequence head MRI without and with intravenous contrast.  2) 3D time-of-flight head MRA without intravenous contrast.  3) Neck MRA without and with IV contrast. Stenosis measurements made according to NASCET criteria unless otherwise specified.     FINDINGS:  HEAD MRI:  INTRACRANIAL CONTENTS: No acute or subacute infarct. No mass, acute hemorrhage, or extra-axial fluid collections. Scattered nonspecific T2/FLAIR hyperintensities within the cerebral white matter most consistent with mild chronic microvascular ischemic   change. Moderate generalized cerebral atrophy. No hydrocephalus. Normal position of the cerebellar tonsils. No pathologic contrast enhancement.     SELLA: No abnormality accounting for technique.     OSSEOUS STRUCTURES/SOFT TISSUES: Normal marrow signal. The major intracranial vascular flow voids are maintained. Nonvisualization of normal flow void within distal vertebral arteries and proximal basilar artery.     ORBITS: No abnormality accounting for technique.      SINUSES/MASTOIDS: No paranasal sinus mucosal disease. No middle ear or mastoid effusion.      HEAD MRA:   ANTERIOR CIRCULATION: No stenosis/occlusion, aneurysm, or high flow vascular malformation. Well-developed right posterior communicating artery.     POSTERIOR CIRCULATION: Probable short segment marked narrowing proximal basilar artery with attenuated opacification and decreased lumen of mid to distal basilar artery and to some degree posterior circulation as well. Dominant right vertebral artery.      NECK MRA:   RIGHT CAROTID: No measurable stenosis or dissection.     LEFT CAROTID: No measurable stenosis or dissection.     VERTEBRAL ARTERIES: No focal stenosis or dissection. Dominant right and smaller left vertebral arteries.     AORTIC ARCH: Classic  aortic arch anatomy with no significant stenosis at the origin of the great vessels.        Impression     IMPRESSION:  HEAD MRI:  1.  No acute infarct, mass, mass effect, or hemorrhage.  2.  Mild chronic small vessel ischemia.  3.  Moderate atrophy.     HEAD MRA:  1.  Probable short segment marked narrowing proximal basilar artery with attenuated opacification and decreased lumen of mid to distal basilar artery, distal vertebral arteries, and to some degree posterior circulation as well. CTA neck recommended in   further evaluation.  2.  Intracranial circulation appears otherwise normal.     NECK MRA:  Normal neck MRA.     Results were called to Dr. AGRAWAL at 11/19/2023 5:55 PM CST.    CTA Head Neck with Contrast     Narrative     EXAM: CTA HEAD NECK W CONTRAST  LOCATION: [facility]  DATE: 11/19/2023     INDICATION: R eye vision abnormality  COMPARISON: None.  CONTRAST: 67mL Isovue 370  TECHNIQUE: Head and neck CT angiogram with IV contrast. Axial helical CT images of the head and neck vessels obtained during the arterial phase of intravenous contrast administration. Axial 2D reconstructed images and multiplanar 3D MIP reconstructed   images of the head and neck vessels were performed by the technologist. Dose reduction techniques were used. All stenosis measurements made according to NASCET criteria unless otherwise specified.     FINDINGS:   HEAD CTA:  ANTERIOR CIRCULATION: No stenosis/occlusion, aneurysm, or high flow vascular malformation. Well-developed right posterior communicating artery.     POSTERIOR CIRCULATION: Redemonstration of short segment marked narrowing proximal basilar artery with attenuated mid to distal aspect of basilar artery and to some degree posterior circulation as well. Attenuated and narrowed distal aspects of both   vertebral arteries. Findings could be due to a thromboembolic event. Balanced vertebral arteries supply a normal basilar artery.      DURAL VENOUS  SINUSES: Expected enhancement of the major dural venous sinuses.     NECK CTA:  RIGHT CAROTID: No measurable stenosis or dissection.     LEFT CAROTID: No measurable stenosis or dissection.     VERTEBRAL ARTERIES: Attenuated and narrowed distal aspects of both vertebral arteries. Balanced vertebral arteries.     AORTIC ARCH: Classic aortic arch anatomy with no significant stenosis at the origin of the great vessels.     NONVASCULAR STRUCTURES: Unremarkable.        Impression     IMPRESSION:   HEAD CTA:  1.  Redemonstration of short segment marked narrowing proximal basilar artery with attenuated mid to distal aspect of basilar artery and to some degree posterior circulation as well. This could be due to a thromboembolic event.  2.  Attenuated and narrowed distal aspects of both vertebral arteries.  3.  Remainder of intracranial circulation appears normal.     NECK CTA:  1.  No hemodynamically significant narrowing in either ICA.  2.  Attenuated and narrowed distal aspects of both vertebral arteries.     Results were called to Dr. AGRAWAL at 2023 7:54 PM CST.      Echocardiogram Complete     Value     LVEF  60-65%     Narrative     [id number]  [id number]  [id number]  [id number]^KUSUM^ROCAEL^ALEX     [facility]  Echocardiography Laboratory  [address] [place] [address]     Name: HEAVEN CHEATHAM  MRN: [id number]  : 1940  Study Date: 2023 08:48 AM  Age: 83 yrs  Gender: Female  Patient Location: [facility]  Reason For Study: TIA  Ordering Physician: ROCAEL NOE  Performed By: [facility]     BSA: 1.5 m2  Height: 59 in  Weight: 129 lb  HR: 65  BP: 166/72 mmHg  ______________________________________________________________________________  Procedure  Complete Portable Echo Adult.  ______________________________________________________________________________  Interpretation Summary     The left ventricle is normal in structure, function and size.  The visual ejection  fraction is 60-65%.  The right ventricle is normal in structure, function and size.  There is trace aortic regurgitation.  ______________________________________________________________________________  Left Ventricle  The left ventricle is normal in structure, function and size. There is normal  left ventricular wall thickness. The visual ejection fraction is 60-65%. No  regional wall motion abnormalities noted.     Right Ventricle  The right ventricle is normal in structure, function and size.     Atria  Normal left atrial size. Right atrial size is normal. There is no color  Doppler evidence of an atrial shunt.     Mitral Valve  The mitral valve is normal in structure and function. There is trace mitral  regurgitation.     Tricuspid Valve  The tricuspid valve is normal in structure and function. There is trace  tricuspid regurgitation. The right ventricular systolic pressure is  approximated at 17.4 mmHg plus the right atrial pressure.     Aortic Valve  There is mild trileaflet aortic sclerosis. There is trace aortic  regurgitation.     Pulmonic Valve  The pulmonic valve is not well seen, but is grossly normal.     Vessels  Normal size aorta. Normal size ascending aorta.     Pericardium  There is no pericardial effusion.     Rhythm  Sinus rhythm was noted.  ______________________________________________________________________________  MMode/2D Measurements & Calculations     IVSd: 1.2 cm  LVIDd: 3.6 cm  LVIDs: 1.9 cm  LVPWd: 0.77 cm  FS: 46.5 %  LV mass(C)d: 106.6 grams  LV mass(C)dI: 69.6 grams/m2  Ao root diam: 2.7 cm  LVOT diam: 1.9 cm  LVOT area: 2.8 cm2  Ao root diam index Ht(cm/m): 1.8  Ao root diam index BSA (cm/m2): 1.7  LA Volume (BP): 38.7 ml  LA Volume Index (BP): 25.3 ml/m2     RV Base: 2.9 cm  RWT: 0.43  TAPSE: 1.7 cm     Doppler Measurements & Calculations  MV E max tamika: 88.7 cm/sec  MV A max tamika: 119.0 cm/sec  MV E/A: 0.75  MV max P.6 mmHg  MV mean P.5 mmHg  MV V2 VTI: 31.2 cm  MVA(VTI):  "1.6 cm2  MV dec time: 0.28 sec  Ao V2 max: 164.0 cm/sec  Ao max P.0 mmHg  Ao V2 mean: 109.0 cm/sec  Ao mean P.0 mmHg  Ao V2 VTI: 31.9 cm  LUIS(I,D): 1.6 cm2  LUIS(V,D): 1.5 cm2  LV V1 max PG: 3.2 mmHg  LV V1 max: 89.5 cm/sec  LV V1 VTI: 17.7 cm  SV(LVOT): 50.1 ml  SI(LVOT): 32.7 ml/m2  PA V2 max: 74.9 cm/sec  PA max P.2 mmHg  PA acc time: 0.08 sec  TR max suraj: 208.5 cm/sec  TR max P.4 mmHg  AV Suraj Ratio (DI): 0.55  LUIS Index (cm2/m2): 1.0  E/E' av.2     Lateral E/e': 18.5  Medial E/e': 19.9  RV S Suraj: 13.9 cm/sec     ______________________________________________________________________________  Report approved by: Yamilet Reis 2023 10:59 AM            Allergies:   No Known Allergies      Subjective:   Patient currently feels asymptomatic.  Feels vision is back to baseline.  Denies any weakness, numbness or tingling.     Physical Exam:   Blood pressure (!) 148/75, pulse 73, temperature 97.3  F (36.3  C), temperature source Oral, resp. rate 18, height 1.499 m (4' 11\"), weight 58.6 kg (129 lb 3.2 oz), SpO2 91%, not currently breastfeeding.  General: Alert, interactive, NAD  HEENT: AT/NC  Resp: clear to auscultation bilaterally, no crackles or wheezes  Cardiac: regular rate and rhythm, no murmur  Abdomen: Soft, nontender, nondistended. +BS.  No rebound or guarding.  Extremities: No LE edema  Skin: Warm and dry, no jaundice or rash  Neuro: Alert & oriented x 3, no focal deficits, moves all extremities equally      Discharge Medicatios:         Discharge Medication List as of 2023  4:54 PM              START taking these medications     Details   clopidogrel (PLAVIX) 75 MG tablet 1 tablet (75 mg) by Oral or NG Tube route daily, Disp-90 tablet, R-0, E-Prescribe                  CONTINUE these medications which have CHANGED     Details   aspirin (ASA) 325 MG EC tablet Take 1 tablet (325 mg) by mouth daily, Disp-90 tablet, R-0, E-Prescribe                  CONTINUE these medications " which have NOT CHANGED     Details   !! acetaminophen (ARTHRITIS PAIN APAP) 650 MG CR tablet Take 1,300 mg by mouth 2 times daily, Historical       !! acetaminophen (ARTHRITIS PAIN APAP) 650 MG CR tablet Take 650 mg by mouth daily as needed for mild pain or fever, Historical       amLODIPine (NORVASC) 5 MG tablet Take 5 mg by mouth at bedtime, Historical       Calcium Citrate-Vitamin D (CALCIUM CITRATE + D PO) Take 1 tablet by mouth 3 times daily (with meals), Historical       Cyanocobalamin (VITAMIN B-12 PO) Take 1 tablet by mouth daily, Historical       !! levothyroxine (SYNTHROID/LEVOTHROID) 100 MCG tablet Take 100 mcg by mouth five times a week, Historical       !! levothyroxine (SYNTHROID/LEVOTHROID) 112 MCG tablet Take 112 mcg by mouth twice a week (Wednesday and Sunday), Historical       losartan (COZAAR) 100 MG tablet Take 100 mg by mouth every morning, Historical       Multiple Vitamins-Minerals (MULTIVITAMIN ADULTS PO) Take 1 tablet by mouth daily, Historical       omeprazole (PRILOSEC) 20 MG DR capsule Take 20 mg by mouth every morning, Historical       propranolol (INDERAL) 20 MG tablet Take 20 mg by mouth 2 times daily, Historical       Pyridoxine HCl (VITAMIN B-6 PO) Take 1 tablet by mouth daily, Historical       traZODone (DESYREL) 50 MG tablet Take 50-75 mg by mouth nightly as needed for sleep, Historical       clobetasol (TEMOVATE) 0.05 % external ointment Apply topically.  Use daily during a flare, apply twice weekly between flares for prevention.Disp-45 g, [id number]        !! - Potential duplicate medications found. Please discuss with provider.             Instructions Given to Patient as Discharge:            Discharge Procedure Orders   CTA Head Neck with Contrast   Standing Status: Future Standing Exp. Date: 11/20/24      Order Specific Question Answer Comments   Clinical Info for the Interpreting Provider 6 week follow-up on basilar stenosis     Priority Routine             Adult Eye   Referral    Standing Status: Future   Referral Priority: Urgent: 3-5 Days Referral Type: Consultation    Requested Specialty: Ophthalmology    Number of Visits Requested: 1            Vascular Medicine Referral    Standing Status: Future   Referral Priority: Priority: 1-2 Weeks Referral Type: Consultation    Requested Specialty: Cardiovascular Disease    Number of Visits Requested: 1           Reason for your hospital stay   Order Comments: You were hospitalized due to concern for a TIA.          Follow-up and recommended labs and tests    Order Comments: - start  mg daily x 90 days then decrease to PTA ASA 81 mg daily indefinitely  - start Plavix 75 mg daily x 90 days  - Recommend PCSK9 inhibitor to help lower cholesterol (referral placed to vascular medicine)  - Zio patch x 2 per patient preference as opposed to CardioNet (ordered)   - follow up with Stroke Class per [facility]  - follow-up with PCP for titration of cholesterol to goal LDL 40-70  - Mediterranean diet can be beneficial for overall decreased cardiovascular risk     -long term outpatient blood pressure goal <130/80 to be achieved slowly over the next several weeks, recommend home monitoring twice daily in AM and PM, keep log and bring to PCP follow-up  - Call our stroke clinic with any questions or concerns at [phone], or send a [employer] medical advice message  - Call 911 with any new stroke symptoms     B - Balance problems  E - Eyes (vision loss)  F - Facial weakness or droop  A - Arm weakness  S - Speech/language  T - Time is brain!     Patient Follow-up    -Follow-up with PCP in 1-2 weeks  -Follow-up with vascular medicine for PCSK9 inhibitor to help lower cholesterol  -Follow-up urgently with ophthalmology in 3-4 days to evaluate for intraocular pathology (ordered)  -repeat CTA head/neck in 6 weeks (ordered)  -Follow-up with neurology team in 6-8 weeks (ordered)          Activity   Order Comments: Your  activity upon discharge: activity as tolerated      Order Specific Question Answer Comments   Is discharge order? Yes              Adult Leadless EKG Monitor 8 to 14 Days   Standing Status: Future Standing Exp. Date: 11/20/24   Order Comments: Elyse 2 of 2      Order Specific Question Answer Comments   Order completed for? Adult Cardiology     Schedule hook-up appt at [facility] [[id number]]     Patient should wear the monitor for 14 days            Diet   Order Comments: Follow this diet upon discharge: Orders Placed This Encounter      Combination Diet Low Saturated Fat Na <2400mg Diet; Thin Liquids (level 0)      Order Specific Question Answer Comments   Is discharge order? Yes              Stroke Hospital Follow Up (for neurologist use only)   Standing Status: Future Standing Exp. Date: 05/20/25   Order Comments: [facility] will call you to coordinate care as prescribed by your provider. If you don t hear from a representative within 2 business days, please call [phone].         Order Specific Question Answer Comments   Schedule Patient With: Any Stroke MARIELA     Specific Diagnosis: TIA     Follow up range in weeks (after discharge) 6-8     Contact: Patient     Scheduling Instructions: [facility] will call you to coordinate care as prescribed by your provider. If you don t hear from a representative within 2 business days, please call [phone].           Pending Tests at Discharge:   Cardiac monitor     Discharge Disposition:      Discharged to home      Kecia Guerrero MS, PA-C  Hospitalist Service  Pager [phone]     >30 minutes was spent in discharge planning, care coordination, physical examination and medication reconciliation on the date of discharge, 11/21/2023         Cosigned by: Servando Vogt DO at 11/22/2023 12:46 AM

## 2023-12-26 ENCOUNTER — TELEPHONE (OUTPATIENT)
Dept: OTHER | Facility: CLINIC | Age: 83
End: 2023-12-26
Payer: COMMERCIAL

## 2023-12-26 NOTE — TELEPHONE ENCOUNTER
[facility] VASCULAR [facility]    Who is the name of the provider?:  Dr Pinedo    What is the location you see this provider at/preferred location?: [place]  Person calling / Facility: Yasmin Arshad  Phone number:  [phone]  Nurse call back needed:  yes     Reason for call:  Patient would like to speak to a nurse or Dr Pinedo to clarify if it is safe for her to take 325mg of ASA states she forgot to mention to Dr Pinedo that she only has 1 kidney and was previously told that taking tylenol is fine but would like to know if 325mg of ASA is to high of a dosage.     Pharmacy location:  n/a  Outside Imaging: n/a   Can we leave a detailed message on this number?  YES

## 2023-12-26 NOTE — TELEPHONE ENCOUNTER
Dr. Pinedo did not prescribe ASA.  Neurology prescribed 325 mg ASA and I called Yasmin and advised she call the neurology clinic at   # [phone]. She verbalized understanding. She is also scheduled for follow up with them on 1/10/23.     Agata MEANS, RN    [facility]  Vascular [facility]  Office: [phone]  Fax: [phone]

## 2024-01-09 ENCOUNTER — TELEPHONE (OUTPATIENT)
Dept: NEUROLOGY | Facility: CLINIC | Age: 84
End: 2024-01-09
Payer: COMMERCIAL

## 2024-01-10 ENCOUNTER — OFFICE VISIT (OUTPATIENT)
Dept: NEUROLOGY | Facility: CLINIC | Age: 84
End: 2024-01-10
Attending: PHYSICIAN ASSISTANT
Payer: COMMERCIAL

## 2024-01-10 VITALS
WEIGHT: 134 LBS | BODY MASS INDEX: 27.06 KG/M2 | DIASTOLIC BLOOD PRESSURE: 81 MMHG | HEART RATE: 56 BPM | SYSTOLIC BLOOD PRESSURE: 181 MMHG | OXYGEN SATURATION: 95 %

## 2024-01-10 DIAGNOSIS — G45.9 TIA (TRANSIENT ISCHEMIC ATTACK): Primary | ICD-10-CM

## 2024-01-10 DIAGNOSIS — E27.8 ADRENAL MASS (H): ICD-10-CM

## 2024-01-10 DIAGNOSIS — C64.1 MALIGNANT NEOPLASM OF RIGHT KIDNEY, EXCEPT RENAL PELVIS (H): ICD-10-CM

## 2024-01-10 DIAGNOSIS — H53.123 TRANSIENT VISUAL LOSS OF BOTH EYES: ICD-10-CM

## 2024-01-10 PROCEDURE — 99417 PROLNG OP E/M EACH 15 MIN: CPT | Performed by: PHYSICIAN ASSISTANT

## 2024-01-10 PROCEDURE — 99215 OFFICE O/P EST HI 40 MIN: CPT | Performed by: PHYSICIAN ASSISTANT

## 2024-01-10 RX ORDER — UBIDECARENONE 50 MG
50 CAPSULE ORAL DAILY
COMMUNITY
Start: 2024-01-10

## 2024-01-10 RX ORDER — ASPIRIN 81 MG/1
81 TABLET ORAL DAILY
Qty: 30 TABLET | Refills: 0 | Status: SHIPPED | OUTPATIENT
Start: 2024-01-10

## 2024-01-10 NOTE — PROGRESS NOTES
__________________________________________________________      [facility]ilou [facility]   [phone]  __________________________________________________________    Chief Complaint: TIA follow-up    History of Present Illness: Yasmin Larry is a 83 year old female with pertinent past medical history of HLD, HTN, prediabetes, prior TIA with transient left eye vision loss 2013, cervical dystocia, prior CVA, cataracts s/p extraction with IOL each eye, HELENA, hypothyroidism.     Was seen at [facility] on 11/19/2023 due to a transient 2-hour episode of complete vision loss of the right eye with mild associated blurring to the left eye.  No other focal deficits.  /101.  For further details of presenting symptoms please refer to consult note from 11/20/2023.  MRI was negative for stroke but CTA/MRA showed short segment marked narrowing to proximal basilar artery, possibly due to thromboembolic event.  Given question of possible bilateral eye symptoms there was concern for a posterior circulation transient ischemic attack (TIA).  Work-up as described below. Started on  mg daily and Plavix 75 mg daily x 90 days followed by monotherapy with ASA 81 mg daily indefinitely.  She reported that she had tried multiple statin medications (per [facility] medical record in care everywhere states she tried atorvastatin, simvastatin, rosuvastatin, and pravastatin) and then started on ezetimibe but stopped taking it 3 days PTA due to severe leg pain following all of these medications.  She was referred to vascular medicine to consider PCSK9 inhibitor.  She was also recommended to follow-up with ophthalmology to evaluate for intraocular pathology.  She was recommended to repeat CTA head/neck in 6 weeks (not yet completed).    She was seen today in clinic for possible TIA follow-up.  She denies any recurrent events.  Her blood pressure in clinic today is 181/81. She reports that she  "usually gets white coat HTN but normally SBP 160s not 180s. She did take her BP med this morning. She had been checking BP regularly prior to the holidays and it was well controlled. Discussed that I will message her PCP about the high reading and requested that patient check BP twice daily and after a few days send the readings to her PCP to consider adjusting antihypertensives. She says she has been very stressed preparing for the holidays but feeling better now. Denies feeling of hopelessness/depression.    Followed up with vascular medicine Dr. Pinedo 12/05/2023.  She reported that she had had an intolerance to Lipitor and Zocor with myalgias in the past but had not tried pravastatin or Crestor. Was open to starting Crestor 10 mg daily and follow-up labs ordered. She says she will be seeing him again in February, may increase dose pending response.    She has some concerns with taking higher dose of  mg daily given single kidney. Since it is has already been 2 months and somewhat unclear if basilar artery the cause of her presenting symptoms given complete vision loss to the R eye, okay to do ASA 81 mg daily in addition to plavix for remainder of the final month. After the 90 day course of plavix then stop and continue ASA 81 mg daily indefinitely. There was note in med list that she was not taking plavix but she does recall taking clopidogrel daily.  She denies any bleeding issues or black/tarry stools.    Ziopatch showed 78 SVT runs up to 15 beats, no symptoms, no Afib. Discussed with vascular neurologist Dr. Feliciano regarding if concern of underlying Afib given tachy arrhythmia high enough to warrant starting anticoagulation or should consider ILR. Recommended to follow-up with cardiology for SVTs who can determine if longer heart monitoring needed. Patient denies any associated episodes of lightheadedness but did feel a \"racing heart\" 1-2 times during monitoring, unclear if during SVT. Does endorse some " "mild shortness of breath with exertion and no chest pain.     She attempted to schedule follow-up with ophthalmology but says she was not able to get in for a visit so saw optometrist instead. She had a stye in her R eye 1 week after event that has since resolved. She feels that her vision is back to her baseline but not good. Vision has changed a lot in the past year, hard to read up close. After cataract surgery one eye was adjusted for near sight and other for far sight. She only uses glasses for distance. She an eye appointment scheduled at the end of this month. She denies any residual \"grayed out\" vision like she presented to the hospital with.    Repeat CTA not yet completed.  Order still pending.  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  She denies any symptoms from starting Crestor.      -Sleep Apnea screen: Uses CPAP nightly, takes off at 2-3 am   -Depression screen: Denies feeling down/depressed/hopeless  -Smoking screen: No history of smoking  -Alcohol screen: Endorses occasional 1 small \"shot-size\" glass of wine at most 1-2 times per week   -Drug screen: Denies nonprescription medication, illicit drug use, herbal supplement not on medication list  -B symptom screen: Denies fevers, night sweats, unintentional weight loss    TIA evaluation Summarized:    Studies reviewed by me:    MRI and/or Head CT MRI: Negative for acute pathology, mild chronic SVID, moderate atrophy   Intracranial Vasculature Repeat CTA head: Pending  CTA head: Redemonstration of short segment marked narrowing proximal basilar artery with attenuated mid-- distal basilar artery and to some degree posterior circulation as well, possibly due to thromboembolic event  MRA head: Probable short segment marked narrowing proximal basilar artery with attenuated opacification and decreased lumen of noted-distal basilar artery, distal vertebral arteries and to some degree posterior circulation as well   Cervical Vasculature " Repeat CTA neck: Pending  CTA neck: Attenuated and narrowed distal aspects of bilateral vertebral arteries  MRA neck: Normal      Echocardiogram TTE: LV normal, EF 60-65%, no WMA, RV normal, normal bilateral atria size, no color Doppler evidence of atrial shunt, mild trileaflet aortic sclerosis, trace AR, SR   EKG/Telemetry Sinus rhythm, anteroseptal infarct cited on or before 6/23/2014    7-day ziopatch #1: no symptoms, no Afib  14-day ziopatch #2: 78 SVT runs up to 15 beats, No symptoms, no Afib   Other Testing ESR 19  CRP 4.08       I personally reviewed the following neuroimaging studies today and the comments above reflect my own personal interpretation of the images: images: CTA head/neck and MRI brain    Labs Lab Results   Component Value Date     (H) 11/19/2023    A1C 6.0 (H) 11/19/2023    INR 0.93 11/19/2023    INR 0.96 05/14/2013               ABCD2 Patients Score   Age ? 60 years 1 point 1   Blood Pressure    SBP ? 140 or DBP ?  90     1 point 1   Clinical Features    - Unilateral weakness    - Speech disturbance w/o weakness    - Other    2 points  1 point     0 points 0   Duration of symptoms    ? 60 minutes    10-59 minutes    < 10 minutes    2 points  1 point  0 points 2   Diabetes  1 point 0   Patient s ABCD2 Score (0-7) = 4           Impression/Plan:     1. 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 concern for intracranial atherosclerotic stenosis (ICAD) versus less likely thromboembolism of undetermined source, 30-day heart monitor negative for Afib but had 78 runs of SVT, ESR/CRP normal, rule out intraocular pathology     Medications:  -okay with ASA 81 mg daily indefinitely given single kidney  -Complete 90-day course of Plavix 75 mg daily and  then stop  -Crestor 10 mg daily  -I will reach out to PCP regarding high blood pressure reading, please check blood pressures at home and message PCP within the next few days with list of home readings     Antiplatelet (Aspirin/plavix) precautions:  -Contact provider immediately with any signs of bleeding or black/tarry stools  -if any future providers request that you hold or stop taking this medication then please reach out to our stroke team to be included in the discussion.    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     Follow-up:  -Ongoing follow-up with PCP for management of vascular risk factors: BP goal <130/80, A1c goal <7%, LDL goal 40-70  (<40 increases risk of intracranial hemorrhage)  -Follow-up again with stroke team in 3 months  -Recommend follow-up with cardiology team for SVT and shortness of breath with exertion, they can consider if longer heart monitor needed as well  -Follow-up with vascular medicine per their recommendations and repeat lipid panel  -Follow-up with ophthalmology to evaluate for intraocular pathology    Daily activities:  -home blood pressure monitoring twice daily AM and PM, keep log and bring to medical visits  -continue nightly use of CPAP  -Mediterranean diet can be beneficial for overall decreased cardiovascular risk, moderate aerobic exercise at least 30 minutes/day x 5 days/week    Precautions:  - Call our stroke clinic with any questions or concerns at [phone], or send a [employer] medical advice message  - Call 911 with any new stroke symptoms    B - Balance problems  E - Eyes (vision loss)  F - Facial weakness or droop  A - Arm weakness  S - Speech/language  T - Time is brain!      *All or a portion of this note was generated using voice recognition software and may contain transcription errors.       Stroke Education provided.  She will call us with any questions.  For any acute neurologic deficits she  "was advised to  go directly to the hospital rather than call the clinic.    Alana Macdonald PA-C  Neurology  01/10/2024    ___________________________________________________________________    Current Medications  Current Outpatient Medications   Medication Sig    amLODIPine (NORVASC) 5 MG tablet Take 5 mg by mouth at bedtime    aspirin 81 MG EC tablet Take 1 tablet (81 mg) by mouth daily    Calcium Citrate-Vitamin D (CALCIUM CITRATE + D PO) Take 1 tablet by mouth 3 times daily (with meals)    coenzyme Q-10 (CO-Q10) 50 MG capsule Take 1 capsule (50 mg) by mouth daily    levothyroxine (SYNTHROID/LEVOTHROID) 100 MCG tablet Take 100 mcg by mouth five times a week    levothyroxine (SYNTHROID/LEVOTHROID) 112 MCG tablet Take 112 mcg by mouth twice a week (Wednesday and Sunday)    losartan (COZAAR) 100 MG tablet Take 100 mg by mouth every morning    Multiple Vitamins-Minerals (MULTIVITAMIN ADULTS PO) Take 1 tablet by mouth daily    omeprazole (PRILOSEC) 20 MG DR capsule Take 20 mg by mouth every morning    propranolol (INDERAL) 20 MG tablet Take 20 mg by mouth 2 times daily    Pyridoxine HCl (VITAMIN B-6 PO) Take 1 tablet by mouth daily    rosuvastatin (CRESTOR) 10 MG tablet Take 1 tablet (10 mg) by mouth daily    traZODone (DESYREL) 50 MG tablet Take 50-75 mg by mouth nightly as needed for sleep    clopidogrel (PLAVIX) 75 MG tablet 1 tablet (75 mg) by Oral or NG Tube route daily (Patient not taking: Reported on 1/10/2024)     No current facility-administered medications for this visit.       Physical Exam    Estimated body mass index is 27.06 kg/m  as calculated from the following:    Height as of 12/5/23: 1.499 m (4' 11\").    Weight as of this encounter: 60.8 kg (134 lb).    BP (!) 181/81   Pulse 56   Wt 60.8 kg (134 lb)   SpO2 95%   BMI 27.06 kg/m         General Exam  General: Sitting up in chair in no acute distress  Pulmonary:  no respiratory distress    Neurologic:  Mental Status:  alert, oriented x 3, follows " commands, speech clear and fluent, naming and repetition normal  Cranial Nerves:  visual fields intact, PERRL, EOMI with normal smooth pursuit, facial sensation intact and symmetric, facial movements symmetric, hearing not formally tested but intact to conversation, no dysarthria, tongue protrusion midline  Motor:  normal muscle tone and bulk, no abnormal movements, able to move all limbs spontaneously, strength 5/5 throughout upper and lower extremities, no pronator drift  Reflexes:  toes down-going  Sensory:  light touch sensation intact and symmetric throughout upper and lower extremities, no extinction on double simultaneous stimulation   Coordination:  normal finger-to-nose and heel-to-shin bilaterally without dysmetria  Station/Gait:  deferred    Modified Catrina Scale  Score: 1-No significant disability despite symptoms; able to carry out all usual duties and activities, some chronic vision issues    Questionnaires:        1/10/2024    12:51 PM   Stroke Questionnaire   Residual effects: Any residual effects from stroke? I have some symptoms, but I'm not sure if they're residual effects of the stroke   Residual effects: Describe vision was not good to begin with, but feels it is not the same as before   Level of independence: Could you live alone? Yes   Quality of Life: Rating (0-100%) 100   Quality of Life: What work now or before stroke Retired   Quality of Life: Current work status Retired   Quality of Life: How do you get around Drive myself   Quality of Life: Living situation before stroke With family or significant other   Quality of Life: Living situation now With family or significant other   Medication: How do you take your meds Myself, from a pillbox that I set up   Medication: Do you ever miss or forget meds Rarely   Risk Factor: Checking blood pressure at home Yes   Risk Factor: Usual blood pressure numbers 130s over 70-80s   Risk Factor: Checking blood sugar at home No   Risk Factor: Second-hand  smoke at home No   Risk Factor: How much caffeine per day coffee - 1 to 2 cups daily   Who completed this questionnaire? The patient independently           Billing:    I spent a total of 60 minutes on the day of the visit.   Time spent by me doing chart review, history and exam, documentation and further activities per the note

## 2024-01-10 NOTE — Clinical Note
Jose D Cruz,  I just wanted to give you a heads up that I saw Yasmin today in neurology clinic and her blood pressure was 181/81. She said it had been very well controlled at home though but stopped checking as frequently after the holidays. She will be checking it twice daily and sending you the readings after a few days to see if any adjustments need to be made to her home medication list. She was also referred to cardiology for SVTs showing up on heart monitor and reports of some shortness of breath with exertion.  Thanks,  Alana

## 2024-01-10 NOTE — NURSING NOTE
Symptoms or concerns today: 1 kidney, advised not take advil, motrim - only tylenol.     Med comments: not taking acetaminophen arthritic, questions about aspirin dosage    Who the patient is here with today:  - Arron Gresham, CMA

## 2024-01-10 NOTE — PATIENT INSTRUCTIONS
Medications:  -okay with ASA 81 mg daily indefinitely given single kidney  -Complete 90-day course of Plavix 75 mg daily and then stop  -Crestor 10 mg daily  -I will reach out to PCP regarding high blood pressure reading, please check at home and message PCP within the next few days with list of home readings    Antiplatelet (Aspirin/plavix) precautions:  -Contact provider immediately with any signs of bleeding or black/tarry stools  -if any future providers request that you hold or stop taking this medication then please reach out to our stroke team to be included in the discussion.    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     Follow-up:  -Ongoing follow-up with PCP for management of vascular risk factors: BP goal <130/80, A1c goal <7%, LDL goal 40-70  (<40 increases risk of intracranial hemorrhage)  -Follow-up again with stroke team in 3 months  -Recommend follow-up with cardiology team for SVT and shortness of breath with exertion, they can consider if longer heart monitor needed as well  -Follow-up with vascular medicine per their recommendations  -Follow-up with ophthalmology to evaluate for intraocular pathology    Daily activities:  -home blood pressure monitoring twice daily AM and PM, keep log and bring to medical visits  -continue nightly use of CPAP  -Mediterranean diet can be beneficial for overall decreased cardiovascular risk, moderate aerobic exercise at least 30 minutes/day x 5 days/week    Precautions:  - Call our stroke clinic with any questions or concerns at [phone], or send a [employer] medical advice message  - Call 911 with any new stroke symptoms    B - Balance problems  E - Eyes (vision loss)  F - Facial weakness or droop  A - Arm weakness  S - Speech/language  T - Time is brain!

## 2024-01-10 NOTE — LETTER
1/10/2024         RE: Yasmin Larry  [address] [facility] [address]        Dear Colleague,    Thank you for referring your patient, Yasmin Larry, to the [facility]. Please see a copy of my visit note below.    __________________________________________________________      [facility] - [place]   [phone]  __________________________________________________________    Chief Complaint: TIA follow-up    History of Present Illness: Yasmin Larry is a 83 year old female with pertinent past medical history of HLD, HTN, prediabetes, prior TIA with transient left eye vision loss 2013, cervical dystocia, prior CVA, cataracts s/p extraction with IOL each eye, HELENA, hypothyroidism.     Was seen at [facility] on 11/19/2023 due to a transient 2-hour episode of complete vision loss of the right eye with mild associated blurring to the left eye.  No other focal deficits.  /101.  For further details of presenting symptoms please refer to consult note from 11/20/2023.  MRI was negative for stroke but CTA/MRA showed short segment marked narrowing to proximal basilar artery, possibly due to thromboembolic event.  Given question of possible bilateral eye symptoms there was concern for a posterior circulation transient ischemic attack (TIA).  Work-up as described below. Started on  mg daily and Plavix 75 mg daily x 90 days followed by monotherapy with ASA 81 mg daily indefinitely.  She reported that she had tried multiple statin medications (per [facility] medical record in care everywhere states she tried atorvastatin, simvastatin, rosuvastatin, and pravastatin) and then started on ezetimibe but stopped taking it 3 days PTA due to severe leg pain following all of these medications.  She was referred to vascular medicine to consider PCSK9 inhibitor.  She was also recommended to follow-up with ophthalmology to evaluate for  intraocular pathology.  She was recommended to repeat CTA head/neck in 6 weeks (not yet completed).    She was seen today in clinic for possible TIA follow-up.  She denies any recurrent events.  Her blood pressure in clinic today is 181/81. She reports that she usually gets white coat HTN but normally SBP 160s not 180s. She did take her BP med this morning. She had been checking BP regularly prior to the holidays and it was well controlled. Discussed that I will message her PCP about the high reading and requested that patient check BP twice daily and after a few days send the readings to her PCP to consider adjusting antihypertensives. She says she has been very stressed preparing for the holidays but feeling better now. Denies feeling of hopelessness/depression.    Followed up with vascular medicine Dr. Pinedo 12/05/2023.  She reported that she had had an intolerance to Lipitor and Zocor with myalgias in the past but had not tried pravastatin or Crestor. Was open to starting Crestor 10 mg daily and follow-up labs ordered. She says she will be seeing him again in February, may increase dose pending response.    She has some concerns with taking higher dose of  mg daily given single kidney. Since it is has already been 2 months and somewhat unclear if basilar artery the cause of her presenting symptoms given complete vision loss to the R eye, okay to do ASA 81 mg daily in addition to plavix for remainder of the final month. After the 90 day course of plavix then stop and continue ASA 81 mg daily indefinitely. There was note in med list that she was not taking plavix but she does recall taking clopidogrel daily.  She denies any bleeding issues or black/tarry stools.    Ziopatch showed 78 SVT runs up to 15 beats, no symptoms, no Afib. Discussed with vascular neurologist Dr. Feliciano regarding if concern of underlying Afib given tachy arrhythmia high enough to warrant starting anticoagulation or should consider ILR.  "Recommended to follow-up with cardiology for SVTs who can determine if longer heart monitoring needed. Patient denies any associated episodes of lightheadedness but did feel a \"racing heart\" 1-2 times during monitoring, unclear if during SVT. Does endorse some mild shortness of breath with exertion and no chest pain.     She attempted to schedule follow-up with ophthalmology but says she was not able to get in for a visit so saw optometrist instead. She had a stye in her R eye 1 week after event that has since resolved. She feels that her vision is back to her baseline but not good. Vision has changed a lot in the past year, hard to read up close. After cataract surgery one eye was adjusted for near sight and other for far sight. She only uses glasses for distance. She an eye appointment scheduled at the end of this month. She denies any residual \"grayed out\" vision like she presented to the hospital with.    Repeat CTA not yet completed.  Order still pending.  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  She denies any symptoms from starting Crestor.      -Sleep Apnea screen: Uses CPAP nightly, takes off at 2-3 am   -Depression screen: Denies feeling down/depressed/hopeless  -Smoking screen: No history of smoking  -Alcohol screen: Endorses occasional 1 small \"shot-size\" glass of wine at most 1-2 times per week   -Drug screen: Denies nonprescription medication, illicit drug use, herbal supplement not on medication list  -B symptom screen: Denies fevers, night sweats, unintentional weight loss    TIA evaluation Summarized:    Studies reviewed by me:    MRI and/or Head CT MRI: Negative for acute pathology, mild chronic SVID, moderate atrophy   Intracranial Vasculature Repeat CTA head: Pending  CTA head: Redemonstration of short segment marked narrowing proximal basilar artery with attenuated mid-- distal basilar artery and to some degree posterior circulation as well, possibly due to thromboembolic " event  MRA head: Probable short segment marked narrowing proximal basilar artery with attenuated opacification and decreased lumen of noted-distal basilar artery, distal vertebral arteries and to some degree posterior circulation as well   Cervical Vasculature Repeat CTA neck: Pending  CTA neck: Attenuated and narrowed distal aspects of bilateral vertebral arteries  MRA neck: Normal      Echocardiogram TTE: LV normal, EF 60-65%, no WMA, RV normal, normal bilateral atria size, no color Doppler evidence of atrial shunt, mild trileaflet aortic sclerosis, trace AR, SR   EKG/Telemetry Sinus rhythm, anteroseptal infarct cited on or before 6/23/2014    7-day ziopatch #1: no symptoms, no Afib  14-day ziopatch #2: 78 SVT runs up to 15 beats, No symptoms, no Afib   Other Testing ESR 19  CRP 4.08       I personally reviewed the following neuroimaging studies today and the comments above reflect my own personal interpretation of the images: images: CTA head/neck and MRI brain    Labs Lab Results   Component Value Date     (H) 11/19/2023    A1C 6.0 (H) 11/19/2023    INR 0.93 11/19/2023    INR 0.96 05/14/2013               ABCD2 Patients Score   Age = 60 years 1 point 1   Blood Pressure    SBP = 140 or DBP =  90     1 point 1   Clinical Features    - Unilateral weakness    - Speech disturbance w/o weakness    - Other    2 points  1 point     0 points 0   Duration of symptoms    = 60 minutes    10-59 minutes    < 10 minutes    2 points  1 point  0 points 2   Diabetes  1 point 0   Patient s ABCD2 Score (0-7) = 4           Impression/Plan:     1. 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 concern for intracranial atherosclerotic stenosis (ICAD) versus less likely thromboembolism of  undetermined source, 30-day heart monitor negative for Afib but had 78 runs of SVT, ESR/CRP normal, rule out intraocular pathology     Medications:  -okay with ASA 81 mg daily indefinitely given single kidney  -Complete 90-day course of Plavix 75 mg daily and then stop  -Crestor 10 mg daily  -I will reach out to PCP regarding high blood pressure reading, please check blood pressures at home and message PCP within the next few days with list of home readings     Antiplatelet (Aspirin/plavix) precautions:  -Contact provider immediately with any signs of bleeding or black/tarry stools  -if any future providers request that you hold or stop taking this medication then please reach out to our stroke team to be included in the discussion.    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     Follow-up:  -Ongoing follow-up with PCP for management of vascular risk factors: BP goal <130/80, A1c goal <7%, LDL goal 40-70  (<40 increases risk of intracranial hemorrhage)  -Follow-up again with stroke team in 3 months  -Recommend follow-up with cardiology team for SVT and shortness of breath with exertion, they can consider if longer heart monitor needed as well  -Follow-up with vascular medicine per their recommendations and repeat lipid panel  -Follow-up with ophthalmology to evaluate for intraocular pathology    Daily activities:  -home blood pressure monitoring twice daily AM and PM, keep log and bring to medical visits  -continue nightly use of CPAP  -Mediterranean diet can be beneficial for overall decreased cardiovascular risk, moderate aerobic exercise at least 30 minutes/day x 5 days/week    Precautions:  - Call our stroke clinic with any questions or concerns at [phone], or send a [employer] medical advice message  - Call 911 with any new stroke symptoms    B - Balance problems  E - Eyes (vision loss)  F - Facial weakness or droop  A - Arm weakness  S -  "Speech/language  T - Time is brain!      *All or a portion of this note was generated using voice recognition software and may contain transcription errors.       Stroke Education provided.  She will call us with any questions.  For any acute neurologic deficits she was advised to  go directly to the hospital rather than call the clinic.    Alana Macdonald PA-C  Neurology  01/10/2024    ___________________________________________________________________    Current Medications  Current Outpatient Medications   Medication Sig     amLODIPine (NORVASC) 5 MG tablet Take 5 mg by mouth at bedtime     aspirin 81 MG EC tablet Take 1 tablet (81 mg) by mouth daily     Calcium Citrate-Vitamin D (CALCIUM CITRATE + D PO) Take 1 tablet by mouth 3 times daily (with meals)     coenzyme Q-10 (CO-Q10) 50 MG capsule Take 1 capsule (50 mg) by mouth daily     levothyroxine (SYNTHROID/LEVOTHROID) 100 MCG tablet Take 100 mcg by mouth five times a week     levothyroxine (SYNTHROID/LEVOTHROID) 112 MCG tablet Take 112 mcg by mouth twice a week (Wednesday and Sunday)     losartan (COZAAR) 100 MG tablet Take 100 mg by mouth every morning     Multiple Vitamins-Minerals (MULTIVITAMIN ADULTS PO) Take 1 tablet by mouth daily     omeprazole (PRILOSEC) 20 MG DR capsule Take 20 mg by mouth every morning     propranolol (INDERAL) 20 MG tablet Take 20 mg by mouth 2 times daily     Pyridoxine HCl (VITAMIN B-6 PO) Take 1 tablet by mouth daily     rosuvastatin (CRESTOR) 10 MG tablet Take 1 tablet (10 mg) by mouth daily     traZODone (DESYREL) 50 MG tablet Take 50-75 mg by mouth nightly as needed for sleep     clopidogrel (PLAVIX) 75 MG tablet 1 tablet (75 mg) by Oral or NG Tube route daily (Patient not taking: Reported on 1/10/2024)     No current facility-administered medications for this visit.       Physical Exam    Estimated body mass index is 27.06 kg/m  as calculated from the following:    Height as of 12/5/23: 1.499 m (4' 11\").    Weight as of this " encounter: 60.8 kg (134 lb).    BP (!) 181/81   Pulse 56   Wt 60.8 kg (134 lb)   SpO2 95%   BMI 27.06 kg/m         General Exam  General: Sitting up in chair in no acute distress  Pulmonary:  no respiratory distress    Neurologic:  Mental Status:  alert, oriented x 3, follows commands, speech clear and fluent, naming and repetition normal  Cranial Nerves:  visual fields intact, PERRL, EOMI with normal smooth pursuit, facial sensation intact and symmetric, facial movements symmetric, hearing not formally tested but intact to conversation, no dysarthria, tongue protrusion midline  Motor:  normal muscle tone and bulk, no abnormal movements, able to move all limbs spontaneously, strength 5/5 throughout upper and lower extremities, no pronator drift  Reflexes:  toes down-going  Sensory:  light touch sensation intact and symmetric throughout upper and lower extremities, no extinction on double simultaneous stimulation   Coordination:  normal finger-to-nose and heel-to-shin bilaterally without dysmetria  Station/Gait:  deferred    Modified Catrina Scale  Score: 1-No significant disability despite symptoms; able to carry out all usual duties and activities, some chronic vision issues    Questionnaires:        1/10/2024    12:51 PM   Stroke Questionnaire   Residual effects: Any residual effects from stroke? I have some symptoms, but I'm not sure if they're residual effects of the stroke   Residual effects: Describe vision was not good to begin with, but feels it is not the same as before   Level of independence: Could you live alone? Yes   Quality of Life: Rating (0-100%) 100   Quality of Life: What work now or before stroke Retired   Quality of Life: Current work status Retired   Quality of Life: How do you get around Drive myself   Quality of Life: Living situation before stroke With family or significant other   Quality of Life: Living situation now With family or significant other   Medication: How do you take your  meds Myself, from a pillbox that I set up   Medication: Do you ever miss or forget meds Rarely   Risk Factor: Checking blood pressure at home Yes   Risk Factor: Usual blood pressure numbers 130s over 70-80s   Risk Factor: Checking blood sugar at home No   Risk Factor: Second-hand smoke at home No   Risk Factor: How much caffeine per day coffee - 1 to 2 cups daily   Who completed this questionnaire? The patient independently           Billing:    I spent a total of 60 minutes on the day of the visit.   Time spent by me doing chart review, history and exam, documentation and further activities per the note            Again, thank you for allowing me to participate in the care of your patient.        Sincerely,        Alana Macdonald PA-C

## 2024-01-10 NOTE — Clinical Note
Hi, Dr. Cruz,   I want to give you heads up regarding Yasmin.  She is seen today in neurology clinic and blood pressure was elevated 181/81.  She reported that her home blood pressure readings have been very well-controlled.  Recommended that she monitor blood pressures twice daily and send the log to you within the next few days to ensure if continue to be elevated we can adjust medications if necessary.  Please reach out if you have any questions.  Have a great day,  Alana Macdonald PA-C  Vascular Neurology

## 2024-01-24 ENCOUNTER — OFFICE VISIT (OUTPATIENT)
Dept: CARDIOLOGY | Facility: CLINIC | Age: 84
End: 2024-01-24
Attending: PHYSICIAN ASSISTANT
Payer: COMMERCIAL

## 2024-01-24 VITALS
HEIGHT: 60 IN | SYSTOLIC BLOOD PRESSURE: 174 MMHG | WEIGHT: 133 LBS | DIASTOLIC BLOOD PRESSURE: 68 MMHG | HEART RATE: 61 BPM | OXYGEN SATURATION: 98 % | BODY MASS INDEX: 26.11 KG/M2

## 2024-01-24 DIAGNOSIS — R53.83 OTHER FATIGUE: ICD-10-CM

## 2024-01-24 DIAGNOSIS — R06.09 DYSPNEA ON EXERTION: Primary | ICD-10-CM

## 2024-01-24 DIAGNOSIS — G45.9 TIA (TRANSIENT ISCHEMIC ATTACK): ICD-10-CM

## 2024-01-24 PROCEDURE — 99204 OFFICE O/P NEW MOD 45 MIN: CPT | Performed by: INTERNAL MEDICINE

## 2024-01-24 NOTE — PROGRESS NOTES
CARDIOLOGY CLINIC CONSULTATION    PRIMARY CARE PHYSICIAN:  Nati Cruz    HISTORY OF PRESENT ILLNESS:  The patient is a very pleasant 83-year-old female with history of hypertension, hyperlipidemia, prior CVA, hypothyroidism, obstructive sleep apnea and recent hospitalization for TIA who is referred for further evaluation.    She had an echocardiogram after recent hospitalization for TIA.  The echo demonstrated preserved LV function and no wall motion abnormality.  She had event monitor for 10 days in November which showed no evidence of arrhythmia.  She had another event monitor for 21 days which demonstrated no evidence of atrial fibrillation but showed several brief runs of SVT.  The patient did not have symptoms with SVTs.    She does not endorse chest pain.  However, she has been noticing exertional shortness of breath with activities such as climbing flights of stairs over the last several months.  The symptoms are new and are out of ordinary for her.  No presyncope or syncope.  She reports occasional palpitations.    PAST MEDICAL HISTORY:  Past Medical History:   Diagnosis Date    Arthritis     back    Cerebral artery occlusion with cerebral infarction (H)     Hypertension     Hypothyroid     Other chronic pain     left lower leg     PONV (postoperative nausea and vomiting)     Sleep apnea        MEDICATIONS:  Current Outpatient Medications   Medication    amLODIPine (NORVASC) 5 MG tablet    aspirin 81 MG EC tablet    Calcium Citrate-Vitamin D (CALCIUM CITRATE + D PO)    CALCIUM-MAGNESIUM-VITAMIN D PO    coenzyme Q-10 (CO-Q10) 50 MG capsule    levothyroxine (SYNTHROID/LEVOTHROID) 100 MCG tablet    levothyroxine (SYNTHROID/LEVOTHROID) 112 MCG tablet    losartan (COZAAR) 100 MG tablet    Multiple Vitamins-Minerals (MULTIVITAMIN ADULTS PO)    omeprazole (PRILOSEC) 20 MG DR capsule    propranolol (INDERAL) 20 MG tablet    Pyridoxine HCl (VITAMIN B-6 PO)    rosuvastatin (CRESTOR) 10 MG tablet    traZODone  (DESYREL) 50 MG tablet     No current facility-administered medications for this visit.       SOCIAL HISTORY:  I have reviewed this patient's social history and updated it with pertinent information if needed. Yasmin Larry  reports that she has never smoked. She has never used smokeless tobacco. She reports that she does not currently use alcohol. She reports that she does not use drugs.    PHYSICAL EXAM:  Pulse:  [61] 61  BP: (174)/(68) 174/68  SpO2:  [98 %] 98 %  133 lbs 0 oz    Constitutional: alert, no distress  Respiratory: Good bilateral air entry  Cardiovascular: Regular rate and rhythm, no murmurs  GI: nondistended  Neuropsychiatric: appropriate affact    ASSESSMENT: Pertinent issues addressed/ reviewed during this cardiology visit  Exertional dyspnea: Etiology is unclear at this point.  She has multiple risk factors for coronary artery disease therefore will need to rule out underlying ischemia as a potential cause.  Will have her undergo nuclear stress test in the upcoming days to weeks.  Recent TIA: No evidence of atrial fibrillation on recent event monitoring  Brief episodes of SVT, asymptomatic  Hypertension: Blood pressure in the office is elevated.  However she reports whitecoat hypertension and tells me her blood pressure at home is well-controlled.  Hyperlipidemia    RECOMMENDATIONS:  Stress test as above to rule out significant underlying ischemia.  Monitor blood pressure at home and report back.  If systolic blood pressures persistently above 140, recommend adjusting antihypertensive regimen.  She has had close to 30 days of event monitoring.  No evidence of atrial fibrillation.  No further monitoring recommended.  She will keep an eye on symptoms of palpitations.    It was a pleasure seeing this patient in clinic today. Please do not hesitate to contact me with any future questions.     Titi Sims MD, [facility]  Cardiology - [facility]  January 24, 2024    Review of the result(s) of each unique  test - Last ECG, BMP, lipid profile and echocardiogram     The level of medical decision making during this visit was of moderate complexity.    This note was completed in part using dictation via the Dragon voice recognition software. Some word and grammatical errors may occur and must be interpreted in the appropriate clinical context.  If there are any questions pertaining to this issue, please contact me for further clarification.

## 2024-01-24 NOTE — LETTER
1/24/2024    Nati Cruz MD  [address] [address]  [facility] [address]    RE: Yasmin Larry       Dear Colleague,     I had the pleasure of seeing Yasmin Larry in the [facility].  CARDIOLOGY CLINIC CONSULTATION    PRIMARY CARE PHYSICIAN:  Nati Cruz    HISTORY OF PRESENT ILLNESS:  The patient is a very pleasant 83-year-old female with history of hypertension, hyperlipidemia, prior CVA, hypothyroidism, obstructive sleep apnea and recent hospitalization for TIA who is referred for further evaluation.    She had an echocardiogram after recent hospitalization for TIA.  The echo demonstrated preserved LV function and no wall motion abnormality.  She had event monitor for 10 days in November which showed no evidence of arrhythmia.  She had another event monitor for 21 days which demonstrated no evidence of atrial fibrillation but showed several brief runs of SVT.  The patient did not have symptoms with SVTs.    She does not endorse chest pain.  However, she has been noticing exertional shortness of breath with activities such as climbing flights of stairs over the last several months.  The symptoms are new and are out of ordinary for her.  No presyncope or syncope.  She reports occasional palpitations.    PAST MEDICAL HISTORY:  Past Medical History:   Diagnosis Date    Arthritis     back    Cerebral artery occlusion with cerebral infarction (H)     Hypertension     Hypothyroid     Other chronic pain     left lower leg     PONV (postoperative nausea and vomiting)     Sleep apnea        MEDICATIONS:  Current Outpatient Medications   Medication    amLODIPine (NORVASC) 5 MG tablet    aspirin 81 MG EC tablet    Calcium Citrate-Vitamin D (CALCIUM CITRATE + D PO)    CALCIUM-MAGNESIUM-VITAMIN D PO    coenzyme Q-10 (CO-Q10) 50 MG capsule    levothyroxine (SYNTHROID/LEVOTHROID) 100 MCG tablet    levothyroxine (SYNTHROID/LEVOTHROID) 112 MCG tablet    losartan (COZAAR) 100 MG tablet    Multiple  Vitamins-Minerals (MULTIVITAMIN ADULTS PO)    omeprazole (PRILOSEC) 20 MG DR capsule    propranolol (INDERAL) 20 MG tablet    Pyridoxine HCl (VITAMIN B-6 PO)    rosuvastatin (CRESTOR) 10 MG tablet    traZODone (DESYREL) 50 MG tablet     No current facility-administered medications for this visit.       SOCIAL HISTORY:  I have reviewed this patient's social history and updated it with pertinent information if needed. Yasmin Larry  reports that she has never smoked. She has never used smokeless tobacco. She reports that she does not currently use alcohol. She reports that she does not use drugs.    PHYSICAL EXAM:  Pulse:  [61] 61  BP: (174)/(68) 174/68  SpO2:  [98 %] 98 %  133 lbs 0 oz    Constitutional: alert, no distress  Respiratory: Good bilateral air entry  Cardiovascular: Regular rate and rhythm, no murmurs  GI: nondistended  Neuropsychiatric: appropriate affact    ASSESSMENT: Pertinent issues addressed/ reviewed during this cardiology visit  Exertional dyspnea: Etiology is unclear at this point.  She has multiple risk factors for coronary artery disease therefore will need to rule out underlying ischemia as a potential cause.  Will have her undergo nuclear stress test in the upcoming days to weeks.  Recent TIA: No evidence of atrial fibrillation on recent event monitoring  Brief episodes of SVT, asymptomatic  Hypertension: Blood pressure in the office is elevated.  However she reports whitecoat hypertension and tells me her blood pressure at home is well-controlled.  Hyperlipidemia    RECOMMENDATIONS:  Stress test as above to rule out significant underlying ischemia.  Monitor blood pressure at home and report back.  If systolic blood pressures persistently above 140, recommend adjusting antihypertensive regimen.  She has had close to 30 days of event monitoring.  No evidence of atrial fibrillation.  No further monitoring recommended.  She will keep an eye on symptoms of palpitations.    It was a pleasure  seeing this patient in clinic today. Please do not hesitate to contact me with any future questions.     Titi Sims MD, [facility]  Cardiology - [facility]  January 24, 2024    Review of the result(s) of each unique test - Last ECG, BMP, lipid profile and echocardiogram     The level of medical decision making during this visit was of moderate complexity.    This note was completed in part using dictation via the Dragon voice recognition software. Some word and grammatical errors may occur and must be interpreted in the appropriate clinical context.  If there are any questions pertaining to this issue, please contact me for further clarification.      Thank you for allowing me to participate in the care of your patient.      Sincerely,     Titi Sims MD, MD     [facility]  cc:   Alana Macdonald PA-C  [address] [place] [address]

## 2024-01-31 ENCOUNTER — HOSPITAL ENCOUNTER (OUTPATIENT)
Dept: NUCLEAR MEDICINE | Facility: CLINIC | Age: 84
Setting detail: NUCLEAR MEDICINE
Discharge: HOME | End: 2024-01-31
Attending: INTERNAL MEDICINE
Payer: COMMERCIAL

## 2024-01-31 ENCOUNTER — HOSPITAL ENCOUNTER (OUTPATIENT)
Dept: CARDIOLOGY | Facility: CLINIC | Age: 84
Discharge: HOME | End: 2024-01-31
Attending: INTERNAL MEDICINE
Payer: COMMERCIAL

## 2024-01-31 DIAGNOSIS — R06.09 DYSPNEA ON EXERTION: ICD-10-CM

## 2024-01-31 LAB
CV STRESS MAX HR HE: 98
NUC STRESS EJECTION FRACTION: 85 %
RATE PRESSURE PRODUCT: NORMAL
STRESS ECHO BASELINE DIASTOLIC HE: 70
STRESS ECHO BASELINE HR: 66 BPM
STRESS ECHO BASELINE SYSTOLIC BP: 162
STRESS ECHO CALCULATED PERCENT HR: 72 %
STRESS ECHO LAST STRESS DIASTOLIC BP: 87
STRESS ECHO LAST STRESS SYSTOLIC BP: 158
STRESS ECHO TARGET HR: 137

## 2024-01-31 PROCEDURE — 93017 CV STRESS TEST TRACING ONLY: CPT

## 2024-01-31 PROCEDURE — 78452 HT MUSCLE IMAGE SPECT MULT: CPT | Mod: 26 | Performed by: INTERNAL MEDICINE

## 2024-01-31 PROCEDURE — 78452 HT MUSCLE IMAGE SPECT MULT: CPT

## 2024-01-31 PROCEDURE — A9500 TC99M SESTAMIBI: HCPCS | Performed by: INTERNAL MEDICINE

## 2024-01-31 PROCEDURE — 343N000001 HC RX 343: Performed by: INTERNAL MEDICINE

## 2024-01-31 PROCEDURE — 93016 CV STRESS TEST SUPVJ ONLY: CPT | Performed by: INTERNAL MEDICINE

## 2024-01-31 PROCEDURE — 250N000011 HC RX IP 250 OP 636: Performed by: INTERNAL MEDICINE

## 2024-01-31 PROCEDURE — 93018 CV STRESS TEST I&R ONLY: CPT | Performed by: INTERNAL MEDICINE

## 2024-01-31 PROCEDURE — 250N000011 HC RX IP 250 OP 636

## 2024-01-31 RX ORDER — AMINOPHYLLINE 25 MG/ML
50-100 INJECTION, SOLUTION INTRAVENOUS
Status: COMPLETED | OUTPATIENT
Start: 2024-01-31 | End: 2024-01-31

## 2024-01-31 RX ORDER — ALBUTEROL SULFATE 90 UG/1
2 AEROSOL, METERED RESPIRATORY (INHALATION) EVERY 5 MIN PRN
Status: DISCONTINUED | OUTPATIENT
Start: 2024-01-31 | End: 2024-02-01 | Stop reason: HOSPADM

## 2024-01-31 RX ORDER — REGADENOSON 0.08 MG/ML
INJECTION, SOLUTION INTRAVENOUS
Status: COMPLETED
Start: 2024-01-31 | End: 2024-01-31

## 2024-01-31 RX ORDER — ACYCLOVIR 200 MG/1
0-1 CAPSULE ORAL
Status: DISCONTINUED | OUTPATIENT
Start: 2024-01-31 | End: 2024-02-01 | Stop reason: HOSPADM

## 2024-01-31 RX ORDER — REGADENOSON 0.08 MG/ML
0.4 INJECTION, SOLUTION INTRAVENOUS ONCE
Status: COMPLETED | OUTPATIENT
Start: 2024-01-31 | End: 2024-01-31

## 2024-01-31 RX ADMIN — REGADENOSON 0.4 MG: 0.08 INJECTION, SOLUTION INTRAVENOUS at 13:08

## 2024-01-31 RX ADMIN — Medication 33 MILLICURIE: at 13:32

## 2024-01-31 RX ADMIN — AMINOPHYLLINE 50 MG: 25 INJECTION, SOLUTION INTRAVENOUS at 13:14

## 2024-01-31 RX ADMIN — Medication 10.6 MILLICURIE: at 11:46

## 2024-02-12 ENCOUNTER — LAB (OUTPATIENT)
Dept: LAB | Facility: CLINIC | Age: 84
End: 2024-02-12
Payer: COMMERCIAL

## 2024-02-12 DIAGNOSIS — Z78.9 STATIN INTOLERANCE: ICD-10-CM

## 2024-02-12 DIAGNOSIS — E78.5 HYPERLIPIDEMIA LDL GOAL <70: ICD-10-CM

## 2024-02-12 DIAGNOSIS — I10 ESSENTIAL HYPERTENSION: ICD-10-CM

## 2024-02-12 LAB
ALBUMIN SERPL BCG-MCNC: 4.4 G/DL (ref 3.5–5.2)
ALP SERPL-CCNC: 97 U/L (ref 40–150)
ALT SERPL W P-5'-P-CCNC: 18 U/L (ref 0–50)
ANION GAP SERPL CALCULATED.3IONS-SCNC: 11 MMOL/L (ref 7–15)
APO A-I SERPL-MCNC: 88 MG/DL
AST SERPL W P-5'-P-CCNC: 29 U/L (ref 0–45)
BILIRUB SERPL-MCNC: 0.3 MG/DL
BUN SERPL-MCNC: 14.7 MG/DL (ref 8–23)
CALCIUM SERPL-MCNC: 9.8 MG/DL (ref 8.8–10.2)
CHLORIDE SERPL-SCNC: 100 MMOL/L (ref 98–107)
CK SERPL-CCNC: 95 U/L (ref 26–192)
CREAT SERPL-MCNC: 0.79 MG/DL (ref 0.51–0.95)
CRP SERPL HS-MCNC: 2.01 MG/L
CRP SERPL-MCNC: <3 MG/L
DEPRECATED HCO3 PLAS-SCNC: 27 MMOL/L (ref 22–29)
EGFRCR SERPLBLD CKD-EPI 2021: 73 ML/MIN/1.73M2
ERYTHROCYTE [SEDIMENTATION RATE] IN BLOOD BY WESTERGREN METHOD: 17 MM/HR (ref 0–30)
GLUCOSE SERPL-MCNC: 100 MG/DL (ref 70–99)
POTASSIUM SERPL-SCNC: 4.2 MMOL/L (ref 3.4–5.3)
PROT SERPL-MCNC: 7.3 G/DL (ref 6.4–8.3)
SODIUM SERPL-SCNC: 138 MMOL/L (ref 135–145)
T3FREE SERPL-MCNC: 2.4 PG/ML (ref 2–4.4)
T4 FREE SERPL-MCNC: 1.93 NG/DL (ref 0.9–1.7)
TSH SERPL DL<=0.005 MIU/L-ACNC: 1.45 UIU/ML (ref 0.3–4.2)

## 2024-02-12 PROCEDURE — 99000 SPECIMEN HANDLING OFFICE-LAB: CPT

## 2024-02-12 PROCEDURE — 83695 ASSAY OF LIPOPROTEIN(A): CPT

## 2024-02-12 PROCEDURE — 82085 ASSAY OF ALDOLASE: CPT | Mod: 90

## 2024-02-12 PROCEDURE — 82550 ASSAY OF CK (CPK): CPT

## 2024-02-12 PROCEDURE — 86141 C-REACTIVE PROTEIN HS: CPT

## 2024-02-12 PROCEDURE — 80053 COMPREHEN METABOLIC PANEL: CPT

## 2024-02-12 PROCEDURE — 83704 LIPOPROTEIN BLD QUAN PART: CPT | Mod: 90

## 2024-02-12 PROCEDURE — 36415 COLL VENOUS BLD VENIPUNCTURE: CPT

## 2024-02-12 PROCEDURE — 84443 ASSAY THYROID STIM HORMONE: CPT

## 2024-02-12 PROCEDURE — 84481 FREE ASSAY (FT-3): CPT

## 2024-02-12 PROCEDURE — 85652 RBC SED RATE AUTOMATED: CPT

## 2024-02-12 PROCEDURE — 84439 ASSAY OF FREE THYROXINE: CPT

## 2024-02-12 PROCEDURE — 80061 LIPID PANEL: CPT | Mod: 90

## 2024-02-14 LAB — ALDOLASE SERPL-CCNC: 3.8 U/L

## 2024-02-16 LAB
CHOLEST SERPL-MCNC: 173 MG/DL
HDL SERPL QN: 9 NM
HDL SERPL-SCNC: 36.5 UMOL/L
HDLC SERPL-MCNC: 55 MG/DL
HLD.LARGE SERPL-SCNC: 6.2 UMOL/L
LDL SERPL QN: 21.1 NM
LDL SERPL-SCNC: 1110 NMOL/L
LDL SMALL SERPL-SCNC: 451 NMOL/L
LDLC SERPL CALC-MCNC: 89 MG/DL
PATHOLOGY STUDY: ABNORMAL
TRIGL SERPL-MCNC: 145 MG/DL
VLDL LARGE SERPL-SCNC: 3.5 NMOL/L
VLDL SERPL QN: 48.8 NM

## 2024-03-06 ENCOUNTER — TELEPHONE (OUTPATIENT)
Dept: OTHER | Facility: CLINIC | Age: 84
End: 2024-03-06

## 2024-03-06 ENCOUNTER — OFFICE VISIT (OUTPATIENT)
Dept: OTHER | Facility: CLINIC | Age: 84
End: 2024-03-06
Attending: INTERNAL MEDICINE
Payer: COMMERCIAL

## 2024-03-06 VITALS
OXYGEN SATURATION: 98 % | SYSTOLIC BLOOD PRESSURE: 154 MMHG | HEART RATE: 61 BPM | BODY MASS INDEX: 27.01 KG/M2 | WEIGHT: 134 LBS | HEIGHT: 59 IN | DIASTOLIC BLOOD PRESSURE: 80 MMHG

## 2024-03-06 DIAGNOSIS — I10 ESSENTIAL HYPERTENSION: ICD-10-CM

## 2024-03-06 DIAGNOSIS — Z78.9 STATIN INTOLERANCE: ICD-10-CM

## 2024-03-06 DIAGNOSIS — E78.5 HYPERLIPIDEMIA LDL GOAL <70: Primary | ICD-10-CM

## 2024-03-06 DIAGNOSIS — H53.123 TRANSIENT VISUAL LOSS OF BOTH EYES: ICD-10-CM

## 2024-03-06 PROCEDURE — G0463 HOSPITAL OUTPT CLINIC VISIT: HCPCS | Performed by: INTERNAL MEDICINE

## 2024-03-06 PROCEDURE — 99215 OFFICE O/P EST HI 40 MIN: CPT | Performed by: INTERNAL MEDICINE

## 2024-03-06 PROCEDURE — G2211 COMPLEX E/M VISIT ADD ON: HCPCS | Performed by: INTERNAL MEDICINE

## 2024-03-06 RX ORDER — ROSUVASTATIN CALCIUM 10 MG/1
10 TABLET, COATED ORAL DAILY
Qty: 90 TABLET | Refills: 3 | Status: SHIPPED | OUTPATIENT
Start: 2024-03-06 | End: 2024-07-22

## 2024-03-06 NOTE — PROGRESS NOTES
"    VASCULAR MEDICINE FOLLOW UP VISIT    REFERRAL SOURCE: Alana Macdonald PA-C    REASON FOR CONSULT: for Hyperlipidemia LDL goal <70.   \" intolerant to statins and zetia, consult for PCSK9 inhibitor\"       A/P:     (I10) Essential hypertension  (primary encounter diagnosis)  Comment: at goal at home  Plan: Comprehensive metabolic panel             (E78.5) Hyperlipidemia LDL goal <70    Comment: Not at goal. Intolerant of Lipitor, pravastatin, and Zocor with myalgias. Both drugs are metabolized down the same pathway in the liver. Options are to use pravastatin or rosuvastatin to see if she could tolerate as both are metabolized down a different pathway than Lipitor and Zocor, or to use  a PCSK9 inhibitor. She elects to use the PCSK9 inhibitor only if unable to tolerate Crestor. We therefore on 12/5/2023 started her on Crestor. She has  been able to tolerate this dose of that med. On this, Advanced lipid testing of 2/12/24 revealed LDL-C of 89, LDL-P of 1110, cardiac CRP of 2.01, and Lp(a) of 88. CK , aldolase, CRP, ESR were normal while on Crestor 10 mg daily. When previously not on Crestor, LDL-C was 131, TG-C were 218, nonHDL was 175      Plan: She would like to add Repatha rather than titrating up on statin given statin intolerance history. , Check C-Reactive        Protein, High Sensitivity, Lipoprotein (a),         LipoFit by NMR, T3 Free, T4 free, TSH        In 6/2024. RTC two weeks later. If having myalgias on this , hold it for two weeks then resume to ascertain if myalgias return. Use CoQ10 due to anecdotal reports of myalgia reduction when taken with statins.        (H53.123) Transient visual loss of each eye in separate events in 2013 and just recently in 12/2023  Comment: resolved  Plan: DAP stopped after initial 90 day course. Subsequent f/u with neurology     (Z78.9) Statin intolerance  Comment:See above  Plan: See above      (I10) Essential hypertension  Comment: BP high here, at home BP  runs " 125-130/70  Plan: No med additions in this regard at present.         The longitudinal care of plan for  Yasmin was addressed during this visit. Due to added complexity of care, we will continue to support Yasmin Larry  and the subsequent management of these conditions and with ongoing continuity of care for these conditions.       47 minutes total medical care on today's date. Multiple questions answered for the patient and her hsusband.     HPI: Yasmin Larry is a 84 year old female with a h/o htn, HLD, prior TIA times 2, statin intolerance of myalgias to Lipitor and Zocor. She also is intolerant of Zetia, with sxs of myalgias. The patient presents today to address         Review Of Systems  Skin: negative  Eyes: negative  Ears/Nose/Throat: negative  Respiratory: No shortness of breath, dyspnea on exertion, cough, or hemoptysis  Cardiovascular: negative  Gastrointestinal: negative  Genitourinary: negative  Musculoskeletal: negative  Neurologic: negative  Psychiatric: negative  Hematologic/Lymphatic/Immunologic: negative  Endocrine: negative        PAST MEDICAL HISTORY:                  Past Medical History        Past Medical History:   Diagnosis Date    Arthritis       back    Cerebral artery occlusion with cerebral infarction (H)      Hypertension      Hypothyroid      Other chronic pain       left lower leg     PONV (postoperative nausea and vomiting)      Sleep apnea              PAST SURGICAL HISTORY:                  Past Surgical History         Past Surgical History:   Procedure Laterality Date    CHOLECYSTECTOMY        CHOLECYSTECTOMY        CYSTOSCOPY, SLING TRANSVAGINAL N/A 1/3/2018     Procedure: CYSTOSCOPY, SLING TRANSVAGINAL;;  Surgeon: Robles López MD;  Location:  OR    DAVINCI HYSTERECTOMY SUPRACERVICAL, SACROCOLPOPEXY, COMBINED N/A 1/3/2018     Procedure: COMBINED DAVINCI XI HYSTERECTOMY SUPRACERVICAL, SACROCOLPOPEXY;;  Surgeon: Robles López MD;  Location:  OR     DAVINCI RECTOPEXY N/A 1/3/2018     Procedure: DAVINCI XI RECTOPEXY;;  Surgeon: Anusha Nicole MD;  Location: [facility] OR    DAVINCI SALPINGO-OOPHORECTOMY INCLUDING BILATERAL Bilateral 1/3/2018     Procedure: DAVINCI XI SALPINGO-OOPHORECTOMY INCLUDING BILATERAL;  DAVINCI XI SUPRACERVICAL HYSTERECTOMY, BILATERAL SALPINGO-OOPHORECTOMY, SACROCOLPOPEXY, TRANSVAGINAL SLING, CYSTOSCOPY (DR. ENGLISH);  DAVINCI XI VENTRAL RECTOPEXY, LAPAROSCOPIC EXTENSIVE LYSIS OF ADHESIONS (DR. NICOLE) ;  Surgeon: Robles López MD;  Location: [facility] OR    HERNIA REPAIR         ventral hernia    LAPAROSCOPIC LYSIS ADHESIONS N/A 1/3/2018     Procedure: LAPAROSCOPIC LYSIS ADHESIONS;;  Surgeon: Anusha Nicole MD;  Location: [facility] OR    NEPHRECTOMY                CURRENT MEDICATIONS:                  Current Outpatient Prescriptions          Current Outpatient Medications   Medication Sig Dispense Refill    acetaminophen (ARTHRITIS PAIN APAP) 650 MG CR tablet Take 1,300 mg by mouth 2 times daily        acetaminophen (ARTHRITIS PAIN APAP) 650 MG CR tablet Take 650 mg by mouth daily as needed for mild pain or fever        amLODIPine (NORVASC) 5 MG tablet Take 5 mg by mouth at bedtime        aspirin (ASA) 325 MG EC tablet Take 1 tablet (325 mg) by mouth daily 90 tablet 0    Calcium Citrate-Vitamin D (CALCIUM CITRATE + D PO) Take 1 tablet by mouth 3 times daily (with meals)        clobetasol (TEMOVATE) 0.05 % external ointment Apply topically.  Use daily during a flare, apply twice weekly between flares for prevention. 45 g 3    clopidogrel (PLAVIX) 75 MG tablet 1 tablet (75 mg) by Oral or NG Tube route daily 90 tablet 0    levothyroxine (SYNTHROID/LEVOTHROID) 100 MCG tablet Take 100 mcg by mouth five times a week        levothyroxine (SYNTHROID/LEVOTHROID) 112 MCG tablet Take 112 mcg by mouth twice a week (Wednesday and Sunday)        losartan (COZAAR) 100 MG tablet Take 100 mg by mouth every morning        Multiple Vitamins-Minerals  (MULTIVITAMIN ADULTS PO) Take 1 tablet by mouth daily        omeprazole (PRILOSEC) 20 MG DR capsule Take 20 mg by mouth every morning        propranolol (INDERAL) 20 MG tablet Take 20 mg by mouth 2 times daily        Pyridoxine HCl (VITAMIN B-6 PO) Take 1 tablet by mouth daily        traZODone (DESYREL) 50 MG tablet Take 50-75 mg by mouth nightly as needed for sleep        Cyanocobalamin (VITAMIN B-12 PO) Take 1 tablet by mouth daily (Patient not taking: Reported on 12/5/2023)                ALLERGIES:                No Known Allergies     SOCIAL HISTORY:                  Social History   Social History            Socioeconomic History    Marital status:        Spouse name: Not on file    Number of children: Not on file    Years of education: Not on file    Highest education level: Not on file   Occupational History    Not on file   Tobacco Use    Smoking status: Never    Smokeless tobacco: Never   Substance and Sexual Activity    Alcohol use: Yes       Comment: OCC    Drug use: No    Sexual activity: Yes       Partners: Male   Other Topics Concern    Not on file   Social History Narrative    Not on file      Social Determinants of Health      Financial Resource Strain: Not on file   Food Insecurity: Not on file   Transportation Needs: Not on file   Physical Activity: Not on file   Stress: Not on file   Social Connections: Not on file   Interpersonal Safety: Not on file   Housing Stability: Not on file            FAMILY HISTORY:                   Family History   No family history on file.           Physical exam Reveals:     O/P: WNL  HEENT: WNL  NECK: No JVD, thyromegaly, or lymphadenopathy  HEART: RRR, no murmurs, gallops, or rubs  LUNGS: CTA bilaterally without rales, wheezes, or rhonchi  GI: NABS, nondistended, nontender, soft  EXT:without cyanosis, clubbing, or edema  NEURO: nonfocal  : no flank tenderness        Rt DP:              3 plus palpable   Rt PT:              3 plus palpable         Lt DP:               3 plus palpable   Lt PT:               3 plus palpable                      Component      Latest Ref Rng 2/12/2024  9:52 AM   Sodium      135 - 145 mmol/L 138    Potassium      3.4 - 5.3 mmol/L 4.2    Carbon Dioxide (CO2)      22 - 29 mmol/L 27    Anion Gap      7 - 15 mmol/L 11    Urea Nitrogen      8.0 - 23.0 mg/dL 14.7    Creatinine      0.51 - 0.95 mg/dL 0.79    GFR Estimate      >60 mL/min/1.73m2 73    Calcium      8.8 - 10.2 mg/dL 9.8    Chloride      98 - 107 mmol/L 100    Glucose      70 - 99 mg/dL 100 (H)    Alkaline Phosphatase      40 - 150 U/L 97    AST      0 - 45 U/L 29    ALT      0 - 50 U/L 18    Protein Total      6.4 - 8.3 g/dL 7.3    Albumin      3.5 - 5.2 g/dL 4.4    Bilirubin Total      <=1.2 mg/dL 0.3    Total Cholesterol      <=199 mg/dL 173    Triglycerides      30 - 149 mg/dL 145    HDL Cholesterol      40 - 59 mg/dL 55    LDL Cholesterol      <=129 mg/dL 89    HDL Size      >=8.9 nm 9.0    VLDL Size      <=46.7 nm 48.8 (H)    LDL Particle Size      >=20.7 nm 21.1    Lge HDL Particle number      >=4.2 umol/L 6.2    HDL Particle Number NMR      >=33.0 umol/L 36.5    Lge VLDL Part number      <=2.7 nmol/L 3.5 (H)    Small LDL Particle number      <=634 nmol/L 451    LDL Particle Number      <=1135 nmol/L 1110    EER LipoFit by NMR See Note    C-Reactive Protein High Sensitivity 2.01    Lipoprotein (a)      <30 mg/dL 88 (H)    Free T3      2.0 - 4.4 pg/mL 2.4    T4 Free      0.90 - 1.70 ng/dL 1.93 (H)    TSH      0.30 - 4.20 uIU/mL 1.45    CK Total      26 - 192 U/L 95    Aldolase      1.2 - 7.6 U/L 3.8    Sed Rate      0 - 30 mm/hr 17    CRP Inflammation      <5.00 mg/L <3.00       Legend:  (H) High    NM Lexiscan stress test (nuc card)  Order: [id number]  Status: Final result       Visible to patient: No (inaccessible in MyChart)       Next appt: None       Dx: Dyspnea on exertion    1 Result Note       1 Follow-up Encounter  Details    Reading Physician Reading Date Result  Priority   CynthiaAlanna paredes Cindy, DO  [phone] 1/31/2024 Routine   Cynthiareggie Alanna Cindy,   [phone] 1/31/2024      Result Text       The nuclear stress test is negative for inducible myocardial ischemia or infarction.    Left ventricular function is hyperdynamic.    The left ventricular ejection fraction at stress is 85%.    Small left ventricular cavity size is noted.    There is no prior study for comparison.

## 2024-03-06 NOTE — PROGRESS NOTES
"Patient is here to discuss follow up    BP (!) 154/80 (BP Location: Right arm, Patient Position: Chair, Cuff Size: Adult Regular)   Pulse 61   Ht 4' 11\" (1.499 m)   Wt 134 lb (60.8 kg)   SpO2 98%   BMI 27.06 kg/m      Questions patient would like addressed today are: N/A.    Refills are needed: No    Has homecare services and agency name:  [facility]    "

## 2024-03-06 NOTE — TELEPHONE ENCOUNTER
Prior Authorization Approval    Authorization Effective Date: 1/1/2024  Authorization Expiration Date: 3/6/2025  Medication: Repatha 140mg/ml  Approved Dose/Quantity:   Reference #:     Insurance Company: [employer] - Phone [phone] Fax [phone]  Expected CoPay:       CoPay Card Available:      Foundation Assistance Needed:    Which Pharmacy is filling the prescription (Not needed for infusion/clinic administered): [facility] [address] [facility] [address] [facility] [address] [facility]  Pharmacy Notified:  yes  Patient Notified:  yes- Pharmacy will contact patient when ready to /ship

## 2024-03-06 NOTE — TELEPHONE ENCOUNTER
Central Prior Authorization Team   Phone: [phone]    PA Initiation    Medication: Repatha 140mg/ml  Insurance Company: [employer] - Phone [phone] Fax [phone]  Pharmacy Filling the Rx: [facility] [address] [place] [address] - [address]  Filling Pharmacy Phone: [phone]  Filling Pharmacy Fax:    Start Date: 3/6/2024      Finished through EPA

## 2024-03-11 ENCOUNTER — TELEPHONE (OUTPATIENT)
Dept: OTHER | Facility: CLINIC | Age: 84
End: 2024-03-11
Payer: COMMERCIAL

## 2024-03-11 NOTE — TELEPHONE ENCOUNTER
[facility]    Who is the name of the provider?:  FRANNY OKEEFE   What is the location you see this provider at/preferred location?: [place]  Person calling / Facility: Yasmin Larry  Phone number:  [phone] (home)  Nurse call back needed:  Yes     Reason for call:  A form was faxed over by Yasmin's insurance company re: repatha - she wants to know if it has been received and signed and faxed back-     Pharmacy location:  [facility] [address] [place] [address]  Outside Imaging: n/a   Can we leave a detailed message on this number?  YES     3/11/2024, 9:40 AM

## 2024-03-11 NOTE — TELEPHONE ENCOUNTER
Prior authorization was approved - 1/1/24 through 3/6/25.    Called patient to let her know.  Left voice message    Cinthya Ayers RN BSN  [facility]  Phone: [phone]  Fax: [phone]

## 2024-04-11 ENCOUNTER — TELEPHONE (OUTPATIENT)
Dept: NEUROLOGY | Facility: CLINIC | Age: 84
End: 2024-04-11

## 2024-04-11 NOTE — TELEPHONE ENCOUNTER
"Called pt to schedule stroke return visit, left message to call back. Patient was seen by the stroke team about 3 months ago, at that time it was recommended patient follow up with the stroke team to check in, review plan and optimize overall stroke risk management and we would like to offer an appointment.     When patient calls back: please schedule Stroke Return with Alana Macdonald PA-C however if no availability with this provider, ok to schedule with a different stroke MARIELA, any visit type as per patient preference, appointment notes \"5 month follow up for stroke\".    "

## 2024-04-18 NOTE — TELEPHONE ENCOUNTER
Spoke to patient, declines scheduling at this time, denies any questions or concerns, continues ASA daily, states she is doing very well. Provided contact info if she would like to schedule.     WILNER CHAUHDRY, [facility]

## 2024-05-31 ENCOUNTER — TELEPHONE (OUTPATIENT)
Dept: OTHER | Facility: CLINIC | Age: 84
End: 2024-05-31
Payer: COMMERCIAL

## 2024-05-31 NOTE — TELEPHONE ENCOUNTER
[facility]    Who is the name of the provider?:  FRANNY OKEEFE   What is the location you see this provider at/preferred location?: [place]  Person calling / Facility: Yasmin Larry  Phone number:  [phone] (home)   Nurse call back needed:  YES     Reason for call:  Patient scheduled for next follow up but has questions about medication dosage and asked for a callback    Pharmacy location:  [facility] [address] [place] [address]  Outside Imaging: n/a   Can we leave a detailed message on this number?  YES     5/31/2024, 10:18 AM

## 2024-05-31 NOTE — TELEPHONE ENCOUNTER
Called patient.  She had been injecting the Repatha in her thigh.  She will consider continuing Repatha and injecting the dose in her abdomen to see if she has similar discomfort.    She will discuss this with Dr. Pinedo at her appointment after her lab draw.  She understands that she will not change her rosuvastatin dose at this time.      Cinthya YOUNGN  [facility]  Phone: [phone]  Fax: [phone]

## 2024-05-31 NOTE — TELEPHONE ENCOUNTER
Returned patient's call.    Patient states she just made her appointments to get her cholesterol rechecked and for follow up.    Patient states she has one Repatha injection left.  She is not taking it because she is experiencing leg swelling and she feels like it is a contributing factor.  She was also having pain in the back of her legs which has resolved since stopping Repatha.    I explained that the leg swelling and pain in the back of her legs is unlikely related to Repatha, but she is adamant that she will be stopping Repatha.    She is still taking rosuvastatin 10 mg.      She is wondering if she should titrate up on her rosuvastatin since she has stopped Repatha.

## 2024-05-31 NOTE — TELEPHONE ENCOUNTER
I agree that the leg swelling and pain in the back of her legs is unlikely related to Repatha (unless that is the site she injects the drug) , but since she is adamant that she will be stopping Repatha, I would not recommend that she instead up titrate on Crestor until after I see her current lab results. .

## 2024-07-01 ENCOUNTER — LAB (OUTPATIENT)
Dept: LAB | Facility: CLINIC | Age: 84
End: 2024-07-01
Payer: COMMERCIAL

## 2024-07-01 DIAGNOSIS — E78.5 HYPERLIPIDEMIA LDL GOAL <70: ICD-10-CM

## 2024-07-01 LAB
ALBUMIN SERPL BCG-MCNC: 4.2 G/DL (ref 3.5–5.2)
ALP SERPL-CCNC: 91 U/L (ref 40–150)
ALT SERPL W P-5'-P-CCNC: 21 U/L (ref 0–50)
ANION GAP SERPL CALCULATED.3IONS-SCNC: 7 MMOL/L (ref 7–15)
APO A-I SERPL-MCNC: 58 MG/DL
AST SERPL W P-5'-P-CCNC: 32 U/L (ref 0–45)
BILIRUB SERPL-MCNC: 0.4 MG/DL
BUN SERPL-MCNC: 21.1 MG/DL (ref 8–23)
CALCIUM SERPL-MCNC: 9.4 MG/DL (ref 8.8–10.2)
CHLORIDE SERPL-SCNC: 102 MMOL/L (ref 98–107)
CREAT SERPL-MCNC: 0.83 MG/DL (ref 0.51–0.95)
CRP SERPL HS-MCNC: 2.54 MG/L
DEPRECATED HCO3 PLAS-SCNC: 28 MMOL/L (ref 22–29)
EGFRCR SERPLBLD CKD-EPI 2021: 69 ML/MIN/1.73M2
GLUCOSE SERPL-MCNC: 100 MG/DL (ref 70–99)
POTASSIUM SERPL-SCNC: 4.6 MMOL/L (ref 3.4–5.3)
PROT SERPL-MCNC: 6.9 G/DL (ref 6.4–8.3)
SODIUM SERPL-SCNC: 137 MMOL/L (ref 135–145)

## 2024-07-01 PROCEDURE — 36415 COLL VENOUS BLD VENIPUNCTURE: CPT | Performed by: PEDIATRICS

## 2024-07-01 PROCEDURE — 80061 LIPID PANEL: CPT | Mod: 90 | Performed by: PEDIATRICS

## 2024-07-01 PROCEDURE — 83704 LIPOPROTEIN BLD QUAN PART: CPT | Mod: 90 | Performed by: PEDIATRICS

## 2024-07-01 PROCEDURE — 99000 SPECIMEN HANDLING OFFICE-LAB: CPT | Performed by: PEDIATRICS

## 2024-07-01 PROCEDURE — 86141 C-REACTIVE PROTEIN HS: CPT | Performed by: PEDIATRICS

## 2024-07-01 PROCEDURE — 80053 COMPREHEN METABOLIC PANEL: CPT | Performed by: PEDIATRICS

## 2024-07-01 PROCEDURE — 83695 ASSAY OF LIPOPROTEIN(A): CPT | Performed by: PEDIATRICS

## 2024-07-06 LAB
CHOLEST SERPL-MCNC: 98 MG/DL
HDL SERPL QN: 9.3 NM
HDL SERPL-SCNC: 30.6 UMOL/L
HDLC SERPL-MCNC: 52 MG/DL
HLD.LARGE SERPL-SCNC: 7 UMOL/L
LDL SERPL QN: 21 NM
LDL SERPL-SCNC: 397 NMOL/L
LDL SMALL SERPL-SCNC: 187 NMOL/L
LDLC SERPL CALC-MCNC: 31 MG/DL
PATHOLOGY STUDY: ABNORMAL
TRIGL SERPL-MCNC: 77 MG/DL
VLDL LARGE SERPL-SCNC: <1.5 NMOL/L
VLDL SERPL QN: 47.4 NM

## 2024-07-22 ENCOUNTER — VIRTUAL VISIT (OUTPATIENT)
Dept: OTHER | Facility: CLINIC | Age: 84
End: 2024-07-22
Attending: INTERNAL MEDICINE
Payer: COMMERCIAL

## 2024-07-22 DIAGNOSIS — I10 ESSENTIAL HYPERTENSION: Primary | ICD-10-CM

## 2024-07-22 DIAGNOSIS — Z78.9 STATIN INTOLERANCE: ICD-10-CM

## 2024-07-22 DIAGNOSIS — H53.123 TRANSIENT VISUAL LOSS OF BOTH EYES: ICD-10-CM

## 2024-07-22 DIAGNOSIS — E78.5 HYPERLIPIDEMIA LDL GOAL <70: ICD-10-CM

## 2024-07-22 PROCEDURE — 99443 PR PHYSICIAN TELEPHONE EVALUATION 21-30 MIN: CPT | Mod: 93 | Performed by: INTERNAL MEDICINE

## 2024-07-22 RX ORDER — LOSARTAN POTASSIUM 100 MG/1
100 TABLET ORAL EVERY MORNING
Qty: 90 TABLET | Refills: 3 | Status: SHIPPED | OUTPATIENT
Start: 2024-07-22

## 2024-07-22 RX ORDER — AMLODIPINE BESYLATE 5 MG/1
5 TABLET ORAL AT BEDTIME
Qty: 90 TABLET | Refills: 3 | Status: SHIPPED | OUTPATIENT
Start: 2024-07-22

## 2024-07-22 RX ORDER — ROSUVASTATIN CALCIUM 10 MG/1
10 TABLET, COATED ORAL DAILY
Qty: 90 TABLET | Refills: 3 | Status: CANCELLED | OUTPATIENT
Start: 2024-07-22

## 2024-07-22 RX ORDER — PROPRANOLOL HYDROCHLORIDE 20 MG/1
20 TABLET ORAL 2 TIMES DAILY
Qty: 180 TABLET | Refills: 3 | Status: SHIPPED | OUTPATIENT
Start: 2024-07-22

## 2024-07-22 RX ORDER — ROSUVASTATIN CALCIUM 20 MG/1
20 TABLET, COATED ORAL DAILY
Qty: 90 TABLET | Refills: 3 | Status: SHIPPED | OUTPATIENT
Start: 2024-07-22

## 2024-07-22 NOTE — PROGRESS NOTES
Yasmin is a 84 year old who is being evaluated via a billable telephone visit.    What phone number would you like to be contacted at? [phone]  How would you like to obtain your AVS? MyChart  Originating Location (pt. Location): Home    Distant Location (provider location):  On-site    Subjective   Yasmin is a 84 year old, presenting for the following health issues:  Telephone ([phone] /Follow-up to 3/6/24 /)      Objective           Vitals:  No vitals were obtained today due to virtual visit.      MAGDALENO MOREIRA

## 2024-07-22 NOTE — PROGRESS NOTES
"VASCULAR MEDICINE FOLLOW UP VISIT     REFERRAL SOURCE: Alana Macdonald PA-C    REASON FOR CONSULT: for Hyperlipidemia LDL goal <70.   \" intolerant to statins and zetia, consult for PCSK9 inhibitor\"         A/P:     (I10) Essential hypertension  (primary encounter diagnosis)  Comment: at goal at home  Plan: Comprehensive metabolic panel             (E78.5) Hyperlipidemia LDL goal <70     Comment: Not at goal. Intolerant of Lipitor, pravastatin, and Zocor with myalgias. Both drugs are metabolized down the same pathway in the liver. Options are to use pravastatin or rosuvastatin to see if she could tolerate as both are metabolized down a different pathway than Lipitor and Zocor, or to use  a PCSK9 inhibitor. She elects to use the PCSK9 inhibitor only if unable to tolerate Crestor. We therefore on 12/5/2023 started her on Crestor. She has  been able to tolerate this dose of that med. On this, Advanced lipid testing of 2/12/24 revealed LDL-C of 89, LDL-P of 1110, cardiac CRP of 2.01, and Lp(a) of 88. CK , aldolase, CRP, ESR were normal while on Crestor 10 mg daily. When previously not on Crestor, LDL-C was 131, TG-C were 218, nonHDL was 175        Plan: At goal but she discontinued Repatha 6/2024 due to pain in her legs. We will titrate up on statin despite statin intolerance history as that is her wish. Check C-Reactive        Protein, High Sensitivity, Lipoprotein (a),         LipoFit by NMR, T3 Free, T4 free, TSH        In 10/2024. RTC two weeks later.         (H53.123) Transient visual loss of each eye in separate events in 2013 and just recently in 12/2023  Comment: resolved  Plan: DAP stopped after initial 90 day course. Subsequent f/u with neurology     (Z78.9) Statin intolerance  Comment:See above  Plan: See above         22 minutes total medical care on today's date.    MD location: office  Pt location: home    Phone call start: 15:10  Phone call end: 15:32       HPI: Yasmin Larry is a 84 year old female " with a h/o htn, HLD, prior TIA times 2, statin intolerance of myalgias to Lipitor and Zocor. She also is intolerant of Zetia, with sxs of myalgias. The patient presents today to address           Review Of Systems  Skin: negative  Eyes: negative  Ears/Nose/Throat: negative  Respiratory: No shortness of breath, dyspnea on exertion, cough, or hemoptysis  Cardiovascular: negative  Gastrointestinal: negative  Genitourinary: negative  Musculoskeletal: negative  Neurologic: negative  Psychiatric: negative  Hematologic/Lymphatic/Immunologic: negative  Endocrine: negative             FAMILY HISTORY:                   Family History   No family history on file.            Physical exam was not undertaken as this was a billable telephone encounter.                        Component      Latest Ref Rng 2/12/2024  9:52 AM   Sodium      135 - 145 mmol/L 138    Potassium      3.4 - 5.3 mmol/L 4.2    Carbon Dioxide (CO2)      22 - 29 mmol/L 27    Anion Gap      7 - 15 mmol/L 11    Urea Nitrogen      8.0 - 23.0 mg/dL 14.7    Creatinine      0.51 - 0.95 mg/dL 0.79    GFR Estimate      >60 mL/min/1.73m2 73    Calcium      8.8 - 10.2 mg/dL 9.8    Chloride      98 - 107 mmol/L 100    Glucose      70 - 99 mg/dL 100 (H)    Alkaline Phosphatase      40 - 150 U/L 97    AST      0 - 45 U/L 29    ALT      0 - 50 U/L 18    Protein Total      6.4 - 8.3 g/dL 7.3    Albumin      3.5 - 5.2 g/dL 4.4    Bilirubin Total      <=1.2 mg/dL 0.3    Total Cholesterol      <=199 mg/dL 173    Triglycerides      30 - 149 mg/dL 145    HDL Cholesterol      40 - 59 mg/dL 55    LDL Cholesterol      <=129 mg/dL 89    HDL Size      >=8.9 nm 9.0    VLDL Size      <=46.7 nm 48.8 (H)    LDL Particle Size      >=20.7 nm 21.1    Lge HDL Particle number      >=4.2 umol/L 6.2    HDL Particle Number NMR      >=33.0 umol/L 36.5    Lge VLDL Part number      <=2.7 nmol/L 3.5 (H)    Small LDL Particle number      <=634 nmol/L 451    LDL Particle Number      <=1135 nmol/L 1110     EER LipoFit by NMR See Note    C-Reactive Protein High Sensitivity 2.01    Lipoprotein (a)      <30 mg/dL 88 (H)    Free T3      2.0 - 4.4 pg/mL 2.4    T4 Free      0.90 - 1.70 ng/dL 1.93 (H)    TSH      0.30 - 4.20 uIU/mL 1.45    CK Total      26 - 192 U/L 95    Aldolase      1.2 - 7.6 U/L 3.8    Sed Rate      0 - 30 mm/hr 17    CRP Inflammation      <5.00 mg/L <3.00          Component      Latest Ref Rng 7/1/2024  9:44 AM   Sodium      135 - 145 mmol/L 137    Potassium      3.4 - 5.3 mmol/L 4.6    Carbon Dioxide (CO2)      22 - 29 mmol/L 28    Anion Gap      7 - 15 mmol/L 7    Urea Nitrogen      8.0 - 23.0 mg/dL 21.1    Creatinine      0.51 - 0.95 mg/dL 0.83    GFR Estimate      >60 mL/min/1.73m2 69    Calcium      8.8 - 10.2 mg/dL 9.4    Chloride      98 - 107 mmol/L 102    Glucose      70 - 99 mg/dL 100 (H)    Alkaline Phosphatase      40 - 150 U/L 91    AST      0 - 45 U/L 32    ALT      0 - 50 U/L 21    Protein Total      6.4 - 8.3 g/dL 6.9    Albumin      3.5 - 5.2 g/dL 4.2    Bilirubin Total      <=1.2 mg/dL 0.4    Total Cholesterol      <=199 mg/dL 98    Triglycerides      30 - 149 mg/dL 77    HDL Cholesterol      40 - 59 mg/dL 52    LDL Cholesterol      <=129 mg/dL 31    HDL Size      >=8.9 nm 9.3    VLDL Size      <=46.7 nm 47.4 (H)    LDL Particle Size      >=20.7 nm 21.0    Lge HDL Particle number      >=4.2 umol/L 7.0    HDL Particle Number NMR      >=33.0 umol/L 30.6 (L)    Lge VLDL Part number      <=2.7 nmol/L <1.5    Small LDL Particle number      <=634 nmol/L 187    LDL Particle Number      <=1135 nmol/L 397    EER LipoFit by NMR See Note    C-Reactive Protein High Sensitivity 2.54    Lipoprotein (a)      <30 mg/dL 58 (H)       Legend:  (H) High  (L) Low

## 2024-09-23 ENCOUNTER — TELEPHONE (OUTPATIENT)
Dept: OTHER | Facility: CLINIC | Age: 84
End: 2024-09-23
Payer: COMMERCIAL

## 2024-09-23 DIAGNOSIS — E78.5 HYPERLIPIDEMIA LDL GOAL <70: Primary | ICD-10-CM

## 2024-09-23 NOTE — TELEPHONE ENCOUNTER
Spoke with patient- she said her primary care doctor has taken over her cholesterol management and she no longer wishes to see Dr. Pinedo

## 2024-09-23 NOTE — TELEPHONE ENCOUNTER
Routing to scheduling to coordinate the following:  Fasting labs in 1 month  Follow up virtually with Dr. Pinedo 2 weeks after labs     Appt note:  follow up to 7/22/24 appt and chris labs    Thank you  Josué Wilburn RN on 9/23/2024 at 10:26 AM

## 2024-09-23 NOTE — TELEPHONE ENCOUNTER
Spoke with patient's spouse- patient unavailable at this time. Left [facility] call back number - spouse stated he would ask patient to call us back when she gets home.

## 2024-09-24 NOTE — TELEPHONE ENCOUNTER
Ok.  All orders from Dr. Pinedo discontinued  Closing encounter    Thank you  Josué Wilburn RN on 9/24/2024 at 8:17 AM

## 2024-12-21 ENCOUNTER — APPOINTMENT (OUTPATIENT)
Dept: CT IMAGING | Facility: CLINIC | Age: 84
End: 2024-12-21
Attending: EMERGENCY MEDICINE
Payer: COMMERCIAL

## 2024-12-21 ENCOUNTER — HOSPITAL ENCOUNTER (EMERGENCY)
Facility: CLINIC | Age: 84
Discharge: HOME | End: 2024-12-21
Attending: EMERGENCY MEDICINE | Admitting: EMERGENCY MEDICINE
Payer: COMMERCIAL

## 2024-12-21 VITALS
SYSTOLIC BLOOD PRESSURE: 162 MMHG | HEIGHT: 59 IN | BODY MASS INDEX: 23.78 KG/M2 | RESPIRATION RATE: 18 BRPM | HEART RATE: 64 BPM | DIASTOLIC BLOOD PRESSURE: 67 MMHG | WEIGHT: 117.95 LBS | OXYGEN SATURATION: 95 % | TEMPERATURE: 97.1 F

## 2024-12-21 DIAGNOSIS — K92.1 HEMATOCHEZIA: ICD-10-CM

## 2024-12-21 DIAGNOSIS — R19.5 DARK STOOLS: ICD-10-CM

## 2024-12-21 DIAGNOSIS — R10.9 ABDOMINAL CRAMPING: ICD-10-CM

## 2024-12-21 LAB
ABO + RH BLD: NORMAL
ANION GAP SERPL CALCULATED.3IONS-SCNC: 13 MMOL/L (ref 7–15)
BASOPHILS # BLD AUTO: 0 10E3/UL (ref 0–0.2)
BASOPHILS NFR BLD AUTO: 0 %
BLD GP AB SCN SERPL QL: NEGATIVE
BUN SERPL-MCNC: 20.5 MG/DL (ref 8–23)
CALCIUM SERPL-MCNC: 9.3 MG/DL (ref 8.8–10.4)
CHLORIDE SERPL-SCNC: 100 MMOL/L (ref 98–107)
CREAT SERPL-MCNC: 0.71 MG/DL (ref 0.51–0.95)
EGFRCR SERPLBLD CKD-EPI 2021: 83 ML/MIN/1.73M2
EOSINOPHIL # BLD AUTO: 0.1 10E3/UL (ref 0–0.7)
EOSINOPHIL NFR BLD AUTO: 1 %
ERYTHROCYTE [DISTWIDTH] IN BLOOD BY AUTOMATED COUNT: 12.8 % (ref 10–15)
GLUCOSE SERPL-MCNC: 106 MG/DL (ref 70–99)
HCO3 SERPL-SCNC: 27 MMOL/L (ref 22–29)
HCT VFR BLD AUTO: 38.5 % (ref 35–47)
HEMOCCULT STL QL: NEGATIVE
HGB BLD-MCNC: 12.8 G/DL (ref 11.7–15.7)
HOLD SPECIMEN: NORMAL
HOLD SPECIMEN: NORMAL
IMM GRANULOCYTES # BLD: 0 10E3/UL
IMM GRANULOCYTES NFR BLD: 0 %
LACTATE SERPL-SCNC: 0.6 MMOL/L (ref 0.7–2)
LYMPHOCYTES # BLD AUTO: 0.9 10E3/UL (ref 0.8–5.3)
LYMPHOCYTES NFR BLD AUTO: 13 %
MCH RBC QN AUTO: 29.8 PG (ref 26.5–33)
MCHC RBC AUTO-ENTMCNC: 33.2 G/DL (ref 31.5–36.5)
MCV RBC AUTO: 90 FL (ref 78–100)
MONOCYTES # BLD AUTO: 1.1 10E3/UL (ref 0–1.3)
MONOCYTES NFR BLD AUTO: 15 %
NEUTROPHILS # BLD AUTO: 4.9 10E3/UL (ref 1.6–8.3)
NEUTROPHILS NFR BLD AUTO: 69 %
NRBC # BLD AUTO: 0 10E3/UL
NRBC BLD AUTO-RTO: 0 /100
PLATELET # BLD AUTO: 173 10E3/UL (ref 150–450)
POTASSIUM SERPL-SCNC: 4.2 MMOL/L (ref 3.4–5.3)
RBC # BLD AUTO: 4.3 10E6/UL (ref 3.8–5.2)
SODIUM SERPL-SCNC: 140 MMOL/L (ref 135–145)
SPECIMEN EXP DATE BLD: NORMAL
WBC # BLD AUTO: 7 10E3/UL (ref 4–11)

## 2024-12-21 PROCEDURE — 83605 ASSAY OF LACTIC ACID: CPT | Performed by: EMERGENCY MEDICINE

## 2024-12-21 PROCEDURE — 86900 BLOOD TYPING SEROLOGIC ABO: CPT | Performed by: EMERGENCY MEDICINE

## 2024-12-21 PROCEDURE — 80048 BASIC METABOLIC PNL TOTAL CA: CPT | Performed by: EMERGENCY MEDICINE

## 2024-12-21 PROCEDURE — 36415 COLL VENOUS BLD VENIPUNCTURE: CPT | Performed by: EMERGENCY MEDICINE

## 2024-12-21 PROCEDURE — 82272 OCCULT BLD FECES 1-3 TESTS: CPT | Performed by: EMERGENCY MEDICINE

## 2024-12-21 PROCEDURE — 74177 CT ABD & PELVIS W/CONTRAST: CPT

## 2024-12-21 PROCEDURE — 250N000011 HC RX IP 250 OP 636: Performed by: EMERGENCY MEDICINE

## 2024-12-21 PROCEDURE — 99285 EMERGENCY DEPT VISIT HI MDM: CPT | Mod: 25

## 2024-12-21 PROCEDURE — 85004 AUTOMATED DIFF WBC COUNT: CPT | Performed by: EMERGENCY MEDICINE

## 2024-12-21 PROCEDURE — 82565 ASSAY OF CREATININE: CPT | Performed by: EMERGENCY MEDICINE

## 2024-12-21 PROCEDURE — 250N000009 HC RX 250: Performed by: EMERGENCY MEDICINE

## 2024-12-21 RX ORDER — IOPAMIDOL 755 MG/ML
59 INJECTION, SOLUTION INTRAVASCULAR ONCE
Status: COMPLETED | OUTPATIENT
Start: 2024-12-21 | End: 2024-12-21

## 2024-12-21 RX ADMIN — IOPAMIDOL 59 ML: 755 INJECTION, SOLUTION INTRAVENOUS at 12:43

## 2024-12-21 RX ADMIN — SODIUM CHLORIDE 55 ML: 9 INJECTION, SOLUTION INTRAVENOUS at 12:44

## 2024-12-21 ASSESSMENT — COLUMBIA-SUICIDE SEVERITY RATING SCALE - C-SSRS
1. IN THE PAST MONTH, HAVE YOU WISHED YOU WERE DEAD OR WISHED YOU COULD GO TO SLEEP AND NOT WAKE UP?: NO
6. HAVE YOU EVER DONE ANYTHING, STARTED TO DO ANYTHING, OR PREPARED TO DO ANYTHING TO END YOUR LIFE?: NO
2. HAVE YOU ACTUALLY HAD ANY THOUGHTS OF KILLING YOURSELF IN THE PAST MONTH?: NO

## 2024-12-21 ASSESSMENT — ACTIVITIES OF DAILY LIVING (ADL)
ADLS_ACUITY_SCORE: 43

## 2024-12-21 NOTE — ED PROVIDER NOTES
"  Emergency Department Note      History of Present Illness     Chief Complaint   Rectal Bleeding      HPI   Yasmin Larry is a 84 year old female on baby aspirin with history notable for hypertension and CVA who presents to the emergency department with concerns for bloody stool.  She notes starting 3 days ago she noticed bright red blood with a small amount of formed stool in it.  She notes she had 2 days of that and then today noticed that it was thicker and ivett in color.  She has associated diffuse abdominal cramping.  She denies any other abnormal bleeding or bruising.  She denies dizziness but \"always feels weak\".  She denies urine change, nausea or vomiting.  She denies abnormal food exposure or travels at the country.  She denies fever or chills.  She notes she has never had this happen before.  She notes on past colonoscopies she has a history of diverticulosis.    Independent Historian   None    Review of External Notes   Colonoscopy 2012 reviewed.  This was colorectal cancer screening and finding was diverticulosis with no other findings.    Past Medical History     Medical History and Problem List   Past Medical History:   Diagnosis Date    Arthritis     Cerebral artery occlusion with cerebral infarction (H)     Hypertension     Hypothyroid     Other chronic pain     PONV (postoperative nausea and vomiting)     Sleep apnea        Medications   amLODIPine (NORVASC) 5 MG tablet  aspirin 81 MG EC tablet  Calcium Citrate-Vitamin D (CALCIUM CITRATE + D PO)  CALCIUM-MAGNESIUM-VITAMIN D PO  coenzyme Q-10 (CO-Q10) 50 MG capsule  levothyroxine (SYNTHROID/LEVOTHROID) 100 MCG tablet  levothyroxine (SYNTHROID/LEVOTHROID) 112 MCG tablet  losartan (COZAAR) 100 MG tablet  Multiple Vitamins-Minerals (MULTIVITAMIN ADULTS PO)  omeprazole (PRILOSEC) 20 MG DR capsule  propranolol (INDERAL) 20 MG tablet  Pyridoxine HCl (VITAMIN B-6 PO)  rosuvastatin (CRESTOR) 20 MG tablet  traZODone (DESYREL) 50 MG " "tablet        Surgical History   Past Surgical History:   Procedure Laterality Date    CHOLECYSTECTOMY      CHOLECYSTECTOMY      CYSTOSCOPY, SLING TRANSVAGINAL N/A 1/3/2018    Procedure: CYSTOSCOPY, SLING TRANSVAGINAL;;  Surgeon: Robles López MD;  Location: [facility] OR    DAVINCI HYSTERECTOMY SUPRACERVICAL, SACROCOLPOPEXY, COMBINED N/A 1/3/2018    Procedure: COMBINED DAVINCI XI HYSTERECTOMY SUPRACERVICAL, SACROCOLPOPEXY;;  Surgeon: Robles López MD;  Location: [facility] OR    DAVINCI RECTOPEXY N/A 1/3/2018    Procedure: DAVINCI XI RECTOPEXY;;  Surgeon: Anusha Nicole MD;  Location: [facility] OR    DAVINCI SALPINGO-OOPHORECTOMY INCLUDING BILATERAL Bilateral 1/3/2018    Procedure: DAVINCI XI SALPINGO-OOPHORECTOMY INCLUDING BILATERAL;  DAVINCI XI SUPRACERVICAL HYSTERECTOMY, BILATERAL SALPINGO-OOPHORECTOMY, SACROCOLPOPEXY, TRANSVAGINAL SLING, CYSTOSCOPY (DR. ENGLISH);  DAVINCI XI VENTRAL RECTOPEXY, LAPAROSCOPIC EXTENSIVE LYSIS OF ADHESIONS (DR. NICOLE) ;  Surgeon: Robles López MD;  Location: [facility] OR    HERNIA REPAIR      ventral hernia    LAPAROSCOPIC LYSIS ADHESIONS N/A 1/3/2018    Procedure: LAPAROSCOPIC LYSIS ADHESIONS;;  Surgeon: Anusha Nicole MD;  Location:  OR    NEPHRECTOMY         Physical Exam     Patient Vitals for the past 24 hrs:   BP Temp Temp src Pulse Resp SpO2 Height Weight   12/21/24 1148 (!) 157/74 -- -- 63 -- 100 % -- --   12/21/24 1133 (!) 172/85 -- -- 65 -- -- -- --   12/21/24 1033 (!) 179/75 -- -- -- -- -- -- --   12/21/24 1031 -- 97.1  F (36.2  C) Temporal 67 18 100 % 1.499 m (4' 11\") 53.5 kg (117 lb 15.1 oz)     Physical Exam  General: Elderly adult, laying on the stretcher  Eyes: PERRL, Conjunctive within normal limits.  No scleral icterus.  ENT: Moist mucous membranes, oropharynx clear.   CV: Normal S1S2, no murmur, rub or gallop. Regular rate and rhythm  Resp: Clear to auscultation bilaterally, no wheezes, rales or rhonchi. Normal respiratory effort.  GI: Abdomen is soft " nondistended.  Left-sided tenderness to palpation.  No palpable masses. No rebound or guarding.  Rectal: No visible external hemorrhoid.  Maroon colored dark stool in the rectal vault.  No bright red blood.  Nontender.  No palpable mass.  MSK:  Normal active range of motion.  Skin: Warm and dry. No rashes or lesions or ecchymoses on visible skin.  Neuro: Alert and oriented. Responds appropriately to all questions and commands. No focal findings appreciated. Normal muscle tone.  Psych: Normal mood and affect. Pleasant.     Diagnostics     Lab Results   Labs Ordered and Resulted from Time of ED Arrival to Time of ED Departure   BASIC METABOLIC PANEL - Abnormal       Result Value    Sodium 140      Potassium 4.2      Chloride 100      Carbon Dioxide (CO2) 27      Anion Gap 13      Urea Nitrogen 20.5      Creatinine 0.71      GFR Estimate 83      Calcium 9.3      Glucose 106 (*)    CBC WITH PLATELETS AND DIFFERENTIAL    WBC Count 7.0      RBC Count 4.30      Hemoglobin 12.8      Hematocrit 38.5      MCV 90      MCH 29.8      MCHC 33.2      RDW 12.8      Platelet Count 173      % Neutrophils 69      % Lymphocytes 13      % Monocytes 15      % Eosinophils 1      % Basophils 0      % Immature Granulocytes 0      NRBCs per 100 WBC 0      Absolute Neutrophils 4.9      Absolute Lymphocytes 0.9      Absolute Monocytes 1.1      Absolute Eosinophils 0.1      Absolute Basophils 0.0      Absolute Immature Granulocytes 0.0      Absolute NRBCs 0.0     LACTIC ACID WHOLE BLOOD WITH 1X REPEAT IN 2 HR WHEN >2   OCCULT BLOOD STOOL   TYPE AND SCREEN, ADULT    ABO/RH(D) O POS      SPECIMEN EXPIRATION DATE [id number]     ABO/RH TYPE AND SCREEN       Imaging   CT Abdomen Pelvis w Contrast   Final Result   IMPRESSION:       1.  No acute findings in the abdomen and pelvis.      2.  Colonic diverticulosis without acute diverticulitis.      3.  Stable 2.1 cm heterogeneous enhancing right adrenal nodule, compatible with a benign lesion,  indeterminate but potentially a pheochromocytoma based on enhancement pattern.      4.   Significantly decreased size of hiatal hernia, now small, possibly rolling-type. Moderate wall thickening of the visualized distal esophagus persists. Recommend clinical correlation for chronic reflux esophagitis.      5.  Colonic diverticulosis without acute diverticulitis.      6.  Increased degenerative anterolisthesis of L4 and L5, now grade 2.          Independent Interpretation   None    ED Course      Medications Administered   Medications   iopamidol (ISOVUE-370) solution 59 mL (59 mLs Intravenous $Given 12/21/24 1243)   sodium chloride 0.9 % bag 500mL for CT scan flush use (55 mLs Intravenous $Given 12/21/24 1244)       Discussion of Management   None    ED Course   Past medical records, nursing notes, and vitals reviewed.  I performed an exam of the patient and obtained history, as documented above.   I reassessed the patient.  She went to the bathroom.  No bright red blood.    RN contacted lab to ensure that the Hemoccult stool was in fact negative, verified.  I reassessed the patient and discussed findings of today's evaluation.  She denies any new or concerning symptoms.  All questions were answered.  She feels comfortable to plan for discharge.    Additional Documentation  None    Medical Decision Making / Diagnosis       MDM   Yasmin Larry is a 84 year old female who presents emergency department with concerns for 2 days of blood in her stool followed by dark stool today.  On examination she does have stool in the rectal vault that is dark maroon in color.  She has some minimal discomfort on palpation of the abdomen on the left.  Multiple etiologies were considered for her symptoms including diverticular bleed, AVM, colon cancer, internal hemorrhoid, less likely fissure or external hemorrhoid.  Given concerns for melena, Hemoccult stool was sent but was normal.  She is not anemic.  She has no signs of bright  red blood here per rectum.  CT abdomen demonstrated some chronic abnormalities but nothing acute.  No evidence of diverticulitis.  I discussed the esophageal thickening with the patient, she notes she is on daily omeprazole.  I do not suspect that bleeding is upper GI in source and it is not clear whether there was bleeding at all.  It is unclear of the cause of the visible blood the patient yesterday, none visualized here, and with Hemoccult negative.  If in fact there were bleeding over the past 2 days it was not enough to cause any hemodynamic stability or anemia and has likely resolved.  The patient remained stable and well appearance.  After lengthy discussion with her and her daughter, they feel comfortable plan for discharge home with return precautions.  Daughter is able to check in with her and the patient does live with her .  Recommend follow-up in 2 to 3 days with the primary care provider.      Disposition   The patient was discharged.     Diagnosis     ICD-10-CM    1. Hematochezia  K92.1       2. Dark stools  R19.5       3. Abdominal cramping  R10.9            MD Lui Thomas Tracy Dianne, MD  12/22/24 1055

## 2024-12-21 NOTE — ED TRIAGE NOTES
"Pt reports \"rust\" like color in her stool today. Yesterday she noticed BRB in toilet water. C/o abd cramping and gas. Not on blood thinners. No n/v or fevers. ABC intact. A&Ox4.      Triage Assessment (Adult)       Row Name 12/21/24 1032          Triage Assessment    Airway WDL WDL        Respiratory WDL    Respiratory WDL WDL        Cardiac WDL    Cardiac WDL WDL                     "

## 2024-12-21 NOTE — DISCHARGE INSTRUCTIONS
Of note, the stool sample today did not show evidence of any red blood cells in it.  This suggest that if you were bleeding yesterday and the day before, that it has since resolved.  Return to the emergency department with recurrence or worsening pain, fever or any other concerning symptom to you.  Consider outpatient colonoscopy or endoscopy if thought clinically indicated.    Discharge Instructions  Gastrointestinal (GI) Bleeding    You have been seen today because of gastrointestinal (GI) bleeding, bleeding somewhere along your digestive tract.  Most common symptoms are blood in the stool or when you have a bowel movement.  The most common causes of minor GI bleeding are ulcers and hemorrhoids.  Other conditions that cause bleeding include abnormal blood vessels in your GI tract, diverticulosis, inflammatory bowel disease, and cancer.  Fortunately, many cases of GI bleeding are not immediately life-threatening and it does not appear that your bleeding is serious enough to require admission to the hospital.    Generally, every Emergency Department visit should have a follow-up clinic visit with either a primary or a specialty clinic/provider. Please follow-up as instructed by your emergency provider today.    Return to the Emergency Department right away if you:  Develop fever with a temperature above 100.4 F.  Vomit (throw up) blood or something that looks like coffee grounds.  Have a bowel movement that looks like tar or has a large amount of blood in it.  Feel weak, light-headed, or faint.  Have a racing heartbeat.  Have abdominal (belly) pain that is new or increasing.  Have new symptoms that worry you.    At your follow up, your regular provider or GI specialist may order further testing such as:  Blood and stool tests.  Endoscopy and/or colonoscopy, where a tube with a camera is used to look at your digestive tract.  Other very specialized tests depending on your symptoms.    What can I do to help  myself?  Take any acid reducing medication prescribed by your provider.  Avoid over the counter medications such as aspirin and Advil , Motrin  (ibuprofen) that can thin your blood or irritate your stomach, making ulcers worse.  If you were given a prescription for medicine here today, be sure to read all of the information (including the package insert) that comes with your prescription.  This will include important information about the medicine, its side effects, and any warnings that you need to know about.  The pharmacist who fills the prescription can provide more information and answer questions you may have about the medicine.  If you have questions or concerns that the pharmacist cannot address, please call or return to the Emergency Department.   Remember that you can always come back to the Emergency Department if you are not able to see your regular provider in the amount of time listed above, if you get any new symptoms, or if there is anything that worries you.

## (undated) DEVICE — PACK DAVINCI GYN SMA15GDFS1

## (undated) DEVICE — GLOVE PROTEXIS BLUE W/NEU-THERA 6.5  2D73EB65

## (undated) DEVICE — SUCTION CANISTER MEDIVAC LINER 3000ML W/LID 65651-530

## (undated) DEVICE — DAVINCI HOT SHEARS TIP COVER  400180

## (undated) DEVICE — SU DERMABOND ADVANCED .7ML DNX12

## (undated) DEVICE — SU VICRYL 0 UR-6 27" J603H

## (undated) DEVICE — SOL WATER IRRIG 3000ML BAG 2B7117

## (undated) DEVICE — GOWN IMPERVIOUS SPECIALTY XLG/XLONG 32474

## (undated) DEVICE — SUCTION IRR TRUMPET VALVE LAP ASU1201

## (undated) DEVICE — DAVINCI XI RETR ENDOWRIST SMALL GRAPTOR 470318

## (undated) DEVICE — TUBING IRRIG CYSTO/BLADDER SET 81" LF 2C4040

## (undated) DEVICE — SU VICRYL 3-0 RB-1 27" J305H

## (undated) DEVICE — CATH TRAY FOLEY SURESTEP 16FR WDRAIN BAG STLK LATEX A300316A

## (undated) DEVICE — DAVINCI XI DRAPE COLUMN 470341

## (undated) DEVICE — GLOVE PROTEXIS W/NEU-THERA 6.0  2D73TE60

## (undated) DEVICE — SU ETHIBOND 2-0 SHDA 30" X563H

## (undated) DEVICE — SU ETHIBOND 0 CT-2 30" X412H

## (undated) DEVICE — DRAPE IOBAN INCISE 23X17" 6650EZ

## (undated) DEVICE — DAVINCI XI DRAPE ARM 470015

## (undated) DEVICE — SYR 10ML FINGER CONTROL W/O NDL 309695

## (undated) DEVICE — DAVINCI XI NDL DRIVER LARGE 470006

## (undated) DEVICE — GLOVE PROTEXIS BLUE W/NEU-THERA 7.0  2D73EB70

## (undated) DEVICE — ENDO ACCESS PLATFORM GELPOINT SGL INCISION CNGL2

## (undated) DEVICE — ESU PENCIL W/HOLSTER E2350H

## (undated) DEVICE — DAVINCI TROCAR 8MM BLADELESS 470357

## (undated) DEVICE — DAVINCI XI MONOPOLAR SCISSORS HOT SHEARS 8MM 470179

## (undated) DEVICE — TUBING SUCTION 12"X1/4" N612

## (undated) DEVICE — GLOVE PROTEXIS W/NEU-THERA 7.0  2D73TE70

## (undated) DEVICE — SU MONOCRYL 4-0 PS-2 18" UND Y496G

## (undated) DEVICE — LINEN TOWEL PACK X5 5464

## (undated) DEVICE — GLOVE PROTEXIS W/NEU-THERA 8.0  2D73TE80

## (undated) DEVICE — SU WND CLOSURE VLOC 180 ABS 3-0 6" V-20 VLOCL0604

## (undated) DEVICE — GOWN IMPERVIOUS BREATHABLE SMART LG 89015

## (undated) DEVICE — ENDO TROCAR FIRST ENTRY KII FIOS Z-THRD 05X100MM CTF03

## (undated) DEVICE — KIT PATIENT POSITIONING PIGAZZI LATEX FREE 40580

## (undated) DEVICE — EVAC SYSTEM CLEAR FLOW SC082500

## (undated) DEVICE — Device

## (undated) DEVICE — DAVINCI XI SEAL UNIVERSAL 5-8MM 470361

## (undated) DEVICE — DAVINCI XI FCP BIPOLAR FENESTRATED 470205

## (undated) DEVICE — ESU GROUND PAD UNIVERSAL W/O CORD

## (undated) DEVICE — SU PDS II 0 CT-2 27" Z334H

## (undated) DEVICE — DAVINCI XI NDL DRIVER MEGA SUTURE CUT 8MM 470309

## (undated) DEVICE — UTERINE MANIPULATOR RUMI TIP SACROCOLPOPEXY DST END SACRO-1C

## (undated) DEVICE — SU VICRYL 2-0 SH 27" J317H

## (undated) DEVICE — SOL NACL 0.9% INJ 1000ML BAG 2B1324X

## (undated) DEVICE — SOL NACL 0.9% IRRIG 1000ML BOTTLE 07138-09

## (undated) DEVICE — SU VICRYL 3-0 SH 27" J316H

## (undated) DEVICE — NDL INSUFFLATION 120MM VERRES 172015

## (undated) RX ORDER — HYDROMORPHONE HYDROCHLORIDE 1 MG/ML
INJECTION, SOLUTION INTRAMUSCULAR; INTRAVENOUS; SUBCUTANEOUS
Status: DISPENSED
Start: 2018-01-03

## (undated) RX ORDER — ONDANSETRON 2 MG/ML
INJECTION INTRAMUSCULAR; INTRAVENOUS
Status: DISPENSED
Start: 2018-01-03

## (undated) RX ORDER — EPINEPHRINE 1 MG/ML
INJECTION, SOLUTION INTRAMUSCULAR; SUBCUTANEOUS
Status: DISPENSED
Start: 2018-01-03

## (undated) RX ORDER — BUPIVACAINE HYDROCHLORIDE 5 MG/ML
INJECTION, SOLUTION EPIDURAL; INTRACAUDAL
Status: DISPENSED
Start: 2018-01-03

## (undated) RX ORDER — DEXAMETHASONE SODIUM PHOSPHATE 4 MG/ML
INJECTION, SOLUTION INTRA-ARTICULAR; INTRALESIONAL; INTRAMUSCULAR; INTRAVENOUS; SOFT TISSUE
Status: DISPENSED
Start: 2018-01-03

## (undated) RX ORDER — BUPIVACAINE HYDROCHLORIDE AND EPINEPHRINE 5; 5 MG/ML; UG/ML
INJECTION, SOLUTION EPIDURAL; INTRACAUDAL; PERINEURAL
Status: DISPENSED
Start: 2018-01-03

## (undated) RX ORDER — PROPOFOL 10 MG/ML
INJECTION, EMULSION INTRAVENOUS
Status: DISPENSED
Start: 2018-01-03

## (undated) RX ORDER — GLYCOPYRROLATE 0.2 MG/ML
INJECTION, SOLUTION INTRAMUSCULAR; INTRAVENOUS
Status: DISPENSED
Start: 2018-01-03

## (undated) RX ORDER — BUPIVACAINE HYDROCHLORIDE 2.5 MG/ML
INJECTION, SOLUTION EPIDURAL; INFILTRATION; INTRACAUDAL
Status: DISPENSED
Start: 2018-01-03

## (undated) RX ORDER — NEOSTIGMINE METHYLSULFATE 1 MG/ML
VIAL (ML) INJECTION
Status: DISPENSED
Start: 2018-01-03

## (undated) RX ORDER — KETOROLAC TROMETHAMINE 15 MG/ML
INJECTION, SOLUTION INTRAMUSCULAR; INTRAVENOUS
Status: DISPENSED
Start: 2018-01-03

## (undated) RX ORDER — LIDOCAINE HYDROCHLORIDE 20 MG/ML
INJECTION, SOLUTION EPIDURAL; INFILTRATION; INTRACAUDAL; PERINEURAL
Status: DISPENSED
Start: 2018-01-03

## (undated) RX ORDER — ACETAMINOPHEN 325 MG/1
TABLET ORAL
Status: DISPENSED
Start: 2018-01-03

## (undated) RX ORDER — FENTANYL CITRATE 50 UG/ML
INJECTION, SOLUTION INTRAMUSCULAR; INTRAVENOUS
Status: DISPENSED
Start: 2018-01-03